# Patient Record
Sex: FEMALE | Race: BLACK OR AFRICAN AMERICAN | Employment: OTHER | URBAN - METROPOLITAN AREA
[De-identification: names, ages, dates, MRNs, and addresses within clinical notes are randomized per-mention and may not be internally consistent; named-entity substitution may affect disease eponyms.]

---

## 2021-09-29 ENCOUNTER — APPOINTMENT (OUTPATIENT)
Dept: CT IMAGING | Age: 71
DRG: 069 | End: 2021-09-29
Payer: MEDICARE

## 2021-09-29 ENCOUNTER — HOSPITAL ENCOUNTER (INPATIENT)
Age: 71
LOS: 6 days | Discharge: HOME | DRG: 069 | End: 2021-10-05
Attending: EMERGENCY MEDICINE | Admitting: STUDENT IN AN ORGANIZED HEALTH CARE EDUCATION/TRAINING PROGRAM
Payer: MEDICARE

## 2021-09-29 ENCOUNTER — APPOINTMENT (OUTPATIENT)
Dept: GENERAL RADIOLOGY | Age: 71
DRG: 069 | End: 2021-09-29
Payer: MEDICARE

## 2021-09-29 DIAGNOSIS — E83.42 HYPOMAGNESEMIA: ICD-10-CM

## 2021-09-29 DIAGNOSIS — G45.9 TIA (TRANSIENT ISCHEMIC ATTACK): Primary | ICD-10-CM

## 2021-09-29 DIAGNOSIS — F11.93 HEROIN WITHDRAWAL (HCC): ICD-10-CM

## 2021-09-29 LAB
A/G RATIO: 1.2 (ref 1.1–2.2)
ALBUMIN SERPL-MCNC: 4.5 G/DL (ref 3.4–5)
ALBUMIN SERPL-MCNC: 4.6 G/DL (ref 3.4–5)
ALP BLD-CCNC: 29 U/L (ref 40–129)
ALP BLD-CCNC: 29 U/L (ref 40–129)
ALT SERPL-CCNC: 7 U/L (ref 10–40)
ALT SERPL-CCNC: 7 U/L (ref 10–40)
AMPHETAMINE SCREEN, URINE: NORMAL
ANION GAP SERPL CALCULATED.3IONS-SCNC: 17 MMOL/L (ref 3–16)
AST SERPL-CCNC: 19 U/L (ref 15–37)
AST SERPL-CCNC: 19 U/L (ref 15–37)
BARBITURATE SCREEN URINE: NORMAL
BASOPHILS ABSOLUTE: 0.1 K/UL (ref 0–0.2)
BASOPHILS RELATIVE PERCENT: 0.6 %
BENZODIAZEPINE SCREEN, URINE: NORMAL
BILIRUB SERPL-MCNC: 0.7 MG/DL (ref 0–1)
BILIRUB SERPL-MCNC: 0.7 MG/DL (ref 0–1)
BILIRUBIN DIRECT: <0.2 MG/DL (ref 0–0.3)
BILIRUBIN, INDIRECT: ABNORMAL MG/DL (ref 0–1)
BUN BLDV-MCNC: 12 MG/DL (ref 7–20)
CALCIUM SERPL-MCNC: 10.4 MG/DL (ref 8.3–10.6)
CANNABINOID SCREEN URINE: NORMAL
CHLORIDE BLD-SCNC: 96 MMOL/L (ref 99–110)
CO2: 23 MMOL/L (ref 21–32)
COCAINE METABOLITE SCREEN URINE: NORMAL
CREAT SERPL-MCNC: 0.8 MG/DL (ref 0.6–1.2)
EKG ATRIAL RATE: 69 BPM
EKG DIAGNOSIS: NORMAL
EKG P AXIS: 65 DEGREES
EKG P-R INTERVAL: 126 MS
EKG Q-T INTERVAL: 410 MS
EKG QRS DURATION: 76 MS
EKG QTC CALCULATION (BAZETT): 439 MS
EKG R AXIS: 70 DEGREES
EKG T AXIS: 23 DEGREES
EKG VENTRICULAR RATE: 69 BPM
EOSINOPHILS ABSOLUTE: 0 K/UL (ref 0–0.6)
EOSINOPHILS RELATIVE PERCENT: 0.2 %
ETHANOL: NORMAL MG/DL (ref 0–0.08)
GFR AFRICAN AMERICAN: >60
GFR NON-AFRICAN AMERICAN: >60
GLOBULIN: 4 G/DL
GLUCOSE BLD-MCNC: 107 MG/DL (ref 70–99)
GLUCOSE BLD-MCNC: 118 MG/DL
GLUCOSE BLD-MCNC: 118 MG/DL
GLUCOSE BLD-MCNC: 118 MG/DL (ref 70–99)
HCT VFR BLD CALC: 40.1 % (ref 36–48)
HEMOGLOBIN: 13.3 G/DL (ref 12–16)
INR BLD: 1.05 (ref 0.88–1.12)
LIPASE: 30 U/L (ref 13–60)
LYMPHOCYTES ABSOLUTE: 2.6 K/UL (ref 1–5.1)
LYMPHOCYTES RELATIVE PERCENT: 30.2 %
Lab: NORMAL
MAGNESIUM: 1.7 MG/DL (ref 1.8–2.4)
MCH RBC QN AUTO: 32 PG (ref 26–34)
MCHC RBC AUTO-ENTMCNC: 33.3 G/DL (ref 31–36)
MCV RBC AUTO: 95.9 FL (ref 80–100)
METHADONE SCREEN, URINE: NORMAL
MONOCYTES ABSOLUTE: 0.5 K/UL (ref 0–1.3)
MONOCYTES RELATIVE PERCENT: 5.9 %
NEUTROPHILS ABSOLUTE: 5.5 K/UL (ref 1.7–7.7)
NEUTROPHILS RELATIVE PERCENT: 63.1 %
OCCULT BLOOD SCREENING: NORMAL
OPIATE SCREEN URINE: NORMAL
OXYCODONE URINE: NORMAL
PDW BLD-RTO: 13.8 % (ref 12.4–15.4)
PERFORMED ON: ABNORMAL
PH UA: 6.5
PHENCYCLIDINE SCREEN URINE: NORMAL
PLATELET # BLD: 355 K/UL (ref 135–450)
PMV BLD AUTO: 7.5 FL (ref 5–10.5)
POTASSIUM REFLEX MAGNESIUM: 3.5 MMOL/L (ref 3.5–5.1)
PROPOXYPHENE SCREEN: NORMAL
PROTHROMBIN TIME: 12 SEC (ref 9.9–12.7)
RBC # BLD: 4.18 M/UL (ref 4–5.2)
SODIUM BLD-SCNC: 136 MMOL/L (ref 136–145)
TOTAL PROTEIN: 8.6 G/DL (ref 6.4–8.2)
TOTAL PROTEIN: 8.6 G/DL (ref 6.4–8.2)
TROPONIN: <0.01 NG/ML
WBC # BLD: 8.7 K/UL (ref 4–11)

## 2021-09-29 PROCEDURE — 6370000000 HC RX 637 (ALT 250 FOR IP): Performed by: STUDENT IN AN ORGANIZED HEALTH CARE EDUCATION/TRAINING PROGRAM

## 2021-09-29 PROCEDURE — 82077 ASSAY SPEC XCP UR&BREATH IA: CPT

## 2021-09-29 PROCEDURE — 85610 PROTHROMBIN TIME: CPT

## 2021-09-29 PROCEDURE — 85025 COMPLETE CBC W/AUTO DIFF WBC: CPT

## 2021-09-29 PROCEDURE — 6360000002 HC RX W HCPCS: Performed by: EMERGENCY MEDICINE

## 2021-09-29 PROCEDURE — 2500000003 HC RX 250 WO HCPCS: Performed by: STUDENT IN AN ORGANIZED HEALTH CARE EDUCATION/TRAINING PROGRAM

## 2021-09-29 PROCEDURE — 83690 ASSAY OF LIPASE: CPT

## 2021-09-29 PROCEDURE — 6370000000 HC RX 637 (ALT 250 FOR IP): Performed by: EMERGENCY MEDICINE

## 2021-09-29 PROCEDURE — 36415 COLL VENOUS BLD VENIPUNCTURE: CPT

## 2021-09-29 PROCEDURE — 80307 DRUG TEST PRSMV CHEM ANLYZR: CPT

## 2021-09-29 PROCEDURE — 2060000000 HC ICU INTERMEDIATE R&B

## 2021-09-29 PROCEDURE — 82270 OCCULT BLOOD FECES: CPT

## 2021-09-29 PROCEDURE — 70450 CT HEAD/BRAIN W/O DYE: CPT

## 2021-09-29 PROCEDURE — 71045 X-RAY EXAM CHEST 1 VIEW: CPT

## 2021-09-29 PROCEDURE — 2580000003 HC RX 258: Performed by: EMERGENCY MEDICINE

## 2021-09-29 PROCEDURE — 6360000002 HC RX W HCPCS: Performed by: STUDENT IN AN ORGANIZED HEALTH CARE EDUCATION/TRAINING PROGRAM

## 2021-09-29 PROCEDURE — 84484 ASSAY OF TROPONIN QUANT: CPT

## 2021-09-29 PROCEDURE — 2580000003 HC RX 258: Performed by: STUDENT IN AN ORGANIZED HEALTH CARE EDUCATION/TRAINING PROGRAM

## 2021-09-29 PROCEDURE — 93005 ELECTROCARDIOGRAM TRACING: CPT | Performed by: EMERGENCY MEDICINE

## 2021-09-29 PROCEDURE — 99284 EMERGENCY DEPT VISIT MOD MDM: CPT

## 2021-09-29 PROCEDURE — 93010 ELECTROCARDIOGRAM REPORT: CPT | Performed by: INTERNAL MEDICINE

## 2021-09-29 PROCEDURE — 80053 COMPREHEN METABOLIC PANEL: CPT

## 2021-09-29 PROCEDURE — 83735 ASSAY OF MAGNESIUM: CPT

## 2021-09-29 RX ORDER — LABETALOL HYDROCHLORIDE 5 MG/ML
10 INJECTION, SOLUTION INTRAVENOUS EVERY 10 MIN PRN
Status: DISCONTINUED | OUTPATIENT
Start: 2021-09-29 | End: 2021-09-30

## 2021-09-29 RX ORDER — ONDANSETRON 4 MG/1
4 TABLET, ORALLY DISINTEGRATING ORAL EVERY 8 HOURS PRN
COMMUNITY

## 2021-09-29 RX ORDER — ACETAMINOPHEN 325 MG/1
650 TABLET ORAL ONCE
Status: COMPLETED | OUTPATIENT
Start: 2021-09-29 | End: 2021-09-29

## 2021-09-29 RX ORDER — 0.9 % SODIUM CHLORIDE 0.9 %
500 INTRAVENOUS SOLUTION INTRAVENOUS ONCE
Status: COMPLETED | OUTPATIENT
Start: 2021-09-29 | End: 2021-09-29

## 2021-09-29 RX ORDER — ATORVASTATIN CALCIUM 40 MG/1
40 TABLET, FILM COATED ORAL NIGHTLY
Status: DISCONTINUED | OUTPATIENT
Start: 2021-09-29 | End: 2021-10-05 | Stop reason: HOSPADM

## 2021-09-29 RX ORDER — GABAPENTIN 300 MG/1
300 CAPSULE ORAL 3 TIMES DAILY
Status: ON HOLD | COMMUNITY
End: 2021-10-03 | Stop reason: HOSPADM

## 2021-09-29 RX ORDER — SODIUM CHLORIDE 9 MG/ML
25 INJECTION, SOLUTION INTRAVENOUS PRN
Status: DISCONTINUED | OUTPATIENT
Start: 2021-09-29 | End: 2021-10-05 | Stop reason: HOSPADM

## 2021-09-29 RX ORDER — SODIUM CHLORIDE 0.9 % (FLUSH) 0.9 %
5-40 SYRINGE (ML) INJECTION EVERY 12 HOURS SCHEDULED
Status: DISCONTINUED | OUTPATIENT
Start: 2021-09-29 | End: 2021-10-05 | Stop reason: HOSPADM

## 2021-09-29 RX ORDER — PANTOPRAZOLE SODIUM 40 MG/1
40 TABLET, DELAYED RELEASE ORAL DAILY
COMMUNITY

## 2021-09-29 RX ORDER — VENLAFAXINE HYDROCHLORIDE 75 MG/1
75 CAPSULE, EXTENDED RELEASE ORAL DAILY
COMMUNITY

## 2021-09-29 RX ORDER — BUPRENORPHINE 2 MG/1
4 TABLET SUBLINGUAL ONCE
Status: COMPLETED | OUTPATIENT
Start: 2021-09-29 | End: 2021-09-29

## 2021-09-29 RX ORDER — CLOPIDOGREL BISULFATE 75 MG/1
75 TABLET ORAL DAILY
Status: DISCONTINUED | OUTPATIENT
Start: 2021-09-30 | End: 2021-10-05 | Stop reason: HOSPADM

## 2021-09-29 RX ORDER — AMLODIPINE BESYLATE 5 MG/1
5 TABLET ORAL DAILY
Status: ON HOLD | COMMUNITY
End: 2021-10-05 | Stop reason: HOSPADM

## 2021-09-29 RX ORDER — ROSUVASTATIN CALCIUM 5 MG/1
5 TABLET, COATED ORAL DAILY
Status: ON HOLD | COMMUNITY
End: 2021-10-03 | Stop reason: HOSPADM

## 2021-09-29 RX ORDER — ASPIRIN 81 MG/1
81 TABLET, CHEWABLE ORAL DAILY
Status: ON HOLD | COMMUNITY
End: 2021-10-05 | Stop reason: HOSPADM

## 2021-09-29 RX ORDER — MAGNESIUM SULFATE IN WATER 40 MG/ML
2000 INJECTION, SOLUTION INTRAVENOUS ONCE
Status: COMPLETED | OUTPATIENT
Start: 2021-09-29 | End: 2021-09-29

## 2021-09-29 RX ORDER — ASPIRIN 81 MG/1
81 TABLET ORAL DAILY
Status: DISCONTINUED | OUTPATIENT
Start: 2021-09-30 | End: 2021-09-30

## 2021-09-29 RX ORDER — BUPRENORPHINE AND NALOXONE 8; 2 MG/1; MG/1
0.5 FILM, SOLUBLE BUCCAL; SUBLINGUAL DAILY
Status: DISCONTINUED | OUTPATIENT
Start: 2021-09-29 | End: 2021-09-29 | Stop reason: RX

## 2021-09-29 RX ORDER — LISINOPRIL 40 MG/1
40 TABLET ORAL DAILY
Status: ON HOLD | COMMUNITY
End: 2021-10-03 | Stop reason: HOSPADM

## 2021-09-29 RX ORDER — ALPRAZOLAM 0.25 MG/1
0.25 TABLET ORAL 2 TIMES DAILY
COMMUNITY

## 2021-09-29 RX ORDER — ONDANSETRON 2 MG/ML
4 INJECTION INTRAMUSCULAR; INTRAVENOUS ONCE
Status: COMPLETED | OUTPATIENT
Start: 2021-09-29 | End: 2021-09-29

## 2021-09-29 RX ORDER — ONDANSETRON 2 MG/ML
4 INJECTION INTRAMUSCULAR; INTRAVENOUS EVERY 6 HOURS PRN
Status: DISCONTINUED | OUTPATIENT
Start: 2021-09-29 | End: 2021-10-05 | Stop reason: HOSPADM

## 2021-09-29 RX ORDER — SODIUM CHLORIDE 0.9 % (FLUSH) 0.9 %
5-40 SYRINGE (ML) INJECTION PRN
Status: DISCONTINUED | OUTPATIENT
Start: 2021-09-29 | End: 2021-10-05 | Stop reason: HOSPADM

## 2021-09-29 RX ORDER — ONDANSETRON 4 MG/1
4 TABLET, ORALLY DISINTEGRATING ORAL EVERY 8 HOURS PRN
Status: DISCONTINUED | OUTPATIENT
Start: 2021-09-29 | End: 2021-10-05 | Stop reason: HOSPADM

## 2021-09-29 RX ORDER — POLYETHYLENE GLYCOL 3350 17 G/17G
17 POWDER, FOR SOLUTION ORAL DAILY PRN
Status: DISCONTINUED | OUTPATIENT
Start: 2021-09-29 | End: 2021-10-05 | Stop reason: HOSPADM

## 2021-09-29 RX ADMIN — ATORVASTATIN CALCIUM 40 MG: 40 TABLET, FILM COATED ORAL at 21:39

## 2021-09-29 RX ADMIN — SODIUM CHLORIDE 500 ML: 9 INJECTION, SOLUTION INTRAVENOUS at 15:29

## 2021-09-29 RX ADMIN — LABETALOL HYDROCHLORIDE 10 MG: 5 INJECTION INTRAVENOUS at 21:39

## 2021-09-29 RX ADMIN — BUPRENORPHINE 4 MG: 2 TABLET SUBLINGUAL at 14:36

## 2021-09-29 RX ADMIN — ONDANSETRON 4 MG: 2 INJECTION INTRAMUSCULAR; INTRAVENOUS at 21:42

## 2021-09-29 RX ADMIN — Medication 10 ML: at 21:39

## 2021-09-29 RX ADMIN — ONDANSETRON 4 MG: 2 INJECTION INTRAMUSCULAR; INTRAVENOUS at 15:45

## 2021-09-29 RX ADMIN — ACETAMINOPHEN 650 MG: 325 TABLET ORAL at 15:32

## 2021-09-29 RX ADMIN — MAGNESIUM SULFATE HEPTAHYDRATE 2000 MG: 40 INJECTION, SOLUTION INTRAVENOUS at 15:31

## 2021-09-29 ASSESSMENT — PAIN DESCRIPTION - LOCATION
LOCATION: ABDOMEN
LOCATION: ABDOMEN;HEAD

## 2021-09-29 ASSESSMENT — PAIN DESCRIPTION - ONSET
ONSET: PROGRESSIVE
ONSET: ON-GOING

## 2021-09-29 ASSESSMENT — PAIN DESCRIPTION - PAIN TYPE
TYPE: CHRONIC PAIN
TYPE: ACUTE PAIN

## 2021-09-29 ASSESSMENT — ENCOUNTER SYMPTOMS
BACK PAIN: 0
NAUSEA: 1
SORE THROAT: 0
SHORTNESS OF BREATH: 0
DIARRHEA: 1
ABDOMINAL PAIN: 0
RHINORRHEA: 0
VOMITING: 1
EYE REDNESS: 0

## 2021-09-29 ASSESSMENT — PAIN SCALES - GENERAL
PAINLEVEL_OUTOF10: 10
PAINLEVEL_OUTOF10: 6

## 2021-09-29 ASSESSMENT — PAIN DESCRIPTION - DESCRIPTORS
DESCRIPTORS: SHARP
DESCRIPTORS: ACHING

## 2021-09-29 ASSESSMENT — PAIN DESCRIPTION - PROGRESSION
CLINICAL_PROGRESSION: GRADUALLY WORSENING

## 2021-09-29 ASSESSMENT — PAIN DESCRIPTION - FREQUENCY: FREQUENCY: CONTINUOUS

## 2021-09-29 ASSESSMENT — PAIN DESCRIPTION - ORIENTATION: ORIENTATION: MID

## 2021-09-29 NOTE — ED NOTES
Pt voided on bedpan  Up dated on rm assignment.    Headache remains [address] [facility], [facility]  09/29/21 1737

## 2021-09-29 NOTE — ED NOTES
Report to [facility] @ [address] [place] [address], [address] [place] [address]  09/29/21 2041

## 2021-09-29 NOTE — ED NOTES
EMD at bedside talking with family about test results    Pt shana out with leg cramp to rt lower leg at intervals   Unable to keep any medication down past 2 days     Blade Baer RN  09/29/21 1777

## 2021-09-29 NOTE — ED NOTES
Pt voided in depends  Cleaned up  Pt able to roll easily from side to side     Joselito Ochoa RN  09/29/21 7818

## 2021-09-29 NOTE — ED PROVIDER NOTES
[address] [facility]  eMERGENCY dEPARTMENT eNCOUnter      Pt Name: Dietrich Lefort  MRN: [id number]  Gloriagflaith 1950  Date of evaluation: 9/29/2021  Provider: Cedric Lyle MD    CHIEF COMPLAINT       Chief Complaint   Patient presents with    Other     pt was brought to ER by sister  was taken to [facility] this am 10:30 am for abd pain  abd headache  left ER due to long wait  states was also at  2 days for HIGH B/P         HISTORY OF PRESENT ILLNESS   (Location/Symptom, Timing/Onset,Context/Setting, Quality, Duration, Modifying Factors, Severity)  Note limiting factors. Dietrich Lefort is a 69 y.o. female who presents to the emergency department with concern for left facial droop and heroin withdrawal.  This patient has a past medical history of heroin abuse. She last used on Friday. This weekend she was seen at an outside hospital and started on clonidine and some other medications for heroin withdrawal.  History was obtained mostly from the sister of the patient. The patient's sister states that the patient has been abusing heroin and she lives in [place]. She recently brought her down to [place] in an effort to be of help to get her off heroin. She last used on Friday. Saturday started having symptoms of withdrawal she was seen in outside hospital and started on antiemetic and clonidine. This morning she last was normal at 10:30 AM.  When she came to the ED today she noticed that she had a little left facial droop. She was put into the room and will be checked by pulling out her mask we did not see a facial droop. Patient does complain of a headache since last evening. It was gradual in onset and worsened throughout the night and this morning. Patient has been having associated nausea vomiting and diarrhea from the heroin withdrawal.  She also complains of chronic right upper quadrant pain for the past year. HPI    NursingNotes were reviewed.     REVIEW OF SYSTEMS    (2-9 systems for level 4, 10 or more for level 5)     Review of Systems   Constitutional: Negative for chills and fever. HENT: Negative for rhinorrhea and sore throat. Eyes: Negative for redness. Respiratory: Negative for shortness of breath. Cardiovascular: Negative for chest pain. Gastrointestinal: Positive for diarrhea, nausea and vomiting. Negative for abdominal pain. Genitourinary: Negative for flank pain. Musculoskeletal: Negative for back pain. Skin: Negative for rash. Neurological: Positive for facial asymmetry and headaches. Hematological: Negative for adenopathy. Psychiatric/Behavioral: Negative for confusion. Except as noted above the remainder of the review of systems was reviewed and negative. PAST MEDICAL HISTORY     Past Medical History:   Diagnosis Date    Asthma     [facility]bral artery occlusion [facility] ([facility]     COPD (chronic obstructive pulmonary disease) ([facility]     Hypertension     Seiz[facility]          SURGICALHISTORY     History reviewed. No pertinent surgical history. CURRENT MEDICATIONS       Previous Medications    GABAPENTIN (NEURONTIN) 300 MG CAPSULE    Take 300 mg by mouth 3 times daily. PANTOPRAZOLE (PROTONIX) 40 MG TABLET    Take 40 mg by mouth daily       ALLERGIES     Iodine, Nsaids, and Sulfa antibiotics    FAMILY HISTORY     History reviewed. No pertinent family history.        SOCIAL HISTORY       Social History     Socioeconomic History    Marital status: Single     Spouse name: None    Number of children: None    Years of education: None    Highest education level: None   Occupational History    None   Tobacco Use    Smoking status: Current Every Day Smoker     Packs/day: 0.50    Smokeless tobacco: Current User   Substance and Sexual Activity    Alcohol use: Not Currently    Drug use: Yes     Types: Marijuana     Comment: heroin  snorts daily last used 5 days ago    Sexual activity: Not Currently Other Topics Concern    None   Social History Narrative    None     Social Determinants of Health     Financial Resource Strain:     Difficulty of Paying Living Expenses:    Food Insecurity:     Worried About Running Out of Food in the Last Year:     [address] in the Last Year:    Transportation Needs:     Lack of Transportation (Medical):  Lack of Transportation (Non-Medical):    Physical Activity:     Days of Exercise per Week:     Minutes of Exercise per Session:    Stress:     Feeling of Stress :    Social Connections:     Frequency of Communication with Friends and Family:     Frequency of Social Gatherings with Friends and Family:     Attends Spiritism Services:     Active Member of Clubs or Organizations:     Attends Club or Organization Meetings:     Marital Status:    Intimate Partner Violence:     Fear of Current or Ex-Partner:     Emotionally Abused:     Physically Abused:     Sexually Abused:        SCREENINGS   NIH Stroke Scale  Interval: Baseline  Level of Consciousness (1a. ): Alert  LOC Questions (1b. ): Answers both correctly  LOC Commands (1c. ): Performs both tasks correctly  Best Gaze (2. ): Normal  Visual (3. ): No visual loss  Facial Palsy (4. ): Normal symmetrical movement  Motor Arm, Left (5a. ): No drift  Motor Arm, Right (5b. ): No drift  Motor Leg, Left (6a. ):  (pt wont lift legs due to leg pain tanya  did walk from  car to w/c)  Limb Ataxia (7. ): Absent  Sensory (8. ): Normal  Best Language (9. ): No aphasia  Dysarthria (10. ): Normal         PHYSICAL EXAM    (up to 7 for level 4, 8 or more for level 5)     ED Triage Vitals   BP Temp Temp src Pulse Resp SpO2 Height Weight   -- -- -- -- -- -- -- --       Physical Exam  Vitals and nursing note reviewed. Constitutional:       Appearance: She is well-developed. She is not diaphoretic. HENT:      Head: Normocephalic and atraumatic.       Comments: No facial droop  Eyes:      General:         Right eye: No discharge. Left eye: No discharge. Conjunctiva/sclera: Conjunctivae normal.   Cardiovascular:      Rate and Rhythm: Normal rate. Pulses: Normal pulses. Pulmonary:      Effort: Pulmonary effort is normal. No respiratory distress. Breath sounds: Normal breath sounds. No wheezing, rhonchi or rales. Abdominal:      Palpations: Abdomen is soft. Tenderness: There is no abdominal tenderness. There is no guarding or rebound. Musculoskeletal:         General: Normal range of motion. Skin:     General: Skin is warm and dry. Capillary Refill: Capillary refill takes less than 2 seconds. Neurological:      Mental Status: She is alert and oriented to person, place, and time. Comments: NIHSS 0   Psychiatric:         Behavior: Behavior normal.         DIAGNOSTIC RESULTS     EKG: All EKG's are interpreted by the Emergency Department Physician who either signs or Co-signsthis chart in the absence of a cardiologist.  The Ekg interpreted by me shows  normal sinus rhythm with a rate of 69  Axis is   Normal  QTc is  normal  Intervals and Durations are unremarkable. ST Segments: nonspecific changes  No prior EKG available for comparison at this time            RADIOLOGY:   Non-plain filmimages such as CT, Ultrasound and MRI are read by the radiologist. Plain radiographic images are visualized and preliminarily interpreted by the emergency physician with the below findings:        Interpretation per the Radiologist below, if available at the time ofthis note:    XR CHEST 1 VIEW   Final Result   No acute disease         CT HEAD WO CONTRAST   Final Result   No acute intracranial abnormality. Chronic small-vessel white matter ischemic changes. Left basal ganglia   lacunar infarct. Findings were discussed with Porsha Gibson at 12:21 pm on   9/29/2021.                ED BEDSIDE ULTRASOUND:   Performed by ED Physician - none    LABS:  Labs Reviewed   COMPREHENSIVE METABOLIC PANEL W/ REFLEX TO MG FOR LOW K - Abnormal; Notable for the following components:       Result Value    Chloride 96 (*)     Anion Gap 17 (*)     Glucose 107 (*)     Total Protein 8.6 (*)     Alkaline Phosphatase 29 (*)     ALT 7 (*)     All other components within normal limits    Narrative:     Performed at:  [facility]  [address] [place], [place]   Phone [phone]   HEPATIC FUNCTION PANEL - Abnormal; Notable for the following components:     Total Protein 8.6 (*)     Alkaline Phosphatase 29 (*)     ALT 7 (*)     All other components within normal limits    Narrative:     Performed at:  [facility]  [address] [place], [place]   Phone [phone]   MAGNESIUM - Abnormal; Notable for the following components:    Magnesium 1.70 (*)     All other components within normal limits    Narrative:     Performed at:  [facility]  [address] [place], [place]   Phone [phone]   POCT GLUCOSE - Abnormal; Notable for the following components:    POC Glucose 118 (*)     All other components within normal limits    Narrative:     Performed at:  [facility]  [address] [place], [place]   Phone [phone]   POCT GLUCOSE - Normal   POCT GLUCOSE - Normal   C DIFF TOXIN/ANTIGEN   CBC WITH AUTO DIFFERENTIAL    Narrative:     Performed at:  [facility]  [address] [place], [place]   Phone [phone]   TROPONIN    Narrative:     Performed at:  [address] [facility]  [address] [place], [place]   Phone [phone]   PROTIME-INR    Narrative:     Performed at:  [address] [facility]  [address] [place], [place]   Phone [phone] ETHANOL    Narrative:     Performed at:  [facility]  [address] [place], [facility]   Phone [phone]   LIPASE    Narrative:     Performed at:  [address] [facility]  [address] [place], [facility]   Phone [phone]   URINE DRUG SCREEN   BLOOD OCCULT STOOL SCREEN #1       All other labs were within normal range or not returned as of this dictation. EMERGENCY DEPARTMENT COURSE and DIFFERENTIAL DIAGNOSIS/MDM:   Vitals:    Vitals:    09/29/21 1207 09/29/21 1259 09/29/21 1302   BP: (!) 256/85 (!) 228/78 (S) (!) 243/78   Pulse: 66     Resp: 18     Temp: 98 °F (36.7 °C)     TempSrc: Oral     SpO2: 100%     Height: 5' 2\" (1.575 m)             MDM  Number of Diagnoses or Management Options  Heroin withdrawal (HCC)  TIA (transient ischemic attack)  Diagnosis management comments: DDX: TIA, CVA, Mass, other    Verbal history the patient has had a prior TIA but they also report that she has some residual weakness suggestive of a prior CVA. Patient has an allergy to contrast which she states is life-threatening. Currently she has no facial droop and her NIH SS delta is essentially 0. She has no new neurologic symptoms currently but she did have facial droop just prior to arrival.  As such a CT of the head was undertaken and did not reveal any acute pathology. Given her life-threatening allergy to contrast a CT angiogram was not undertaken. I spoke to the stroke service and they agree with that plan. They do not feel any acute intervention is indicated at this time. They do recommend consider dual antiplatelet therapy if the patient did have a prior CVA. I feel the patient's low magnesium is most likely reflective of her diarrhea and vomiting by history. Her cow score is greater than 8. She will be started on Subutex in the ED.   I believe the symptoms are secondary to her heroin withdrawal.  I have spoken to the hospitalist Dr. Ryan Hardin who accepts to [facility] at 2:20 PM.      CRITICAL CARE TIME   Total Critical Care time was 30 minutes, excluding separately reportable procedures. There was a high probability of clinically significant/life threatening deterioration in the patient's condition which required my urgent intervention. CONSULTS:  IP CONSULT TO STROKE TEAM  IP CONSULT TO PHARMACY  PHARMACY TO CHANGE BASE FLUIDS    PROCEDURES:  Unless otherwise noted below, none     Procedures    FINAL IMPRESSION      1. TIA (transient ischemic attack)    2. Heroin withdrawal ([facility]    3. Hypomagnesemia          DISPOSITION/PLAN   DISPOSITION Admitted 09/29/2021 02:31:30 PM      PATIENT REFERRED TO:  No follow-up provider specified.     DISCHARGE MEDICATIONS:  New Prescriptions    No medications on file          (Please note that portions of this note were completed with a voice recognition program.Efforts were made to edit the dictations but occasionally words are mis-transcribed.)    Lucille Dolan MD (electronically signed)  Attending Emergency Physician          Lucille Dolan MD  09/29/21 0[address] [facility] [address], [place]  09/29/21 1015

## 2021-09-29 NOTE — ED NOTES
Pt states has had abd pain for awhile  More severe past few days vomiting yesterday and today loose stools yellowish in color  States some days stool has been black     Chanell Mallory, WINNIE  09/29/21 8580

## 2021-09-29 NOTE — ED NOTES
Fall risk screening completed. Fall risk bracelet applied to patient. Non-skid socks provided and placed on patient. The fall risk is indicated using  fall sign . Based on score, a bed alarm was not indicated. The call light is within the patient's reach, and instructions for use were provided. The bed is in the lowest position with wheels locked. The patient has been advised to notify staff, using the call light, if there is a need to get up or use restroom. The patient verbalized understanding of safety precautions and how to contact staff for assistance.       Aleena Story RN  09/29/21 1977

## 2021-09-29 NOTE — ED NOTES
Family states while in car getting in into ER had rt sided mouth droop   Resolved once on bed in ER  States has hx of stroke with lf sided weakness     Moiz Armstrong, RN  09/29/21 1400

## 2021-09-30 ENCOUNTER — APPOINTMENT (OUTPATIENT)
Dept: MRI IMAGING | Age: 71
DRG: 069 | End: 2021-09-30
Payer: MEDICARE

## 2021-09-30 PROBLEM — E43 SEVERE MALNUTRITION (HCC): Chronic | Status: ACTIVE | Noted: 2021-09-30

## 2021-09-30 LAB
ANION GAP SERPL CALCULATED.3IONS-SCNC: 14 MMOL/L (ref 3–16)
BASOPHILS ABSOLUTE: 0.1 K/UL (ref 0–0.2)
BASOPHILS RELATIVE PERCENT: 0.7 %
BUN BLDV-MCNC: 11 MG/DL (ref 7–20)
CALCIUM SERPL-MCNC: 9.4 MG/DL (ref 8.3–10.6)
CHLORIDE BLD-SCNC: 96 MMOL/L (ref 99–110)
CHOLESTEROL, TOTAL: 185 MG/DL (ref 0–199)
CO2: 23 MMOL/L (ref 21–32)
CREAT SERPL-MCNC: 0.8 MG/DL (ref 0.6–1.2)
EOSINOPHILS ABSOLUTE: 0.1 K/UL (ref 0–0.6)
EOSINOPHILS RELATIVE PERCENT: 0.9 %
ESTIMATED AVERAGE GLUCOSE: 108.3 MG/DL
GFR AFRICAN AMERICAN: >60
GFR NON-AFRICAN AMERICAN: >60
GLUCOSE BLD-MCNC: 115 MG/DL (ref 70–99)
HBA1C MFR BLD: 5.4 %
HCT VFR BLD CALC: 36.9 % (ref 36–48)
HDLC SERPL-MCNC: 55 MG/DL (ref 40–60)
HEMOGLOBIN: 12.5 G/DL (ref 12–16)
LDL CHOLESTEROL CALCULATED: 111 MG/DL
LV EF: 70 %
LVEF MODALITY: NORMAL
LYMPHOCYTES ABSOLUTE: 2.4 K/UL (ref 1–5.1)
LYMPHOCYTES RELATIVE PERCENT: 33 %
MCH RBC QN AUTO: 32.1 PG (ref 26–34)
MCHC RBC AUTO-ENTMCNC: 33.8 G/DL (ref 31–36)
MCV RBC AUTO: 94.7 FL (ref 80–100)
MONOCYTES ABSOLUTE: 0.5 K/UL (ref 0–1.3)
MONOCYTES RELATIVE PERCENT: 6.1 %
NEUTROPHILS ABSOLUTE: 4.4 K/UL (ref 1.7–7.7)
NEUTROPHILS RELATIVE PERCENT: 59.3 %
PDW BLD-RTO: 13.6 % (ref 12.4–15.4)
PLATELET # BLD: 318 K/UL (ref 135–450)
PMV BLD AUTO: 7.7 FL (ref 5–10.5)
POTASSIUM SERPL-SCNC: 3.5 MMOL/L (ref 3.5–5.1)
RBC # BLD: 3.9 M/UL (ref 4–5.2)
SODIUM BLD-SCNC: 133 MMOL/L (ref 136–145)
TRIGL SERPL-MCNC: 94 MG/DL (ref 0–150)
VLDLC SERPL CALC-MCNC: 19 MG/DL
WBC # BLD: 7.4 K/UL (ref 4–11)

## 2021-09-30 PROCEDURE — 93306 TTE W/DOPPLER COMPLETE: CPT

## 2021-09-30 PROCEDURE — 6370000000 HC RX 637 (ALT 250 FOR IP): Performed by: INTERNAL MEDICINE

## 2021-09-30 PROCEDURE — 2500000003 HC RX 250 WO HCPCS: Performed by: INTERNAL MEDICINE

## 2021-09-30 PROCEDURE — 6370000000 HC RX 637 (ALT 250 FOR IP): Performed by: STUDENT IN AN ORGANIZED HEALTH CARE EDUCATION/TRAINING PROGRAM

## 2021-09-30 PROCEDURE — 6360000002 HC RX W HCPCS: Performed by: INTERNAL MEDICINE

## 2021-09-30 PROCEDURE — 97165 OT EVAL LOW COMPLEX 30 MIN: CPT

## 2021-09-30 PROCEDURE — 97162 PT EVAL MOD COMPLEX 30 MIN: CPT | Performed by: PHYSICAL THERAPIST

## 2021-09-30 PROCEDURE — 92610 EVALUATE SWALLOWING FUNCTION: CPT

## 2021-09-30 PROCEDURE — 70544 MR ANGIOGRAPHY HEAD W/O DYE: CPT

## 2021-09-30 PROCEDURE — 2580000003 HC RX 258: Performed by: STUDENT IN AN ORGANIZED HEALTH CARE EDUCATION/TRAINING PROGRAM

## 2021-09-30 PROCEDURE — 6370000000 HC RX 637 (ALT 250 FOR IP): Performed by: NURSE PRACTITIONER

## 2021-09-30 PROCEDURE — 2500000003 HC RX 250 WO HCPCS: Performed by: STUDENT IN AN ORGANIZED HEALTH CARE EDUCATION/TRAINING PROGRAM

## 2021-09-30 PROCEDURE — 80061 LIPID PANEL: CPT

## 2021-09-30 PROCEDURE — 94760 N-INVAS EAR/PLS OXIMETRY 1: CPT

## 2021-09-30 PROCEDURE — 97116 GAIT TRAINING THERAPY: CPT | Performed by: PHYSICAL THERAPIST

## 2021-09-30 PROCEDURE — 36415 COLL VENOUS BLD VENIPUNCTURE: CPT

## 2021-09-30 PROCEDURE — 6360000002 HC RX W HCPCS: Performed by: STUDENT IN AN ORGANIZED HEALTH CARE EDUCATION/TRAINING PROGRAM

## 2021-09-30 PROCEDURE — 85025 COMPLETE CBC W/AUTO DIFF WBC: CPT

## 2021-09-30 PROCEDURE — 80048 BASIC METABOLIC PNL TOTAL CA: CPT

## 2021-09-30 PROCEDURE — 2060000000 HC ICU INTERMEDIATE R&B

## 2021-09-30 PROCEDURE — 83036 HEMOGLOBIN GLYCOSYLATED A1C: CPT

## 2021-09-30 PROCEDURE — 97530 THERAPEUTIC ACTIVITIES: CPT

## 2021-09-30 RX ORDER — ALBUTEROL SULFATE 90 UG/1
2 AEROSOL, METERED RESPIRATORY (INHALATION) EVERY 6 HOURS PRN
COMMUNITY

## 2021-09-30 RX ORDER — PANTOPRAZOLE SODIUM 40 MG/1
40 TABLET, DELAYED RELEASE ORAL
Status: DISCONTINUED | OUTPATIENT
Start: 2021-09-30 | End: 2021-10-05 | Stop reason: HOSPADM

## 2021-09-30 RX ORDER — AMLODIPINE BESYLATE 5 MG/1
5 TABLET ORAL NIGHTLY
Status: DISCONTINUED | OUTPATIENT
Start: 2021-09-30 | End: 2021-10-05

## 2021-09-30 RX ORDER — VENLAFAXINE HYDROCHLORIDE 75 MG/1
75 CAPSULE, EXTENDED RELEASE ORAL DAILY
Status: DISCONTINUED | OUTPATIENT
Start: 2021-09-30 | End: 2021-10-05 | Stop reason: HOSPADM

## 2021-09-30 RX ORDER — AMLODIPINE BESYLATE 10 MG/1
10 TABLET ORAL DAILY
Status: DISCONTINUED | OUTPATIENT
Start: 2021-09-30 | End: 2021-09-30

## 2021-09-30 RX ORDER — LISINOPRIL 40 MG/1
40 TABLET ORAL DAILY
Status: DISCONTINUED | OUTPATIENT
Start: 2021-09-30 | End: 2021-10-05 | Stop reason: HOSPADM

## 2021-09-30 RX ORDER — GABAPENTIN 300 MG/1
300 CAPSULE ORAL 3 TIMES DAILY
Status: DISCONTINUED | OUTPATIENT
Start: 2021-09-30 | End: 2021-10-05 | Stop reason: HOSPADM

## 2021-09-30 RX ORDER — NICOTINE 21 MG/24HR
1 PATCH, TRANSDERMAL 24 HOURS TRANSDERMAL DAILY
Status: DISCONTINUED | OUTPATIENT
Start: 2021-09-30 | End: 2021-10-05 | Stop reason: HOSPADM

## 2021-09-30 RX ORDER — AMLODIPINE BESYLATE 5 MG/1
5 TABLET ORAL DAILY
Status: DISCONTINUED | OUTPATIENT
Start: 2021-09-30 | End: 2021-09-30

## 2021-09-30 RX ORDER — ALPRAZOLAM 0.25 MG/1
0.25 TABLET ORAL 2 TIMES DAILY
Status: DISCONTINUED | OUTPATIENT
Start: 2021-09-30 | End: 2021-10-05 | Stop reason: HOSPADM

## 2021-09-30 RX ORDER — HYDRALAZINE HYDROCHLORIDE 20 MG/ML
10 INJECTION INTRAMUSCULAR; INTRAVENOUS EVERY 6 HOURS PRN
Status: DISCONTINUED | OUTPATIENT
Start: 2021-09-30 | End: 2021-10-05 | Stop reason: HOSPADM

## 2021-09-30 RX ORDER — LABETALOL HYDROCHLORIDE 5 MG/ML
10 INJECTION, SOLUTION INTRAVENOUS EVERY 4 HOURS PRN
Status: DISCONTINUED | OUTPATIENT
Start: 2021-09-30 | End: 2021-10-01

## 2021-09-30 RX ADMIN — GABAPENTIN 300 MG: 300 CAPSULE ORAL at 20:50

## 2021-09-30 RX ADMIN — HYDRALAZINE HYDROCHLORIDE 10 MG: 20 INJECTION INTRAMUSCULAR; INTRAVENOUS at 14:38

## 2021-09-30 RX ADMIN — Medication 10 ML: at 14:38

## 2021-09-30 RX ADMIN — AMLODIPINE BESYLATE 5 MG: 5 TABLET ORAL at 20:50

## 2021-09-30 RX ADMIN — LABETALOL HYDROCHLORIDE 10 MG: 5 INJECTION INTRAVENOUS at 11:38

## 2021-09-30 RX ADMIN — LISINOPRIL 40 MG: 40 TABLET ORAL at 11:55

## 2021-09-30 RX ADMIN — ALPRAZOLAM 0.25 MG: 0.25 TABLET ORAL at 11:55

## 2021-09-30 RX ADMIN — LABETALOL HYDROCHLORIDE 10 MG: 5 INJECTION INTRAVENOUS at 00:23

## 2021-09-30 RX ADMIN — ATORVASTATIN CALCIUM 40 MG: 40 TABLET, FILM COATED ORAL at 20:50

## 2021-09-30 RX ADMIN — ONDANSETRON 4 MG: 2 INJECTION INTRAMUSCULAR; INTRAVENOUS at 09:23

## 2021-09-30 RX ADMIN — LABETALOL HYDROCHLORIDE 10 MG: 5 INJECTION INTRAVENOUS at 17:57

## 2021-09-30 RX ADMIN — LABETALOL HYDROCHLORIDE 10 MG: 5 INJECTION INTRAVENOUS at 03:50

## 2021-09-30 RX ADMIN — ALPRAZOLAM 0.25 MG: 0.25 TABLET ORAL at 20:13

## 2021-09-30 RX ADMIN — PANTOPRAZOLE SODIUM 40 MG: 40 TABLET, DELAYED RELEASE ORAL at 11:55

## 2021-09-30 RX ADMIN — Medication 10 ML: at 09:26

## 2021-09-30 ASSESSMENT — PAIN DESCRIPTION - PROGRESSION
CLINICAL_PROGRESSION: GRADUALLY WORSENING

## 2021-09-30 ASSESSMENT — PAIN SCALES - GENERAL
PAINLEVEL_OUTOF10: 9
PAINLEVEL_OUTOF10: 0

## 2021-09-30 ASSESSMENT — PAIN DESCRIPTION - ORIENTATION: ORIENTATION: MID

## 2021-09-30 ASSESSMENT — PAIN DESCRIPTION - PAIN TYPE: TYPE: CHRONIC PAIN

## 2021-09-30 ASSESSMENT — PAIN DESCRIPTION - FREQUENCY: FREQUENCY: CONTINUOUS

## 2021-09-30 ASSESSMENT — PAIN DESCRIPTION - LOCATION: LOCATION: ABDOMEN;HEAD

## 2021-09-30 ASSESSMENT — PAIN DESCRIPTION - DESCRIPTORS: DESCRIPTORS: ACHING

## 2021-09-30 ASSESSMENT — PAIN DESCRIPTION - ONSET: ONSET: ON-GOING

## 2021-09-30 NOTE — H&P
daily. Yes Historical Provider, MD   lisinopril (PRINIVIL;ZESTRIL) 40 MG tablet Take 40 mg by mouth daily   Yes Historical Provider, MD   amLODIPine (NORVASC) 10 MG tablet Take 10 mg by mouth daily   Yes Historical Provider, MD   ALPRAZolam (XANAX) 0.5 MG tablet Take 0.5 mg by mouth 2 times daily. Yes Historical Provider, MD   venlafaxine (EFFEXOR XR) 75 MG extended release capsule Take 75 mg by mouth daily   Yes Historical Provider, MD   ondansetron (ZOFRAN-ODT) 4 MG disintegrating tablet Take 4 mg by mouth every 8 hours as needed for Nausea or Vomiting   Yes Historical Provider, MD   Indapamide (LOZOL PO) Take 2.5 mg by mouth   Yes Historical Provider, MD   rosuvastatin (CRESTOR) 10 MG tablet Take 5 mg by mouth daily   Yes Historical Provider, MD   linaclotide (LINZESS) 145 MCG capsule Take 145 mcg by mouth every morning (before breakfast)   Yes Historical Provider, MD   aspirin 81 MG chewable tablet Take 81 mg by mouth daily    Historical Provider, MD       Allergies:  Iodine, Nsaids, and Sulfa antibiotics    Social History:      The patient currently lives with sister    TOBACCO:   reports that she has been smoking. She has been smoking about 0.50 packs per day. She uses smokeless tobacco.  ETOH:   reports previous alcohol use. Family History:      Reviewed in detail positive as follows:    History reviewed. No pertinent family history. REVIEW OF SYSTEMS:   Pertinent positives as noted in the HPI. All other systems reviewed and negative. PHYSICAL EXAM PERFORMED:    BP (!) 232/77   Pulse 64   Temp 98.8 °F (37.1 °C) (Oral)   Resp 18   Ht 5' 1\" (1.549 m)   Wt 108 lb 3.9 oz (49.1 kg)   SpO2 99%   BMI 20.45 kg/m²     General appearance:  Well developed, well nourished, elderly female lying in hospital bed in no apparent distress, appears stated age and cooperative. HEENT:  Normal cephalic, atraumatic without obvious deformity. Pupils equal, round, and reactive to light.   Conjunctivae/corneas clear.  Neck: Supple, with full range of motion. No jugular venous distention. Trachea midline. Respiratory:  Normal respiratory effort. Clear to auscultation, bilaterally without accessory muscle use. Cardiovascular:  Regular rate and rhythm without murmurs, no lower extremity edema. Abdomen: Soft, non-tender, non-distended, without rebound or guarding. Normal bowel sounds. Musculoskeletal:  Moves all extremities equally. Full range of motion without deformity. Skin: Skin warm, dry and intact. No rashes or lesions. Neurologic:  Neurovascularly intact without any focal sensory/motor deficits. Cranial nerves: II-XII intact, grossly non-focal.  Psychiatric:  Alert and oriented, thought content appropriate, normal insight  Capillary Refill: Brisk,< 3 seconds   Peripheral Pulses: +2 palpable, equal bilaterally       Labs:     Recent Labs     09/29/21  1208   WBC 8.7   HGB 13.3   HCT 40.1        Recent Labs     09/29/21  1208      K 3.5   CL 96*   CO2 23   BUN 12   CREATININE 0.8   CALCIUM 10.4     Recent Labs     09/29/21  1150 09/29/21  1208   AST 19 19   ALT 7* 7*   BILIDIR <0.2  --    BILITOT 0.7 0.7   ALKPHOS 29* 29*     Recent Labs     09/29/21  1208   INR 1.05     Recent Labs     09/29/21  1208   TROPONINI <0.01       Urinalysis:    No results found for: [place], [address], BACTERIA, RBCUA, BLOODU, [address], [address]    Radiology:     XR CHEST 1 VIEW   Final Result   No acute disease         CT HEAD WO CONTRAST   Final Result   No acute intracranial abnormality. Chronic small-vessel white matter ischemic changes. Left basal ganglia   lacunar infarct. Findings were discussed with Matilda Villareal at 12:21 pm on   9/29/2021.              ASSESSMENT:    Active Hospital Problems    Diagnosis Date Noted    TIA (transient ischemic attack) [G45.9] 09/29/2021         PLAN:    Possible TIA/CVA  - with symptoms: facial droop resolved  - admit to OBS to RO TIA/CVA  - out of the tPA window  -

## 2021-09-30 NOTE — PROGRESS NOTES
Patient requesting to go AMA-  Patient requested we call her sister- call placed- patient sister stated she is on her way to the hospital. Sister states MD had not called    Request made to MD to call sister   Name   Mona Bustamante   Relation: Brother/Sister   Mobile: [phone]

## 2021-09-30 NOTE — CONSULTS
Neurology Consult Note  Reason for Consult: TIA/CVA    Chief complaint: not eating well    Dr Ajit Moore MD asked me to see Stefan Haney in consultation for evaluation of TIA/CVA    History of Present Illness:  Stefan Haney is a 69 y.o. female who presents with feeling unwell. I obtained my information via interview w/ the patient, supplemented by chart review. The patient has a hx of snorting heroin. She tells me she last used two Saturdays ago, though per chart this may have been as recent as the 24th per  ED notes. She says she has not been eating very well for the past few days. No appetite. She tells me she was at [employer] the other day and was just feeling \"bummed\". Her sister, who is a recovering addict and is trying to get her on the right path, thought that she wasn't doing very well and thought she should come to the ED to be evaluated. While here, apparently the patient's sister though she had a bit of facial weakness though the patient cannot tell me which side. ED evaluation did not identify any facial weakness or any other focal neurologic deficits. She has been significantly hypertensive, well over  frequently. CT head was w/out any acute findings. She has a contrast allergy to CTA imaging was not pursued. Currently, she feels lousy in general though no specific complaints. She does say she has a hx of stroke in 2008 w/ some residual RLE weakness. She says that she has not been taking asa at home and has an allergy to it (can't elaborate). Also, she says she has not had her amlodipine at night since she was previously released from a hospitalization last Wednesday for stomach issues (?). Medical History:  Past Medical History:   Diagnosis Date    Asthma     [facility]erebral [facility] occlusion with [facility] infarction ([facility]     COPD (chronic obstructive pulmonary disease) (HCC)     Hypertension     Seizures ([facility]      History reviewed.  No pertinent surgical history. Scheduled Meds:   ALPRAZolam  0.25 mg Oral BID    amLODIPine  10 mg Oral Daily    gabapentin  300 mg Oral TID    lisinopril  40 mg Oral Daily    pantoprazole  40 mg Oral QAM AC    venlafaxine  75 mg Oral Daily    aspirin  81 mg Oral Daily    atorvastatin  40 mg Oral Nightly    clopidogrel  75 mg Oral Daily    enoxaparin  40 mg SubCUTAneous Daily    sodium chloride flush  5-40 mL IntraVENous 2 times per day     Medications Prior to Admission:   pantoprazole (PROTONIX) 40 MG tablet, Take 40 mg by mouth daily  gabapentin (NEURONTIN) 300 MG capsule, Take 300 mg by mouth 3 times daily. lisinopril (PRINIVIL;ZESTRIL) 40 MG tablet, Take 40 mg by mouth daily  amLODIPine (NORVASC) 5 MG tablet, Take 5 mg by mouth daily   ALPRAZolam (XANAX) 0.25 MG tablet, Take 0.25 mg by mouth 2 times daily. venlafaxine (EFFEXOR XR) 75 MG extended release capsule, Take 75 mg by mouth daily  Indapamide (LOZOL PO), Take 2.5 mg by mouth daily   rosuvastatin (CRESTOR) 5 MG tablet, Take 5 mg by mouth daily   linaclotide (LINZESS) 145 MCG capsule, Take 145 mcg by mouth every morning (before breakfast)  albuterol sulfate HFA (VENTOLIN HFA) 108 (90 Base) MCG/ACT inhaler, Inhale 2 puffs into the lungs every 6 hours as needed for Wheezing  aspirin 81 MG chewable tablet, Take 81 mg by mouth daily  ondansetron (ZOFRAN-ODT) 4 MG disintegrating tablet, Take 4 mg by mouth every 8 hours as needed for Nausea or Vomiting    Allergies   Allergen Reactions    Iodine     Nsaids     Sulfa Antibiotics      History reviewed. No pertinent family history.     Social History     Tobacco Use   Smoking Status Current Every Day Smoker    Packs/day: 0.50   Smokeless Tobacco Current User     Social History     Substance and Sexual Activity   Drug Use Yes    Types: Marijuana    Comment: heroin  snorts daily last used 5 days ago     Social History     Substance and Sexual Activity   Alcohol Use Not Currently     ROS:  Constitutional- No weight loss or fevers  Eyes- No diplopia. No photophobia. Ears/nose/throat- No dysphagia. No Dysarthria  Cardiovascular- No palpitations. No chest pain  Respiratory- No dyspnea. No Cough  Gastrointestinal- No Abdominal pain. No Vomiting. Genitourinary- No incontinence. No urinary retention  Musculoskeletal- No myalgia. No arthralgia  Skin- No rash. No easy bruising. Psychiatric- No depression. No anxiety  Endocrine- No diabetes. No thyroid issues. Hematologic- No bleeding difficulty. No fatigue  Neurologic- No weakness. No Headache. Exam:  Blood pressure (!) 209/88, pulse 69, temperature 98.5 °F (36.9 °C), temperature source Oral, resp. rate 16, height 5' 1\" (1.549 m), weight 108 lb 3.9 oz (49.1 kg), SpO2 98 %. Constitutional    Vital signs: BP, HR, and RR reviewed   General alert, no distress, well-nourished  Eyes: unable to visualize the fundi  Cardiovascular: no peripheral edema. Psychiatric: cooperative with examination, no psychotic behavior noted. Neurologic  Mental status:   orientation to person, place, time, situation. General fund of knowledge grossly intact   Memory grossly intact   Attention intact as able to attend well to the exam     Language fluent in conversation   Comprehension intact; follows simple commands  Cranial nerves:   CN2: visual fields full  CN 3,4,6: extraocular muscles intact  CN5: facial sensation symmetric   CN7: face symmetric without dysarthria  CN8: hearing grossly intact  CN9: palate elevated symmetrically  CN11: trap full strength on shoulder shrug  CN12: tongue midline with protrusion  Strength: baseline RLE weakness, otherwise good strength in other 3 limbs. Deep tendon reflexes: normal in all 4 extremities  Sensory: light touch intact in all 4 extremities. Cerebellar/coordination: finger nose finger normal without ataxia  Tone: normal in all 4 extremities  Gait: deferred at this time for safety given weakness.       Labs  Glucose 115  Na 133  K 3.5  BUN 11  Cr 0. 8    WBC 7.4K  Hg 12.5  Platelets 335    ZBF0C 5.4    Cholesterol, Total 185   HDL Cholesterol 55   LDL Calculated 111 (H)   Triglycerides 94   VLDL Cholesterol Calculated 19     Drug screen  Negative  ETOH negative    Studies  CT head w/o 9/29/21, independently reviewed  No acute intracranial abnormality. Chronic small-vessel white matter ischemic changes.  Left basal ganglia   lacunar infarct. Impression  1. The patient's sister reportedly felt that the patient had facial weakness upon arrival to the ED though it is not clear which side. ED staff did not identify any focal neurologic deficits nor does she have any new deficits during my encounter. She does have risk factors for stroke (HLD, HTN, smoking, previous stroke). 2.  Uncontrolled HTN. 3.  Hyperlipidemia. 4.  Smoker. 5.  Heroin abuse. Recommendations  1. It would be reasonable to complete stroke/TIA workup - MRI brain w/o, MRA head/neck w/o, TTE.    2.  Plavix 75 mg daily. She says she has an allergy to asa.    3.  High intensity statin. LDL < 70.    4.  Telemetry. 5.  Her BP need to be addressed. Discussed w/ RN. 6.  Smoking cessation. 7.  Telemetry.       Concha Lechuga NP  [address] [place] [address] [address] Neurology    A copy of this note was provided for Dr Sherita Aragon MD

## 2021-09-30 NOTE — PROGRESS NOTES
Speech Language Pathology  Facility/Department: [address] PROGRESSIVE CARE   CLINICAL BEDSIDE SWALLOW EVALUATION    NAME: Luis Angel Ponce  : 1950  MRN: [id number]    ADMISSION DATE: 2021  ADMITTING DIAGNOSIS: has TIA (transient ischemic attack) on their problem list.   · Has a past medical history of Asthma, Cerebral artery occlusion with cerebral infarction ([facility] [address] COPD (chronic obstructive pulmonary disease) ([facility] [address] [facility] Hypertension, and Seizures ([facility] [address] ONSET DATE: 2021    Recent Chest Xray: 2021  Impression   No acute disease     Head CT: 2021  Impression   No acute intracranial abnormality.       Chronic small-vessel white matter ischemic changes.  Left basal ganglia   lacunar infarct. HPI: 69 y.o. female with PMHx of Asthma, COPD and polysubstance abuse presented to [facility] in transfer from [facility] ED for evaluation of facial droop. Patient was brought in by her sister who was concerned that patient had a left facial droop and is in heroin withdrawal.  Patient last used heroin Friday, 5 days ago. Her sister brought her down from [place] to help her get clean. She was started on clonidine and antiemetic on Saturday after being seen in an outside hospital.  Sister states today she noted a facial droop. No other neurological findings. When patient was brought back to the emergency department they did not note a facial droop. She had no neurological deficit. She does complain of headache along with nausea, vomiting and diarrhea consistent with heroin withdrawal.    Date of Eval: 2021  Evaluating Therapist: Saúl Haney    Current Diet level:  Current Diet : Regular  Current Liquid Diet : Thin    Primary Complaint  Patient Complaint: Referral per CVA r/o protocol. Pt reports some inconsistent coughing while eating.     Pain:  Pain Assessment  Pain Assessment: 0-10  Pain Level: 9  Patient's Stated Pain Goal: No pain  Pain Type: Chronic pain  Pain Location: Abdomen, Head    Reason for Referral  Sandra Gomez was referred for a bedside swallow evaluation to assess the efficiency of her swallow function, identify signs and symptoms of aspiration and make recommendations regarding safe dietary consistencies, effective compensatory strategies, and safe eating environment. Impression  Dysphagia Diagnosis: Mild oral stage dysphagia;Mild pharyngeal stage dysphagia  Dysphagia Impression :   · Pt awake and alert upon SLP entry. Pt oriented to self, location, and time. Pt edentulous for assessment but reports wearing top/bottom dentures at baseline. Limited PO sample d/t pt nausea. · Mild oropharyngeal stage dysphagia characterized by prolonged but effective mastication for regular d/t edentulism, and decreased laryngeal elevation for all. No overt s/s of aspiration with any trials  · Recommend regular/thin. Meds PO.   · ST will f/u 1-3x to confirm safe PO tolerance and monitor for any SLP needs; appear to be resolved. Dysphagia Outcome Severity Scale: Level 5: Mild dysphagia- Distant supervision. May need one diet consistency restricted     Treatment Plan  Requires SLP Intervention: Yes  Duration/Frequency of Treatment: 1-3x to confirm PO tolerance and monitor for potential SLP needs    Recommended Diet and Intervention  Diet Solids Recommendation: Regular  Liquid Consistency Recommendation: Thin  Recommended Form of Meds: PO    Compensatory Swallowing Strategies  Compensatory Swallowing Strategies: Upright as possible for all oral intake;Remain upright for 30-45 minutes after meals    Treatment/Goals  Dysphagia Goals:   Goal 1: The patient will tolerate recommended diet without observed clinical signs of aspiration     General  Chart Reviewed: Yes  Behavior/Cognition: Alert; Cooperative;Lethargic  Respiratory Status: Room air  Communication Observation: Functional  Follows Directions: Complex  Dentition: Edentulous (Edentulous for assessment; pt reports wearing top and bottom dentures at baseline)  Patient Positioning: Upright in bed  Baseline Vocal Quality: Normal  Volitional Cough: Strong  Consistencies Administered: Reg solid; Thin - cup;Dysphagia Pureed (Dysphagia I)    Vision/Hearing  Vision  Vision:  (Functional for assessment purposes)  Hearing  Hearing:  (Functional for assessment purposes)    Oral Motor Deficits  Oral/Motor  Oral Motor: Within functional limits    Oral Phase Dysfunction  Oral Phase  Oral Phase: Exceptions  Oral Phase Dysfunction  Impaired Mastication: Reg Solid (Prolonged but effective)     Indicators of Pharyngeal Phase Dysfunction   Pharyngeal Phase  Pharyngeal Phase: Exceptions  Indicators of Pharyngeal Phase Dysfunction  Decreased Laryngeal Elevation: All    Prognosis  Prognosis  Prognosis for safe diet advancement: excellent  Individuals consulted  Consulted and agree with results and recommendations: Patient;RN    Education  Patient Education: results, recommendations  Patient Education Response: Verbalizes understanding       Therapy Time  Time in: 8:55 AM  Time out: 9:25 AM  Minutes: 30    This note serves as a D/C Summary in the event that this patient is discharged prior to the next therapy session. Signed by:  FRANCESCA Walters   in Speech-Language Pathology  9/30/2021 10:40 AM     Therapist was present, directed the patient's care, made skilled judgement, and was responsible for assessment and treatment of the patient.      Chi Pride MS, CCC-SLP #[id number]  Speech Language Pathologist

## 2021-09-30 NOTE — PROGRESS NOTES
Physical Therapy    Facility/Department: [facility] PROGRESSIVE CARE  Initial Assessment    NAME: Kenneth Phoenix  : 1950  MRN: [id number]    Date of Service: 2021    Discharge Recommendations:  24 hour supervision or assist   PT Equipment Recommendations  Equipment Needed: No  Jaret Husain scored a 19/24 on the AM-PAC short mobility form. At this time, no further PT is recommended upon discharge. Recommend patient returns to prior setting with prior services. Assessment   Body structures, Functions, Activity limitations: Decreased functional mobility   Assessment: per H&P \"Jaret Coello is a 69 y.o. female who presents to the emergency department with concern for left facial droop and heroin withdrawal. This patient has a past medical history of heroin abuse. She last used on Friday. This weekend she was seen at an outside hospital and started on clonidine and some other medications for heroin withdrawal. History was obtained mostly from the sister of the patient. The patient's sister states that the patient has been abusing heroin and she lives in [address] [place] [address]. She recently brought her down to [place] in an effort to be of help to get her off heroin. She last used on Friday. Saturday started having symptoms of withdrawal she was seen in outside hospital and started on antiemetic and clonidine. This morning she last was normal at 10:30 AM. When she came to the ED today she noticed that she had a little left facial droop. \" Pt appears close to her baseline; recommend home with 24/7 assist; will follow while in hosp  Prognosis: Good  Decision Making: Medium Complexity  PT Education: PT Role;General Safety  REQUIRES PT FOLLOW UP: Yes  Activity Tolerance  Activity Tolerance: Patient Tolerated treatment well       Patient Diagnosis(es): The primary encounter diagnosis was TIA (transient ischemic attack). Diagnoses of Heroin withdrawal ([facility] [address] and Hypomagnesemia were also pertinent to this visit.      has a past medical history of Asthma, Cerebral artery occlusion with cerebral infarction ([facility] [address] COPD (chronic obstructive pulmonary disease) ([facility] [address] Hypertension, and Seizures ([facility] [address] has no past surgical history on file. Restrictions  Restrictions/Precautions  Restrictions/Precautions: Contact Precautions  Position Activity Restriction  Other position/activity restrictions: C-diff R/O  Vision/Hearing  Vision:  (Functional for assessment purposes)  Hearing:  (Functional for assessment purposes)     Subjective  General  Chart Reviewed: Yes  Patient assessed for rehabilitation services?: Yes  Additional Pertinent Hx: Harleen Coates is a 69 y.o. female who presents to the emergency department with concern for left facial droop and heroin withdrawal.  This patient has a past medical history of heroin abuse. She last used on Friday. This weekend she was seen at an outside hospital and started on clonidine and some other medications for heroin withdrawal.  History was obtained mostly from the sister of the patient. The patient's sister states that the patient has been abusing heroin and she lives in [place]. She recently brought her down to [address] [place] [address] [place] in an effort to be of help to get her off heroin. She last used on Friday. Saturday started having symptoms of withdrawal she was seen in outside hospital and started on antiemetic and clonidine. This morning she last was normal at 10:30 AM.  When she came to the ED today she noticed that she had a little left facial droop. She was put into the room and will be checked by pulling out her mask we did not see a facial droop. Patient does complain of a headache since last evening. It was gradual in onset and worsened throughout the night and this morning. Patient has been having associated nausea vomiting and diarrhea from the heroin withdrawal.  She also complains of chronic right upper quadrant pain for the past year.   Response To Previous Treatment: Not applicable  Family / Caregiver Present: No  Follows Commands: Within Functional Limits  Subjective  Subjective: pt agreeable to getting up with therapy; reports I want to go back to [facility]; no c/o pain during session  Pain Screening  Patient Currently in Pain: Yes          Orientation  Orientation  Overall Orientation Status: Within Functional Limits  Social/Functional History  Social/Functional History  Lives With: Family (sister)  Type of Home: House  Home Layout: One level  Home Access: Stairs to enter with rails  Entrance Stairs - Number of Steps: 4  Home Equipment: Rolling walker, Cane  ADL Assistance: Needs assistance  Homemaking Assistance: Needs assistance  Ambulation Assistance: Needs assistance (RW or cane)  Transfer Assistance: Needs assistance  Additional Comments: Pt from [place] and staying with sister  Cognition   Cognition  Overall Cognitive Status: WFL    Objective          AROM RLE (degrees)  RLE AROM: WFL  AROM LLE (degrees)  LLE AROM : WFL  Strength RLE  Strength RLE: WFL  Strength LLE  Strength LLE: WFL        Bed mobility  Supine to Sit: Supervision  Sit to Supine: Unable to assess  Scooting: Supervision  Transfers  Sit to Stand: Contact guard assistance;Stand by assistance  Stand to sit: Contact guard assistance;Stand by assistance  Ambulation  Ambulation?: Yes  Ambulation 1  Surface: level tile  Device: Rolling Walker  Assistance: Contact guard assistance  Quality of Gait: small slow steps; no LOB  Distance: 25'  Comments: assisted with positioning in chair for comfort with LEs elevated and all needs in reach     Balance  Comments: sup for sitting balance; CGA for standing balance        Plan   Plan  Times per week: 1-2 more visits  Current Treatment Recommendations: Functional Mobility Training  Safety Devices  Type of devices: Chair alarm in place, Call light within reach, Left in chair, Nurse notified, All fall risk precautions in place    G-Code       OutComes Score AM-PAC Score  AM-PAC Inpatient Mobility Raw Score : 19 (09/30/21 1116)  AM-PAC Inpatient T-Scale Score : 45.44 (09/30/21 1116)  Mobility Inpatient CMS 0-100% Score: 41.77 (09/30/21 1116)  Mobility Inpatient CMS G-Code Modifier : CK (09/30/21 1116)          Goals  Short term goals  Time Frame for Short term goals: by discharge  Short term goal 1: transfers sup  Short term goal 2: amb 48' with RW SBA  Patient Goals   Patient goals : to go back home       Therapy Time   Individual Concurrent Group Co-treatment   Time In 1040         Time Out 1114         Minutes 34                 BERNICE GALVAN PT    Electronically signed by BERNICE GALVAN PT on 9/30/2021 at 11:17 AM

## 2021-09-30 NOTE — PROGRESS NOTES
Hospitalist Progress Note      PCP: No primary care provider on file. Chief Complaint. Presented to hospital for facial droop    Date of Admission: 9/29/2021    Subjective:   denies chest pain, nausea, vomiting, shortness of breath, fever or chills. mention feels overall better    Medications:  Reviewed    Infusion Medications    sodium chloride       Scheduled Medications    ALPRAZolam  0.25 mg Oral BID    gabapentin  300 mg Oral TID    lisinopril  40 mg Oral Daily    pantoprazole  40 mg Oral QAM AC    venlafaxine  75 mg Oral Daily    amLODIPine  5 mg Oral Nightly    atorvastatin  40 mg Oral Nightly    clopidogrel  75 mg Oral Daily    enoxaparin  40 mg SubCUTAneous Daily    sodium chloride flush  5-40 mL IntraVENous 2 times per day     PRN Meds: labetalol, hydrALAZINE, sodium chloride, ondansetron **OR** ondansetron, polyethylene glycol, sodium chloride flush      Intake/Output Summary (Last 24 hours) at 9/30/2021 1443  Last data filed at 9/30/2021 0133  Gross per 24 hour   Intake --   Output 400 ml   Net -400 ml       Physical Exam Performed:    BP (!) 217/95   Pulse 70   Temp 98.9 °F (37.2 °C) (Oral)   Resp 16   Ht 5' 1\" (1.549 m)   Wt 108 lb 3.9 oz (49.1 kg)   SpO2 99%   BMI 20.45 kg/m²     General appearance: NAD  HEENT:  Conjunctivae/corneas clear. Neck: Supple, with full range of motion. Respiratory:  Normal respiratory effort. Clear to auscultation, bilaterally without Rales/Wheezes/Rhonchi. Cardiovascular: Regular rate and rhythm with normal S1/S2 without murmurs or rubs  Abdomen: Soft, non-tender, non-distended, normal bowel sounds. Musculoskeletal: No cyanosis or edema bilaterally  Neurologic:  without any focal sensory/motor deficits.  grossly non-focal.  Psychiatric: Alert and oriented, Normal mood  Peripheral Pulses: +2 palpable, equal bilaterally       Labs:   Recent Labs     09/29/21  1208 09/30/21  0510   WBC 8.7 7.4   HGB 13.3 12.5   HCT 40.1 36.9    318 Recent Labs     09/29/21  1208 09/30/21  0510    133*   K 3.5 3.5   CL 96* 96*   CO2 23 23   BUN 12 11   CREATININE 0.8 0.8   CALCIUM 10.4 9.4     Recent Labs     09/29/21  1150 09/29/21  1208   AST 19 19   ALT 7* 7*   BILIDIR <0.2  --    BILITOT 0.7 0.7   ALKPHOS 29* 29*     Recent Labs     09/29/21  1208   INR 1.05     Recent Labs     09/29/21  1208   TROPONINI <0.01       Urinalysis:    No results found for: Braxton Scarce, BACTERIA, RBCUA, BLOODU, SPECGRAV, GLUCOSEU    Radiology:  XR CHEST 1 VIEW   Final Result   No acute disease         CT HEAD WO CONTRAST   Final Result   No acute intracranial abnormality. Chronic small-vessel white matter ischemic changes. Left basal ganglia   lacunar infarct. Findings were discussed with Sahra Wells at 12:21 pm on   9/29/2021. MRI BRAIN WO CONTRAST    (Results Pending)   MRA HEAD WO CONTRAST    (Results Pending)   MRA NECK WO CONTRAST    (Results Pending)         Assessment/Plan:    Active Hospital Problems    Diagnosis     Severe malnutrition ([facility] [address] [E43]     TIA (transient ischemic attack) [G45.9]        Possible TIA/CVA  - with symptoms: facial droop resolved  - admit to OBS to RO TIA/CVA  - out of the tPA window  - MRI pending  - ASA, statin  - consult neurology   - check lipids, HBA1c  - allow permissive HTN, SBP < 220, DBP < 110     Heroin withdrawal  - hx of daily heroin use, last used Friday 9/24 (sister trying to help pt detox)  - seen at OSH Saturday for withdrawal sx rx clonidine and antiemetics  DVT Prophylaxis: lovenox  Diet: ADULT DIET;  Regular  Adult Oral Nutrition Supplement; Standard High Calorie/High Protein Oral Supplement  Code Status: Full Code    PT/OT Eval Status: ordered    Dispo - after neurologic workup    Macey Mcelroy MD

## 2021-09-30 NOTE — PROGRESS NOTES
Brought to room via EMS Placed in bed oriented to room and call light Instructed not to get OOB without assistance.  States understanding

## 2021-09-30 NOTE — CARE COORDINATION
INITIAL CASE MANAGEMENT ASSESSMENT    Reviewed chart, no answer at pts room on phone, spoke  with patients sister Brianna King at [phone] to assess possible discharge needs. Explained Case Management role/services. Living Situation: pt had been living alone in [place]. Sister Latia Zuniga recently brought pt to [place] to live with her. Home address is [address] [place] [address], [address] [place] [address],   ADLs: pt was independent pta. DME:     PT/OT Recs:24/7 supervision. Active Services: none     Transportation: family to transport pt to sisters home at [facility]. Medications: pt will use [facility] pharmacy at [facility] on [address]. PCP: none here in [place]. Pt will need a [facility] PCP listing or sister Walt Dec call  [facility] which is where sister goes. HD/PD: none    PLAN/COMMENTS: plan to contact pt when able to discuss dc needs. Sister states pt has had no chemical dependency treatment in pt nor out pt. Pt is to return to [facility] home at dc. Pt has insurance with [employer] in [place]. Electronically signed by CAYDEN Aiken on 9/30/2021 at 4:07 PM    EMERITA/GREGG provided contact information for patient or family to call with any questions. EMERITA/CM will follow and assist as needed.

## 2021-09-30 NOTE — PROGRESS NOTES
Comprehensive Nutrition Assessment    Type and Reason for Visit:  Initial, Positive Nutrition Screen    Nutrition Recommendations/Plan:   Continue current diet  Monitor PO intakes  Start Ensure Enlive TID    Nutrition Assessment:  + Nutrition screen MST 3. Pt admitted with c/o facial drooping and heroin withdrawal. Dx with TIA. PMH includes COPD and HTN. RD did not visit pt d/t C-diff isolation. Was able to assess malnutrition through pt room window. Pt experienced 16% wt loss in 5 mo per care everywhere wts. Current diet is regular with no intakes on EMR. Per EMR, pt reports poor intake prior to admission and no appetite. Continue current diet and start Ensure Enlive TID. Malnutrition Assessment:  Malnutrition Status:  Severe malnutrition    Context:  Chronic Illness     Findings of the 6 clinical characteristics of malnutrition:  Energy Intake:  Mild decrease in energy intake (Comment) (Pt report on EMR poor intake prior to admission)  Weight Loss:  7 - Greater than 10% over 6 months     Body Fat Loss:  7 - Severe body fat loss Orbital, Buccal region   Muscle Mass Loss:  7 - Severe muscle mass loss Temples (temporalis)  Fluid Accumulation:  No significant fluid accumulation     Strength:  Not Performed    Estimated Daily Nutrient Needs:  Energy (kcal):  0715-3888 kcal (25-30 kcal/kg CBW 49 kg); Weight Used for Energy Requirements:  Current     Protein (g):  49-59 gm (1.0-1.2 g/kg CBW 49 kg); Weight Used for Protein Requirements:  Current        Fluid (ml/day): 1 ml/kcal      Nutrition Related Findings:  No edema noted, +BM 9/29      Wounds:  None       Current Nutrition Therapies:    ADULT DIET;  Regular    Anthropometric Measures:  · Height: 5' 1\" (154.9 cm)  · Current Body Weight: 108 lb (49 kg)   · Admission Body Weight: 108 lb (49 kg)    · Usual Body Weight: 128 lb (58.1 kg) (4/10/21 per care everywhere)     · Ideal Body Weight: 105 lbs;   · BMI: 20.4  · BMI Categories: Underweight (BMI less than 22) age over 65       Nutrition Diagnosis:   · Severe malnutrition related to inadequate protein-energy intake as evidenced by severe loss of subcutaneous fat, severe muscle loss, poor intake prior to admission, weight loss greater than or equal to 10% in 6 months      Nutrition Interventions:   Food and/or Nutrient Delivery:  Continue Current Diet, Start Oral Nutrition Supplement  Nutrition Education/Counseling:  No recommendation at this time   Coordination of Nutrition Care:  Continue to monitor while inpatient    Goals:  PO intakes greater than 50% of meals and supplements       Nutrition Monitoring and Evaluation:   Behavioral-Environmental Outcomes:  None Identified   Food/Nutrient Intake Outcomes:  Food and Nutrient Intake, Supplement Intake  Physical Signs/Symptoms Outcomes:  Biochemical Data, Nutrition Focused Physical Findings, Skin, Weight     Discharge Planning:     Too soon to determine     Electronically signed by Yovanny Bates on 9/30/21 at 12:19 PM EDT    Contact: [phone]

## 2021-09-30 NOTE — PLAN OF CARE
Problem: Pain:  Goal: Pain level will decrease  Description: Pain level will decrease  Outcome: Ongoing  Goal: Control of acute pain  Description: Control of acute pain  Outcome: Ongoing  Goal: Control of chronic pain  Description: Control of chronic pain  Outcome: Ongoing   Alert and oriented x4 Resp. Easy and even Sats.  WNL on RA Lungs diminished Cough and deep breathing exercises encouraged Up to bathroom with walker and SBA Free from fall/injury Frequently turns and repositions self

## 2021-09-30 NOTE — PROGRESS NOTES
worsened throughout the night and this morning. Patient has been having associated nausea vomiting and diarrhea from the heroin withdrawal.  She also complains of chronic right upper quadrant pain for the past year. PTA pt from home with assist from sister where pt needed assist for mobility and ADLs. Pt currently requires CGA and use of RW for mobility and transfers. Anticipate pt needing up to Min/Mod A for ADLs. Pt presents with generalized weakness. Pt will benefit from skilled OT services at this time. Anticipate pt safe to return home with prior level of asisst at this time. Prognosis: Good  Decision Making: Low Complexity  Exam: see above  Assistance / Modification: RW  OT Education: OT Role;Plan of Care;Transfer Training  REQUIRES OT FOLLOW UP: Yes  Activity Tolerance  Activity Tolerance: Patient Tolerated treatment well;Patient limited by fatigue  Safety Devices  Safety Devices in place: Yes  Type of devices: Chair alarm in place;Call light within reach;Nurse notified; Left in chair           Patient Diagnosis(es): The primary encounter diagnosis was TIA (transient ischemic attack). Diagnoses of Heroin withdrawal ([facility] and Hypomagnesemia were also pertinent to this visit. has a past medical history of Asthma, Cerebral artery occlusion with cerebral infarction ([facility] COPD (chronic obstructive pulmonary disease) ([facility] [facility] Hypertension, and Seizures ([facility] has no past surgical history on file. Restrictions  Restrictions/Precautions  Restrictions/Precautions: Contact Precautions  Position Activity Restriction  Other position/activity restrictions: C-diff R/O    Subjective   General  Chart Reviewed: Yes  Patient assessed for rehabilitation services?: Yes  Additional Pertinent Hx: per ED note, Harleen Coates is a 69 y.o. female who presents to the emergency department with concern for left facial droop and heroin withdrawal.  This patient has a past medical history of heroin abuse.   She last used on Friday. This weekend she was seen at an outside hospital and started on clonidine and some other medications for heroin withdrawal.  History was obtained mostly from the sister of the patient. The patient's sister states that the patient has been abusing heroin and she lives in [place]. She recently brought her down to [place] in an effort to be of help to get her off heroin. She last used on Friday. Saturday started having symptoms of withdrawal she was seen in outside hospital and started on antiemetic and clonidine. This morning she last was normal at 10:30 AM.  When she came to the ED today she noticed that she had a little left facial droop. She was put into the room and will be checked by pulling out her mask we did not see a facial droop. Patient does complain of a headache since last evening. It was gradual in onset and worsened throughout the night and this morning. Patient has been having associated nausea vomiting and diarrhea from the heroin withdrawal.  She also complains of chronic right upper quadrant pain for the past year. Family / Caregiver Present: No  Referring Practitioner: Ty Alfonso MD  Subjective  Subjective: Pt agreeable to OT evaluation. Pt with no reports of pain. General Comment  Comments: okay for therapy per RN.     Social/Functional History  Social/Functional History  Lives With: Family (sister)  Type of Home: House  Home Layout: One level  Home Access: Stairs to enter with rails  Entrance Stairs - Number of Steps: 4  Home Equipment: Rolling walker, Cane  ADL Assistance: Needs assistance  Homemaking Assistance: Needs assistance  Ambulation Assistance: Needs assistance (RW or cane)  Transfer Assistance: Needs assistance  Additional Comments: Pt from [place] and staying with sister       Objective   Vision:  (Functional for assessment purposes)  Hearing:  (Functional for assessment purposes)    Orientation  Overall Orientation Status: Within Functional Limits     Balance  Sitting Balance: Supervision  Standing Balance: Contact guard assistance (RW)  Functional Mobility  Functional - Mobility Device: Rolling Walker  Activity: Other (short household distances in room; ~20ft)  Assist Level: Contact guard assistance  Functional Mobility Comments: no overt LOB; no reports of light headedness or dizziness  Wheelchair Bed Transfers  Wheelchair/Bed - Technique: Ambulating (RW)  Equipment Used: Bed;Other (bed to chair)  Level of Asssistance: Contact guard assistance  ADL  Additional Comments: Anticipate pt needing up to Min/Mod A for ADLs based on ROM, strength, and balance  Tone RUE  RUE Tone: Normotonic  Tone LUE  LUE Tone: Normotonic  Coordination  Movements Are Fluid And Coordinated: Yes     Bed mobility  Supine to Sit: Supervision  Sit to Supine: Unable to assess  Scooting: Supervision  Transfers  Sit to stand: Contact guard assistance  Stand to sit: Contact guard assistance  Transfer Comments: to/from RW     Cognition  Overall Cognitive Status: WFL                 LUE AROM (degrees)  LUE AROM : WFL  RUE AROM (degrees)  RUE AROM : WFL  LUE Strength  Gross LUE Strength: WFL  RUE Strength  Gross RUE Strength: WFL                   Plan   Plan  Times per week: 3-5x  Current Treatment Recommendations: Strengthening, Functional Mobility Training, Balance Training, Safety Education & Training, Patient/Caregiver Education & Training, Self-Care / ADL, Endurance Training, Equipment Evaluation, Education, & procurement         AM-PAC Score        AM-[facility] Inpatient Daily Activity Raw Score: 19 (09/30/21 1113)  AM-PAC Inpatient ADL T-Scale Score : 40.22 (09/30/21 1113)  ADL Inpatient CMS 0-100% Score: 42.8 (09/30/21 1113)  ADL Inpatient CMS G-Code Modifier : CK (09/30/21 1113)    Goals  Short term goals  Time Frame for Short term goals: prior to D/C  Short term goal 1: complete functional mobility and transfers with supervision  Short term goal 2: complete toileting with supervision  Short term goal 3: complete bathing and dressing with Min A  Short term goal 4: complete grooming with setup  Long term goals  Time Frame for Long term goals : STG=LTG  Patient Goals   Patient goals : return to [address]   Time In 1045         Time Out 1111         Minutes 26         Timed Code Treatment Minutes: 11 Minutes (15 minute eval)       Ally Baez, OTR/L

## 2021-09-30 NOTE — PROGRESS NOTES
4 Eyes Skin Assessment     NAME:  Monta Lundborg  YOB: 1950  MEDICAL RECORD NUMBER:  [id number]    The patient is being assess for  Admission    I agree that 2 RN's have performed a thorough Head to Toe Skin Assessment on the patient. ALL assessment sites listed below have been assessed. Areas assessed by both nurses:    Head, Face, Ears, Shoulders, Back, Chest, Arms, Elbows, Hands, Sacrum. Buttock, Coccyx, Ischium and Legs. Feet and Heels        Does the Patient have a Wound? Yes wound(s) were present on assessment.  LDA wound assessment was Initiated and completed        Lewis Prevention initiated:  Yes   Wound Care Orders initiated:  NA    Pressure Injury (Stage 3,4, Unstageable, DTI, NWPT, and Complex wounds) if present place consult order under [de-identified] NA    New and Established Ostomies if present place consult order under : NA      Nurse 1 eSignature: Electronically signed by Sharyne Apley, RN on 9/29/21 at 11:32 PM EDT    **SHARE this note so that the co-signing nurse is able to place an eSignature**    Nurse 2 eSignature: Electronically signed by Malcolm Galeas RN on 9/29/21 at 11:33 PM EDT

## 2021-09-30 NOTE — PROGRESS NOTES
Patient refused aspirin and plavix this am. Educated patient on importance of medications with evidence of a \"left basal ganglia infarct\" on her CT. Afternoon gabapentin refused as well. Upon returning from Echo and MRI, refusing 2 of the 3 MRIs, stating Chencho Avila will not get them today and not tomorrow either,\" she refused her telemetry. Patient stating \"I want to leave,\" requesting to go AMA. Charge RN spoke with pt, who requested that her sister be called. Sister is on her way. MD notified.

## 2021-09-30 NOTE — PROGRESS NOTES
Pharmacy Medication Reconciliation Note     List of medications patient is currently taking is complete. Source of information:   1. [facility]      Notes regarding home medications:   1. Changed Amlodipine to 5mg daily   2.  Added schedule of daily for Indapamide    Denies taking any other OTC or herbal medications    Deana Nicholas, [address] [place] [address] 9/30/2021 10:52 AM

## 2021-09-30 NOTE — PLAN OF CARE
Nutrition Problem #1: Severe malnutrition  Intervention: Food and/or Nutrient Delivery: Continue Current Diet, Start Oral Nutrition Supplement  Nutritional Goals: PO intakes greater than 50% of meals and supplements

## 2021-10-01 ENCOUNTER — APPOINTMENT (OUTPATIENT)
Dept: GENERAL RADIOLOGY | Age: 71
DRG: 069 | End: 2021-10-01
Payer: MEDICARE

## 2021-10-01 LAB
ANION GAP SERPL CALCULATED.3IONS-SCNC: 15 MMOL/L (ref 3–16)
BASOPHILS ABSOLUTE: 0 K/UL (ref 0–0.2)
BASOPHILS RELATIVE PERCENT: 0.5 %
BUN BLDV-MCNC: 15 MG/DL (ref 7–20)
CALCIUM SERPL-MCNC: 9.6 MG/DL (ref 8.3–10.6)
CHLORIDE BLD-SCNC: 98 MMOL/L (ref 99–110)
CO2: 22 MMOL/L (ref 21–32)
CREAT SERPL-MCNC: 0.9 MG/DL (ref 0.6–1.2)
EOSINOPHILS ABSOLUTE: 0.1 K/UL (ref 0–0.6)
EOSINOPHILS RELATIVE PERCENT: 1.2 %
GFR AFRICAN AMERICAN: >60
GFR NON-AFRICAN AMERICAN: >60
GLUCOSE BLD-MCNC: 110 MG/DL (ref 70–99)
HCT VFR BLD CALC: 38.8 % (ref 36–48)
HEMOGLOBIN: 13.1 G/DL (ref 12–16)
LYMPHOCYTES ABSOLUTE: 2.7 K/UL (ref 1–5.1)
LYMPHOCYTES RELATIVE PERCENT: 38 %
MCH RBC QN AUTO: 32 PG (ref 26–34)
MCHC RBC AUTO-ENTMCNC: 33.7 G/DL (ref 31–36)
MCV RBC AUTO: 94.9 FL (ref 80–100)
MONOCYTES ABSOLUTE: 0.4 K/UL (ref 0–1.3)
MONOCYTES RELATIVE PERCENT: 6.2 %
NEUTROPHILS ABSOLUTE: 3.8 K/UL (ref 1.7–7.7)
NEUTROPHILS RELATIVE PERCENT: 54.1 %
OCCULT BLOOD SCREENING: NORMAL
PDW BLD-RTO: 13.5 % (ref 12.4–15.4)
PLATELET # BLD: 327 K/UL (ref 135–450)
PMV BLD AUTO: 7.8 FL (ref 5–10.5)
POTASSIUM SERPL-SCNC: 3.5 MMOL/L (ref 3.5–5.1)
RBC # BLD: 4.09 M/UL (ref 4–5.2)
SODIUM BLD-SCNC: 135 MMOL/L (ref 136–145)
WBC # BLD: 7 K/UL (ref 4–11)

## 2021-10-01 PROCEDURE — 36415 COLL VENOUS BLD VENIPUNCTURE: CPT

## 2021-10-01 PROCEDURE — 2580000003 HC RX 258: Performed by: STUDENT IN AN ORGANIZED HEALTH CARE EDUCATION/TRAINING PROGRAM

## 2021-10-01 PROCEDURE — 6360000002 HC RX W HCPCS: Performed by: STUDENT IN AN ORGANIZED HEALTH CARE EDUCATION/TRAINING PROGRAM

## 2021-10-01 PROCEDURE — 2500000003 HC RX 250 WO HCPCS: Performed by: INTERNAL MEDICINE

## 2021-10-01 PROCEDURE — 6370000000 HC RX 637 (ALT 250 FOR IP): Performed by: INTERNAL MEDICINE

## 2021-10-01 PROCEDURE — 94760 N-INVAS EAR/PLS OXIMETRY 1: CPT

## 2021-10-01 PROCEDURE — 80048 BASIC METABOLIC PNL TOTAL CA: CPT

## 2021-10-01 PROCEDURE — 92526 ORAL FUNCTION THERAPY: CPT

## 2021-10-01 PROCEDURE — 74018 RADEX ABDOMEN 1 VIEW: CPT

## 2021-10-01 PROCEDURE — 6360000002 HC RX W HCPCS: Performed by: INTERNAL MEDICINE

## 2021-10-01 PROCEDURE — 2060000000 HC ICU INTERMEDIATE R&B

## 2021-10-01 PROCEDURE — 82270 OCCULT BLOOD FECES: CPT

## 2021-10-01 PROCEDURE — 9990000010 HC NO CHARGE VISIT: Performed by: PHYSICAL THERAPIST

## 2021-10-01 PROCEDURE — 6370000000 HC RX 637 (ALT 250 FOR IP): Performed by: STUDENT IN AN ORGANIZED HEALTH CARE EDUCATION/TRAINING PROGRAM

## 2021-10-01 PROCEDURE — 85025 COMPLETE CBC W/AUTO DIFF WBC: CPT

## 2021-10-01 PROCEDURE — 6370000000 HC RX 637 (ALT 250 FOR IP): Performed by: NURSE PRACTITIONER

## 2021-10-01 RX ORDER — MORPHINE SULFATE 2 MG/ML
1 INJECTION, SOLUTION INTRAMUSCULAR; INTRAVENOUS EVERY 4 HOURS PRN
Status: DISCONTINUED | OUTPATIENT
Start: 2021-10-01 | End: 2021-10-02

## 2021-10-01 RX ORDER — LABETALOL HYDROCHLORIDE 5 MG/ML
10 INJECTION, SOLUTION INTRAVENOUS EVERY 4 HOURS PRN
Status: DISCONTINUED | OUTPATIENT
Start: 2021-10-01 | End: 2021-10-05 | Stop reason: HOSPADM

## 2021-10-01 RX ADMIN — ONDANSETRON 4 MG: 2 INJECTION INTRAMUSCULAR; INTRAVENOUS at 09:39

## 2021-10-01 RX ADMIN — VENLAFAXINE HYDROCHLORIDE 75 MG: 75 CAPSULE, EXTENDED RELEASE ORAL at 11:39

## 2021-10-01 RX ADMIN — LISINOPRIL 40 MG: 40 TABLET ORAL at 11:39

## 2021-10-01 RX ADMIN — AMLODIPINE BESYLATE 5 MG: 5 TABLET ORAL at 20:40

## 2021-10-01 RX ADMIN — Medication 10 ML: at 11:41

## 2021-10-01 RX ADMIN — CLOPIDOGREL BISULFATE 75 MG: 75 TABLET ORAL at 11:43

## 2021-10-01 RX ADMIN — MORPHINE SULFATE 1 MG: 2 INJECTION, SOLUTION INTRAMUSCULAR; INTRAVENOUS at 17:42

## 2021-10-01 RX ADMIN — ALPRAZOLAM 0.25 MG: 0.25 TABLET ORAL at 20:40

## 2021-10-01 RX ADMIN — GABAPENTIN 300 MG: 300 CAPSULE ORAL at 20:40

## 2021-10-01 RX ADMIN — GABAPENTIN 300 MG: 300 CAPSULE ORAL at 11:40

## 2021-10-01 RX ADMIN — PANTOPRAZOLE SODIUM 40 MG: 40 TABLET, DELAYED RELEASE ORAL at 05:29

## 2021-10-01 RX ADMIN — ALPRAZOLAM 0.25 MG: 0.25 TABLET ORAL at 11:40

## 2021-10-01 RX ADMIN — LABETALOL HYDROCHLORIDE 10 MG: 5 INJECTION INTRAVENOUS at 09:42

## 2021-10-01 RX ADMIN — HYDRALAZINE HYDROCHLORIDE 10 MG: 20 INJECTION INTRAMUSCULAR; INTRAVENOUS at 05:29

## 2021-10-01 RX ADMIN — Medication 10 ML: at 09:42

## 2021-10-01 RX ADMIN — GABAPENTIN 300 MG: 300 CAPSULE ORAL at 14:42

## 2021-10-01 RX ADMIN — Medication 10 ML: at 20:41

## 2021-10-01 ASSESSMENT — PAIN SCALES - GENERAL
PAINLEVEL_OUTOF10: 0
PAINLEVEL_OUTOF10: 9
PAINLEVEL_OUTOF10: 0

## 2021-10-01 ASSESSMENT — PAIN DESCRIPTION - DESCRIPTORS: DESCRIPTORS: ACHING;HEADACHE

## 2021-10-01 ASSESSMENT — PAIN DESCRIPTION - PAIN TYPE
TYPE: ACUTE PAIN
TYPE: ACUTE PAIN

## 2021-10-01 ASSESSMENT — PAIN DESCRIPTION - PROGRESSION: CLINICAL_PROGRESSION: NOT CHANGED

## 2021-10-01 ASSESSMENT — PAIN - FUNCTIONAL ASSESSMENT: PAIN_FUNCTIONAL_ASSESSMENT: ACTIVITIES ARE NOT PREVENTED

## 2021-10-01 ASSESSMENT — PAIN DESCRIPTION - ORIENTATION: ORIENTATION: ANTERIOR;MID

## 2021-10-01 ASSESSMENT — PAIN DESCRIPTION - FREQUENCY: FREQUENCY: INTERMITTENT

## 2021-10-01 ASSESSMENT — PAIN DESCRIPTION - LOCATION: LOCATION: HEAD

## 2021-10-01 ASSESSMENT — PAIN DESCRIPTION - ONSET: ONSET: ON-GOING

## 2021-10-01 NOTE — PLAN OF CARE
Problem: Pain:  Goal: Pain level will decrease  Description: Pain level will decrease  Outcome: Met This Shift  Goal: Control of acute pain  Description: Control of acute pain  Outcome: Met This Shift  Goal: Control of chronic pain  Description: Control of chronic pain  Outcome: Met This Shift   Alert and oriented Non-compliant with care refuses tele. Box and IV even with much encouragement. Resp. Easy and even Sats. WNL on RA Lungs diminished. Cough and deep breathing exercises encourafged. Cont. Per bathroom indep.  D/t refusing bed alarm Frequently turns and repositions self

## 2021-10-01 NOTE — PROGRESS NOTES
Behavior/Cognition: Alert, Cooperative, Lethargic    Positioning Upright in bed    PO Trials:  · Thin Liquids: Decreased laryngeal elevation, no overt s/s of aspiration or penetration with continuous gulps of thin liquid via straw  · Nectar thick liquids: DNT  · Honey Thick liquids: DNT  · Puree: DNT  · Soft food: DNT  · Regular food: Patient reports min difficulty with regular textures, slow prolonged mastication with adequate clearance of bolus, tolerated with no overt s/s of aspiration or penetration     Dysphagia Tx:   Direct Dysphagia tx: PO tolerance as indicated above. Patient reports reduced limited PO intake reporting \"I don't have an appetite\". Pt reports min mastication difficulties with regular texture solids, but declining downgrade to soft and bite-sized. Dysphagia ex: N/A  Training in compensatory strategies: Educated on taking small bites and choosing softer foods to assist with mastication difficulties. Pt response to ex/training: Verbalized understanding    Goals:   Dysphagia Goals: The patient will tolerate recommended diet without observed clinical signs of aspiration Ongoing  The patient will tolerate repeat BSE when able. Ongoing    Assessment:   Impressions:   Dysphagia Diagnosis: Mild oral stage dysphagia, Mild pharyngeal stage dysphagia     Dysphagia Impression :   · Pt awake and alert upon SLP entry. Pt oriented to self, location, and time. Pt edentulous for assessment but reports wearing top/bottom dentures at baseline. Limited PO sample d/t reduced appetite. · Mild oropharyngeal stage dysphagia characterized by prolonged but effective mastication for regular d/t edentulism, and decreased laryngeal elevation for all. No overt s/s of aspiration with any trials  · Recommend regular/thin.  Meds PO.   · ST to f/u 1x to monitor for diet tolerance    Diet Recommendations: Regular, thin   Recommended Form of Meds: PO    Strategies:   Compensatory Swallowing Strategies: Upright as possible for all oral intake, Remain upright for 30-45 minutes after meals    Education:  Consulted and agree with results and recommendations: Patient, RN  Patient Education: results, recommendations  Patient Education Response: Verbalizes understanding    Prognosis: Good for diet tolerance    Plan:     Continue Dysphagia Therapy:   Interventions: Therapeutic Interventions: Therapeutic PO trials with SLP, Diet tolerance monitoring     Duration/Frequency of therapy while on unit: Duration/Frequency of Treatment  Duration/Frequency of Treatment: 1-3x to confirm PO tolerance and monitor for SLP eval  Discharge Instructions:  Anticipate NO for further skilled Speech Therapy for Dysphagia at discharge    This note serves as a D/C Summary in the event that this patient is discharged prior to the next therapy session.     Coded treatment time: 0  Total treatment time: [facility], [address] [place] [address], [address] [place] [address]  Speech-Language Pathologist  On 10/01/21 at 2:25 PM

## 2021-10-01 NOTE — PROGRESS NOTES
Hospitalist Progress Note      PCP: No primary care provider on file. Chief Complaint. Presented to hospital for facial droop    Date of Admission: 9/29/2021    Subjective:   No acute events overnight, denies chest pain, nausea, vomiting, shortness of breath, fever or chills. mention feels overall better    Medications:  Reviewed    Infusion Medications    sodium chloride       Scheduled Medications    ALPRAZolam  0.25 mg Oral BID    gabapentin  300 mg Oral TID    lisinopril  40 mg Oral Daily    pantoprazole  40 mg Oral QAM AC    venlafaxine  75 mg Oral Daily    amLODIPine  5 mg Oral Nightly    nicotine  1 patch TransDERmal Daily    atorvastatin  40 mg Oral Nightly    clopidogrel  75 mg Oral Daily    enoxaparin  40 mg SubCUTAneous Daily    sodium chloride flush  5-40 mL IntraVENous 2 times per day     PRN Meds: morphine, labetalol, hydrALAZINE, sodium chloride, ondansetron **OR** ondansetron, polyethylene glycol, sodium chloride flush      Intake/Output Summary (Last 24 hours) at 10/1/2021 1550  Last data filed at 10/1/2021 0828  Gross per 24 hour   Intake --   Output 400 ml   Net -400 ml       Physical Exam Performed:    BP (!) 171/75   Pulse 89   Temp 98.3 °F (36.8 °C) (Oral)   Resp 18   Ht 5' 1\" (1.549 m)   Wt 111 lb 15.9 oz (50.8 kg)   SpO2 96%   BMI 21.16 kg/m²     General appearance: NAD  HEENT:  Conjunctivae/corneas clear. Neck: Supple, with full range of motion. Respiratory:  Normal respiratory effort. Clear to auscultation, bilaterally without Rales/Wheezes/Rhonchi. Cardiovascular: Regular rate and rhythm with normal S1/S2 without murmurs or rubs  Abdomen: Soft, non-tender, non-distended, normal bowel sounds. Musculoskeletal: No cyanosis or edema bilaterally  Neurologic:  without any focal sensory/motor deficits.  grossly non-focal.  Psychiatric: Alert and oriented, Normal mood  Peripheral Pulses: +2 palpable, equal bilaterally       Labs:   Recent Labs     09/29/21  1208 09/30/21  0510 10/01/21  0451   WBC 8.7 7.4 7.0   HGB 13.3 12.5 13.1   HCT 40.1 36.9 38.8    318 327     Recent Labs     09/29/21  1208 09/30/21  0510 10/01/21  0451    133* 135*   K 3.5 3.5 3.5   CL 96* 96* 98*   CO2 23 23 22   BUN 12 11 15   CREATININE 0.8 0.8 0.9   CALCIUM 10.4 9.4 9.6     Recent Labs     09/29/21  1150 09/29/21  1208   AST 19 19   ALT 7* 7*   BILIDIR <0.2  --    BILITOT 0.7 0.7   ALKPHOS 29* 29*     Recent Labs     09/29/21  1208   INR 1.05     Recent Labs     09/29/21  1208   TROPONINI <0.01       Urinalysis:    No results found for: NITRU, WBCUA, BACTERIA, RBCUA, BLOODU, SPECGRAV, GLUCOSEU    Radiology:  XR ABDOMEN (KUB) (SINGLE AP VIEW)   Final Result   No significant finding in the abdomen. 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 Otherwise, no acute abnormality or flow-limiting stenosis in the major   arteries of the head. MRI BRAIN WO CONTRAST         XR CHEST 1 VIEW   Final Result   No acute disease         CT HEAD WO CONTRAST   Final Result   No acute intracranial abnormality. Chronic small-vessel white matter ischemic changes. Left basal ganglia   lacunar infarct. Findings were discussed with Sahra Wells at 12:21 pm on   9/29/2021.          MRA NECK WO CONTRAST    (Results Pending)         Assessment/Plan:    Active Hospital Problems    Diagnosis     Severe malnutrition ([facility] [E43]     TIA (transient ischemic attack) [G45.9]      Patient refused MRI yesterday    Possible TIA/CVA  - with symptoms: facial droop resolved, MRI brain ordered - patient refused yesterday  - out of the tPA window  - MRI pending  - ASA, statin  - consult neurology   - check lipids, HBA1c  - allow permissive HTN, SBP < 220, DBP < 110     Heroin withdrawal  - hx of daily heroin use, last used Friday 9/24 (sister trying to help pt detox)  - seen at OSH Saturday for withdrawal sx rx clonidine and

## 2021-10-01 NOTE — CONSULTS
Consults   Department of Neurosurgery  [facility]   Physician Assistant Consult Note        Requesting Physician:  Sherita Aragon MD    CHIEF COMPLAINT:  Facial weakness     History Obtained From:  patient    HISTORY OF PRESENT ILLNESS:                The patient is a 69 y.o. female who presents with concern for left facial droop and heroin withdrawal. She has history of heroin use, last use last Friday and began experiencing symptoms of withdrawal, presented to the ER and also complained of left sided facial weakness. 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 She currently reports to be doing well and denies any type of headache or facial weakness. She denies any numbness tingling or weakness, or any other neurological symptoms. Past Medical History:        Diagnosis Date    Asthma     Cerebral artery occlusion with cerebral infarction ([facility]     COPD [facility]chronic obstructive pulmonary disease) ([facility])     Hypertension     Seizures ([facility])      Past Surgical History:    History reviewed. No pertinent surgical history.   Current Medications:   Current Facility-Administered Medications: morphine (PF) injection 1 mg, 1 mg, IntraVENous, Q4H PRN  labetalol (NORMODYNE;TRANDATE) injection 10 mg, 10 mg, IntraVENous, Q4H PRN  ALPRAZolam (XANAX) tablet 0.25 mg, 0.25 mg, Oral, BID  gabapentin (NEURONTIN) capsule 300 mg, 300 mg, Oral, TID  lisinopril (PRINIVIL;ZESTRIL) tablet 40 mg, 40 mg, Oral, Daily  pantoprazole (PROTONIX) tablet 40 mg, 40 mg, Oral, QAM AC  venlafaxine (EFFEXOR XR) extended release capsule 75 mg, 75 mg, Oral, Daily  amLODIPine (NORVASC) tablet 5 mg, 5 mg, Oral, Nightly  hydrALAZINE (APRESOLINE) injection 10 mg, 10 mg, IntraVENous, Q6H PRN  nicotine (NICODERM CQ) 14 MG/24HR 1 patch, 1 patch, TransDERmal, Daily  atorvastatin (LIPITOR) tablet 40 mg, 40 mg, Oral, Nightly  clopidogrel (PLAVIX) tablet 75 mg, 75 mg, Oral, Daily  0.9 % sodium chloride infusion, 25 mL, IntraVENous, PRN  enoxaparin (LOVENOX) injection 40 mg, 40 mg, SubCUTAneous, Daily  ondansetron (ZOFRAN-ODT) disintegrating tablet 4 mg, 4 mg, Oral, Q8H PRN **OR** ondansetron (ZOFRAN) injection 4 mg, 4 mg, IntraVENous, Q6H PRN  polyethylene glycol (GLYCOLAX) packet 17 g, 17 g, Oral, Daily PRN  sodium chloride flush 0.9 % injection 5-40 mL, 5-40 mL, IntraVENous, 2 times per day  sodium chloride flush 0.9 % injection 5-40 mL, 5-40 mL, IntraVENous, PRN  Allergies:  Iodine, Nsaids, and Sulfa antibiotics    Social History:   TOBACCO:   reports that she has been smoking. She has been smoking about 0.50 packs per day. She uses smokeless tobacco.  ETOH:   reports previous alcohol use. DRUGS:   reports current drug use. Drug: Marijuana. Family History:   History reviewed. No pertinent family history. REVIEW OF SYSTEMS:  CONSTITUTIONAL:  negative for  fevers, chills and sweats  EYES:  negative for  double vision, blurred vision and visual disturbance  HEENT:  negative for  hearing loss, hoarseness and voice change  MUSCULOSKELETAL:  negative for  myalgias, arthralgias, pain and muscle weakness    PHYSICAL EXAM:  VITALS:  BP (!) 160/72   Pulse 89   Temp 97.8 °F (36.6 °C) (Oral)   Resp 18   Ht 5' 1\" (1.549 m)   Wt 111 lb 15.9 oz (50.8 kg)   SpO2 100%   BMI 21.16 kg/m²   CONSTITUTIONAL:  awake, alert, cooperative, no apparent distress, and appears stated age  EYES:  Lids and lashes normal, pupils equal, round and reactive to light, extra ocular muscles intact, sclera clear, conjunctiva normal  NECK:  Supple, symmetrical, trachea midline, no adenopathy, thyroid symmetric, not enlarged and no tenderness, skin normal  BACK:  Symmetric, no curvature, spinous processes are non-tender on palpation, paraspinous muscles are non-tender on palpation, no costal vertebral tenderness  MUSCULOSKELETAL:  There is no redness, warmth, or swelling of the joints. Full range of motion noted.   Motor strength is 5 out of 5 all extremities bilaterally. Tone is normal.  NEUROLOGIC:  Awake, alert, oriented to name, place and time. Cranial nerves II-XII are grossly intact. Motor is 5 out of 5 bilaterally. Cerebellar finger to nose, heel to shin intact. Sensory is intact. Babinski down going, Romberg negative, and gait is normal.  DATA:  Radiology Review: MRI was stopped after MRA H done, this demonstrates a 4mm saccular aneurysm at the anterior communicating artery     IMPRESSION/RECOMMENDATIONS:      70 yo female patient found to have a 4mm aneurysm at the anterior communicating artery, overall not symptomatic from this, not currently having any headaches or other neurological symptoms    At this point no neurosurgical intervention recommended. Would recommend follow up on an outpatient basis with Dr. Alpa Alba at [address] [place] [address] and Spine for follow of the aneurysm. Maintain blood pressure control once normalized. Continue with other medical management otherwise.

## 2021-10-01 NOTE — PROGRESS NOTES
Patient's sister at bedside, requesting charge RN and MD. MD notified and requested to come to bedside stat for further and current evaluation.

## 2021-10-01 NOTE — PROGRESS NOTES
Per charge RN, MD contacted via perfect serve in regards to pt's hypertension, the potential need for a COWs and potential need for neuro/stroke workup.

## 2021-10-02 ENCOUNTER — APPOINTMENT (OUTPATIENT)
Dept: MRI IMAGING | Age: 71
DRG: 069 | End: 2021-10-02
Payer: MEDICARE

## 2021-10-02 LAB
ANION GAP SERPL CALCULATED.3IONS-SCNC: 15 MMOL/L (ref 3–16)
BASOPHILS ABSOLUTE: 0 K/UL (ref 0–0.2)
BASOPHILS RELATIVE PERCENT: 0.6 %
BUN BLDV-MCNC: 37 MG/DL (ref 7–20)
CALCIUM SERPL-MCNC: 9.3 MG/DL (ref 8.3–10.6)
CHLORIDE BLD-SCNC: 96 MMOL/L (ref 99–110)
CO2: 23 MMOL/L (ref 21–32)
CREAT SERPL-MCNC: 1.6 MG/DL (ref 0.6–1.2)
EOSINOPHILS ABSOLUTE: 0.2 K/UL (ref 0–0.6)
EOSINOPHILS RELATIVE PERCENT: 2.9 %
GFR AFRICAN AMERICAN: 38
GFR NON-AFRICAN AMERICAN: 32
GLUCOSE BLD-MCNC: 114 MG/DL (ref 70–99)
HCT VFR BLD CALC: 36.8 % (ref 36–48)
HEMOGLOBIN: 12.1 G/DL (ref 12–16)
LYMPHOCYTES ABSOLUTE: 3.5 K/UL (ref 1–5.1)
LYMPHOCYTES RELATIVE PERCENT: 43.8 %
MCH RBC QN AUTO: 31.2 PG (ref 26–34)
MCHC RBC AUTO-ENTMCNC: 33 G/DL (ref 31–36)
MCV RBC AUTO: 94.8 FL (ref 80–100)
MONOCYTES ABSOLUTE: 0.6 K/UL (ref 0–1.3)
MONOCYTES RELATIVE PERCENT: 6.9 %
NEUTROPHILS ABSOLUTE: 3.7 K/UL (ref 1.7–7.7)
NEUTROPHILS RELATIVE PERCENT: 45.8 %
PDW BLD-RTO: 13.4 % (ref 12.4–15.4)
PLATELET # BLD: 305 K/UL (ref 135–450)
PMV BLD AUTO: 7.8 FL (ref 5–10.5)
POTASSIUM SERPL-SCNC: 3.9 MMOL/L (ref 3.5–5.1)
RBC # BLD: 3.88 M/UL (ref 4–5.2)
SODIUM BLD-SCNC: 134 MMOL/L (ref 136–145)
WBC # BLD: 8.1 K/UL (ref 4–11)

## 2021-10-02 PROCEDURE — 2580000003 HC RX 258: Performed by: STUDENT IN AN ORGANIZED HEALTH CARE EDUCATION/TRAINING PROGRAM

## 2021-10-02 PROCEDURE — 6370000000 HC RX 637 (ALT 250 FOR IP): Performed by: INTERNAL MEDICINE

## 2021-10-02 PROCEDURE — 70551 MRI BRAIN STEM W/O DYE: CPT

## 2021-10-02 PROCEDURE — 6360000002 HC RX W HCPCS: Performed by: STUDENT IN AN ORGANIZED HEALTH CARE EDUCATION/TRAINING PROGRAM

## 2021-10-02 PROCEDURE — 36415 COLL VENOUS BLD VENIPUNCTURE: CPT

## 2021-10-02 PROCEDURE — 70547 MR ANGIOGRAPHY NECK W/O DYE: CPT

## 2021-10-02 PROCEDURE — 6360000002 HC RX W HCPCS: Performed by: INTERNAL MEDICINE

## 2021-10-02 PROCEDURE — 6370000000 HC RX 637 (ALT 250 FOR IP): Performed by: NURSE PRACTITIONER

## 2021-10-02 PROCEDURE — 2580000003 HC RX 258: Performed by: INTERNAL MEDICINE

## 2021-10-02 PROCEDURE — 94760 N-INVAS EAR/PLS OXIMETRY 1: CPT

## 2021-10-02 PROCEDURE — 85025 COMPLETE CBC W/AUTO DIFF WBC: CPT

## 2021-10-02 PROCEDURE — 80048 BASIC METABOLIC PNL TOTAL CA: CPT

## 2021-10-02 PROCEDURE — 6370000000 HC RX 637 (ALT 250 FOR IP): Performed by: STUDENT IN AN ORGANIZED HEALTH CARE EDUCATION/TRAINING PROGRAM

## 2021-10-02 PROCEDURE — 2060000000 HC ICU INTERMEDIATE R&B

## 2021-10-02 RX ORDER — 0.9 % SODIUM CHLORIDE 0.9 %
500 INTRAVENOUS SOLUTION INTRAVENOUS ONCE
Status: COMPLETED | OUTPATIENT
Start: 2021-10-02 | End: 2021-10-02

## 2021-10-02 RX ORDER — CALCIUM CARBONATE 200(500)MG
500 TABLET,CHEWABLE ORAL 3 TIMES DAILY PRN
Status: DISCONTINUED | OUTPATIENT
Start: 2021-10-02 | End: 2021-10-05 | Stop reason: HOSPADM

## 2021-10-02 RX ORDER — ACETAMINOPHEN 325 MG/1
650 TABLET ORAL EVERY 6 HOURS PRN
Status: DISCONTINUED | OUTPATIENT
Start: 2021-10-02 | End: 2021-10-05 | Stop reason: HOSPADM

## 2021-10-02 RX ORDER — DIPHENHYDRAMINE HCL 25 MG
25 TABLET ORAL NIGHTLY PRN
Status: DISCONTINUED | OUTPATIENT
Start: 2021-10-02 | End: 2021-10-05 | Stop reason: HOSPADM

## 2021-10-02 RX ORDER — TRAMADOL HYDROCHLORIDE 50 MG/1
25 TABLET ORAL EVERY 6 HOURS PRN
Status: DISCONTINUED | OUTPATIENT
Start: 2021-10-02 | End: 2021-10-05 | Stop reason: HOSPADM

## 2021-10-02 RX ORDER — DICYCLOMINE HCL 20 MG
20 TABLET ORAL EVERY 6 HOURS PRN
Status: DISCONTINUED | OUTPATIENT
Start: 2021-10-02 | End: 2021-10-05 | Stop reason: HOSPADM

## 2021-10-02 RX ORDER — GABAPENTIN 300 MG/1
300 CAPSULE ORAL EVERY 8 HOURS PRN
Status: DISCONTINUED | OUTPATIENT
Start: 2021-10-02 | End: 2021-10-05 | Stop reason: HOSPADM

## 2021-10-02 RX ORDER — LANOLIN ALCOHOL/MO/W.PET/CERES
3 CREAM (GRAM) TOPICAL NIGHTLY
Status: DISCONTINUED | OUTPATIENT
Start: 2021-10-02 | End: 2021-10-05 | Stop reason: HOSPADM

## 2021-10-02 RX ADMIN — ALPRAZOLAM 0.25 MG: 0.25 TABLET ORAL at 20:46

## 2021-10-02 RX ADMIN — GABAPENTIN 300 MG: 300 CAPSULE ORAL at 09:39

## 2021-10-02 RX ADMIN — PANTOPRAZOLE SODIUM 40 MG: 40 TABLET, DELAYED RELEASE ORAL at 06:07

## 2021-10-02 RX ADMIN — Medication 10 ML: at 09:41

## 2021-10-02 RX ADMIN — CLOPIDOGREL BISULFATE 75 MG: 75 TABLET ORAL at 09:39

## 2021-10-02 RX ADMIN — LISINOPRIL 40 MG: 40 TABLET ORAL at 09:39

## 2021-10-02 RX ADMIN — DICYCLOMINE HYDROCHLORIDE 20 MG: 20 TABLET ORAL at 16:07

## 2021-10-02 RX ADMIN — GABAPENTIN 300 MG: 300 CAPSULE ORAL at 14:36

## 2021-10-02 RX ADMIN — Medication 10 ML: at 20:47

## 2021-10-02 RX ADMIN — TRAMADOL HYDROCHLORIDE 25 MG: 50 TABLET ORAL at 20:45

## 2021-10-02 RX ADMIN — Medication 3 MG: at 20:46

## 2021-10-02 RX ADMIN — ALPRAZOLAM 0.25 MG: 0.25 TABLET ORAL at 07:46

## 2021-10-02 RX ADMIN — ACETAMINOPHEN 650 MG: 325 TABLET ORAL at 11:46

## 2021-10-02 RX ADMIN — SODIUM CHLORIDE 500 ML: 9 INJECTION, SOLUTION INTRAVENOUS at 16:52

## 2021-10-02 RX ADMIN — AMLODIPINE BESYLATE 5 MG: 5 TABLET ORAL at 20:47

## 2021-10-02 RX ADMIN — GABAPENTIN 300 MG: 300 CAPSULE ORAL at 20:45

## 2021-10-02 RX ADMIN — MORPHINE SULFATE 1 MG: 2 INJECTION, SOLUTION INTRAMUSCULAR; INTRAVENOUS at 12:13

## 2021-10-02 RX ADMIN — ONDANSETRON 4 MG: 2 INJECTION INTRAMUSCULAR; INTRAVENOUS at 14:36

## 2021-10-02 RX ADMIN — ANTACID TABLETS 500 MG: 500 TABLET, CHEWABLE ORAL at 16:07

## 2021-10-02 ASSESSMENT — PAIN DESCRIPTION - PROGRESSION
CLINICAL_PROGRESSION: NOT CHANGED

## 2021-10-02 ASSESSMENT — PAIN DESCRIPTION - DESCRIPTORS
DESCRIPTORS: ACHING

## 2021-10-02 ASSESSMENT — PAIN DESCRIPTION - FREQUENCY
FREQUENCY: INTERMITTENT

## 2021-10-02 ASSESSMENT — PAIN DESCRIPTION - PAIN TYPE
TYPE: ACUTE PAIN

## 2021-10-02 ASSESSMENT — PAIN SCALES - GENERAL
PAINLEVEL_OUTOF10: 0
PAINLEVEL_OUTOF10: 0
PAINLEVEL_OUTOF10: 9
PAINLEVEL_OUTOF10: 3
PAINLEVEL_OUTOF10: 0
PAINLEVEL_OUTOF10: 9
PAINLEVEL_OUTOF10: 0
PAINLEVEL_OUTOF10: 9

## 2021-10-02 ASSESSMENT — PAIN DESCRIPTION - LOCATION
LOCATION: GENERALIZED
LOCATION: GENERALIZED
LOCATION: ABDOMEN

## 2021-10-02 ASSESSMENT — PAIN - FUNCTIONAL ASSESSMENT
PAIN_FUNCTIONAL_ASSESSMENT: PREVENTS OR INTERFERES WITH MANY ACTIVE NOT PASSIVE ACTIVITIES
PAIN_FUNCTIONAL_ASSESSMENT: PREVENTS OR INTERFERES SOME ACTIVE ACTIVITIES AND ADLS
PAIN_FUNCTIONAL_ASSESSMENT: PREVENTS OR INTERFERES SOME ACTIVE ACTIVITIES AND ADLS

## 2021-10-02 ASSESSMENT — PAIN DESCRIPTION - ONSET
ONSET: ON-GOING

## 2021-10-02 NOTE — PLAN OF CARE
Problem: Skin Integrity:  Goal: Will show no infection signs and symptoms  Description: Will show no infection signs and symptoms  10/2/2021 1027 by Deysi Redmond RN  Outcome: Ongoing  10/2/2021 0022 by Jessica Clayton RN  Outcome: Ongoing  Note: Assessing yee scale Q shift. Performing skin assessments every shift. Maintaining dry and clean skin. Promoting adequate nutritional intake. Heels elevated off bed. Performing skin care q shift. Utilizing lift equipment when indicated. Goal: Absence of new skin breakdown  Description: Absence of new skin breakdown  Outcome: Ongoing     Problem: HEMODYNAMIC STATUS  Goal: Patient has stable vital signs and fluid balance  Outcome: Ongoing     Problem: ACTIVITY INTOLERANCE/IMPAIRED MOBILITY  Goal: Mobility/activity is maintained at optimum level for patient  Outcome: Ongoing     Problem: COMMUNICATION IMPAIRMENT  Goal: Ability to express needs and understand communication  Outcome: Ongoing     Problem: Nutrition  Goal: Optimal nutrition therapy  10/2/2021 1027 by Deysi Redmond RN  Outcome: Ongoing  10/2/2021 0022 by Jessica Clayton RN  Outcome: Ongoing  Note: Encouraging pt to eat at least 50% of all meals. Assisting with feeding when needed. collaborating with dietician for optimal nutrition. Problem: Falls - Risk of:  Goal: Will remain free from falls  Description: Will remain free from falls  10/2/2021 1027 by Deysi Redmond RN  Outcome: Ongoing  10/2/2021 0022 by Jessica Clayton RN  Outcome: Ongoing  Note: All fall precautions in place, bed in lowest position, frequent rounding to assist with toileting. Pt absent of fall this shift.    Goal: Absence of physical injury  Description: Absence of physical injury  Outcome: Ongoing     Problem: Safety:  Goal: Free from accidental physical injury  Description: Free from accidental physical injury  Outcome: Ongoing  Goal: Free from intentional harm  Description: Free from intentional harm  Outcome: Ongoing     Problem: Infection:  Goal: Will remain free from infection  Description: Will remain free from infection  Outcome: Ongoing     Problem: Daily Care:  Goal: Daily care needs are met  Description: Daily care needs are met  Outcome: Ongoing     Problem: Skin Integrity:  Goal: Skin integrity will stabilize  Description: Skin integrity will stabilize  Outcome: Ongoing     Problem: Discharge Planning:  Goal: Patients continuum of care needs are met  Description: Patients continuum of care needs are met  Outcome: Ongoing     Problem:  Activity:  Goal: Ability to tolerate increased activity will improve  Description: Ability to tolerate increased activity will improve  Outcome: Ongoing

## 2021-10-02 NOTE — PROGRESS NOTES
Hospitalist Progress Note      PCP: No primary care provider on file. Chief Complaint. Presented to hospital for facial droop    Date of Admission: 9/29/2021    Subjective: Cr went up overnight, denies chest pain, nausea, vomiting, shortness of breath, fever or chills. mention feels overall better than yesterday    Medications:  Reviewed    Infusion Medications    sodium chloride       Scheduled Medications    melatonin  3 mg Oral Nightly    sodium chloride  500 mL IntraVENous Once    ALPRAZolam  0.25 mg Oral BID    gabapentin  300 mg Oral TID    [Held by provider] lisinopril  40 mg Oral Daily    pantoprazole  40 mg Oral QAM AC    venlafaxine  75 mg Oral Daily    amLODIPine  5 mg Oral Nightly    nicotine  1 patch TransDERmal Daily    atorvastatin  40 mg Oral Nightly    clopidogrel  75 mg Oral Daily    enoxaparin  40 mg SubCUTAneous Daily    sodium chloride flush  5-40 mL IntraVENous 2 times per day     PRN Meds: acetaminophen, dicyclomine, diphenhydrAMINE, calcium carbonate, gabapentin, traMADol, labetalol, hydrALAZINE, sodium chloride, ondansetron **OR** ondansetron, polyethylene glycol, sodium chloride flush      Intake/Output Summary (Last 24 hours) at 10/2/2021 1806  Last data filed at 10/2/2021 1305  Gross per 24 hour   Intake 1770 ml   Output --   Net 1770 ml       Physical Exam Performed:    BP (!) 161/77   Pulse 93   Temp 98.1 °F (36.7 °C) (Oral)   Resp 15   Ht 5' 1\" (1.549 m)   Wt 111 lb 15.9 oz (50.8 kg)   SpO2 99%   BMI 21.16 kg/m²     General appearance: NAD  HEENT:  Conjunctivae/corneas clear  Neck: Supple, with full range of motion. Respiratory:  Normal respiratory effort. Clear to auscultation, bilaterally without Rales/Wheezes/Rhonchi. Cardiovascular: Regular rate and rhythm with normal S1/S2 without murmurs or rubs  Abdomen: Soft, non-tender, non-distended, normal bowel sounds.   Musculoskeletal: No cyanosis or edema bilaterally  Neurologic:  without any focal sensory/motor deficits. grossly non-focal.  Psychiatric: Alert and oriented, Normal mood  Peripheral Pulses: +2 palpable, equal bilaterally       Labs:   Recent Labs     09/30/21  0510 10/01/21  0451 10/02/21  0454   WBC 7.4 7.0 8.1   HGB 12.5 13.1 12.1   HCT 36.9 38.8 36.8    327 305     Recent Labs     09/30/21  0510 10/01/21  0451 10/02/21  0454   * 135* 134*   K 3.5 3.5 3.9   CL 96* 98* 96*   CO2 23 22 23   BUN 11 15 37*   CREATININE 0.8 0.9 1.6*   CALCIUM 9.4 9.6 9.3     No results for input(s): AST, ALT, BILIDIR, BILITOT, ALKPHOS in the last 72 hours. No results for input(s): INR in the last 72 hours. No results for input(s): [facility] in the last 72 hours. Urinalysis:    No results found for: Elby Lester, BACTERIA, RBCUA, BLOODU, [address], [address]    Radiology:  MRA NECK WO CONTRAST   Final Result   Normal contrast-enhanced MRA of the neck. No evidence of significant   stenosis. MRI BRAIN WO CONTRAST   Final Result   1. No evidence of acute infarct, hemorrhage, mass effect/midline shift, or   ventriculomegaly. 2. Moderate microvascular ischemic disease with a chronic left basal ganglia   lacunar infarct. 3. Focal flow-void in the anterior communicating artery region likely related   to a saccular aneurysm. Please refer to 09/30/2021 MRA head. XR ABDOMEN (KUB) (SINGLE AP VIEW)   Final Result   No significant finding in the abdomen. 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 Otherwise, no acute abnormality or flow-limiting stenosis in the major   arteries of the head. XR CHEST 1 VIEW   Final Result   No acute disease         CT HEAD WO CONTRAST   Final Result   No acute intracranial abnormality. Chronic small-vessel white matter ischemic changes. Left basal ganglia   lacunar infarct. Findings were discussed with Rosa Maria Sage at 12:21 pm on   9/29/2021.          VL Extremity Venous Bilateral    (Results Pending)   VL Extremity Venous Bilateral    (Results Pending)         Assessment/Plan:    Active Hospital Problems    Diagnosis     Severe malnutrition ([facility] [E43]     TIA (transient ischemic attack) [G45.9]      Patient refused MRI yesterday    Possible TIA/CVA  - with symptoms: facial droop resolved, MRI brain ordered - negative for CVA  - out of the tPA window  - ASA, statin  - consult neurology   - check lipids, HBA1c  - allow permissive HTN, SBP < 220, DBP < 110    JOHN - hold lisinopril, monitor BMP, s/p bolus IVF     Heroin withdrawal  - hx of daily heroin use, last used Friday 9/24 (sister trying to help pt detox) - CM consulted  - seen at [facility] Saturday for withdrawal sx rx clonidine and antiemetics  DVT Prophylaxis: lovenox  Diet: ADULT DIET;  Regular  Adult Oral Nutrition Supplement; Standard High Calorie/High Protein Oral Supplement  Code Status: Full Code    PT/OT Eval Status: ordered    Dispo - after neurologic workup    Sherita Aragon MD

## 2021-10-02 NOTE — PLAN OF CARE
Problem: Pain:  Goal: Pain level will decrease  Description: Pain level will decrease  Outcome: Ongoing  Note: Assessing pain using appropriate pain rating scale q shift and PRN. Medicating pt as prescribed and indicated. Offering pt non-pharmacologic pain interventions. Reassessing pain and pts response to pain intervention. Problem: Skin Integrity:  Goal: Will show no infection signs and symptoms  Description: Will show no infection signs and symptoms  Outcome: Ongoing  Note: Assessing yee scale Q shift. Performing skin assessments every shift. Maintaining dry and clean skin. Promoting adequate nutritional intake. Heels elevated off bed. Performing skin care q shift. Utilizing lift equipment when indicated. Problem: Nutrition  Goal: Optimal nutrition therapy  Outcome: Ongoing  Note: Encouraging pt to eat at least 50% of all meals. Assisting with feeding when needed. collaborating with dietician for optimal nutrition. Problem: Falls - Risk of:  Goal: Will remain free from falls  Description: Will remain free from falls  Outcome: Ongoing  Note: All fall precautions in place, bed in lowest position, frequent rounding to assist with toileting. Pt absent of fall this shift.

## 2021-10-02 NOTE — PROGRESS NOTES
Neurology Consult Note    Updates:  - neurosurgery did not recommend neurosurgical intervention at this point.  - Patient's sister was in room an reported that the face had right eye and mouth  was where the weakness was. Patient is not currently having facial weakness.  - She still has stomach pain. Past Medical History:   Diagnosis Date    Asthma     Cerebral artery occlusion with cerebral infarction ([facility]     COPD (chronic obstructive pulmonary disease) ([facility])     Hypertension     Seizures ([facility])        Scheduled Meds:   ALPRAZolam  0.25 mg Oral BID    gabapentin  300 mg Oral TID    lisinopril  40 mg Oral Daily    pantoprazole  40 mg Oral QAM AC    venlafaxine  75 mg Oral Daily    amLODIPine  5 mg Oral Nightly    nicotine  1 patch TransDERmal Daily    atorvastatin  40 mg Oral Nightly    clopidogrel  75 mg Oral Daily    enoxaparin  40 mg SubCUTAneous Daily    sodium chloride flush  5-40 mL IntraVENous 2 times per day     Continuous Infusions:   sodium chloride       PRN Meds:.acetaminophen, morphine, labetalol, hydrALAZINE, sodium chloride, ondansetron **OR** ondansetron, polyethylene glycol, sodium chloride flush    Exam:  Blood pressure 128/71, pulse 99, temperature 98 °F (36.7 °C), resp. rate 17, height 5' 1\" (1.549 m), weight 111 lb 15.9 oz (50.8 kg), SpO2 98 %. Constitutional    Vital signs: BP, HR, and RR reviewed   General Alert, no distress, but does hold hand on abdomen for pain  Psychiatric: cooperative with examination, no  psychotic behavior noted. Neurologic  Mental status:   orientation to person and place   General fund of knowledge grossly intact   Memory grossly intact   Attention intact as able to attend well to the exam     Language fluent in conversation   Comprehension intact; follows simple commands  Cranial nerves:   CN7:face symmetric without dysarthria,   CN8: hearing grossly intact  Strength: No prontator drift.   Good strength in extremities, but right LE limited by pain.  Sensory: light touch intact in all 4 extremities. Labs:  HgA1c 5.4     Cholesterol, Total 185   HDL Cholesterol 55   LDL Calculated 111 (H)   Triglycerides 94   VLDL Cholesterol Calculated 19       Studies:  CT head w/o 9/29/21, independently reviewed  No acute intracranial abnormality. Chronic small-vessel white matter ischemic changes.  Left basal ganglia   lacunar infarct. TTE 9-  Summary   A bubble study was performed and shows evidence of right to left shunting   consistent with a patent foramen ovale or atrial septal defect.   Mild concentric LVH with normal LV size and wall motion. EF is    70%. Grade   I diastolic dysfunction with normal LV filling pressures.   Trivial mitral regurgitation.   The right ventricle is normal in size and function.      Left Atrium   The left atrium is normal in size.   Redundancy of the interatrial septum.     MRA H WO 9-  Impression   4 mm saccular aneurysm at the anterior communicating artery.       Otherwise, no acute abnormality or flow-limiting stenosis in the major   arteries of the head. MRA Neck 10-2-2021  Impression   Normal contrast-enhanced MRA of the neck.  No evidence of significant   stenosis. MRI Brain WO 10-2-2021     Impression   1. No evidence of acute infarct, hemorrhage, mass effect/midline shift, or   ventriculomegaly. 2. Moderate microvascular ischemic disease with a chronic left basal ganglia   lacunar infarct. 3. Focal flow-void in the anterior communicating artery region likely related   to a saccular aneurysm.  Please refer to 09/30/2021 MRA head. Impression  1. The patient's sister felt that the patient had right upper and lower facial weakness upon arrival to the ED. ED staff did not identify any focal neurologic deficits nor does she have any new deficits during my encounter. She does have risk factors for stroke (HLD, HTN, smoking, previous stroke). 2.  Uncontrolled HTN. 3.  Hyperlipidemia. 4.  Smoker. 5.  Heroin abuse. 6. aneurysm - neurosurgery has evaluated     Recommendations  1. Plavix 75 mg daily. She says she has an allergy to asa.    2.  LDL goal < 70. Patient's sister reports that in the [facility] population that there have been studies that statins are not good. I have not heard of this. Usually statin is recommended for neuroprotective. Will defer to primary team.    3.  slowly lower BP to normotension  4. Dopplers of extremities  5. Smoking and heroin cessation. 6.  Telemetry. If dopplers of extremities are normal, will sign off. Will follow in periphery for results.       Anamika Huggins MD  Neurology

## 2021-10-02 NOTE — PROGRESS NOTES
MD Hines notified of patient refusing lovenox.  Electronically signed by Vanda Moise RN on 10/2/2021 at 12:25 PM

## 2021-10-02 NOTE — PROGRESS NOTES
Patient's renal fx results discussed with doctor Hines. Patient reports better appetite and noticed tolerating 100% of her meals and drinks today. Per MD Hines, continue monitoring.  Electronically signed by Neo Kumar RN on 10/2/2021 at 1:05 PM

## 2021-10-03 LAB
ANION GAP SERPL CALCULATED.3IONS-SCNC: 13 MMOL/L (ref 3–16)
BASOPHILS ABSOLUTE: 0 K/UL (ref 0–0.2)
BASOPHILS RELATIVE PERCENT: 0.3 %
BUN BLDV-MCNC: 43 MG/DL (ref 7–20)
CALCIUM SERPL-MCNC: 9.3 MG/DL (ref 8.3–10.6)
CHLORIDE BLD-SCNC: 99 MMOL/L (ref 99–110)
CO2: 22 MMOL/L (ref 21–32)
CREAT SERPL-MCNC: 1.4 MG/DL (ref 0.6–1.2)
EOSINOPHILS ABSOLUTE: 0.3 K/UL (ref 0–0.6)
EOSINOPHILS RELATIVE PERCENT: 4.3 %
GFR AFRICAN AMERICAN: 45
GFR NON-AFRICAN AMERICAN: 37
GLUCOSE BLD-MCNC: 103 MG/DL (ref 70–99)
HCT VFR BLD CALC: 33.9 % (ref 36–48)
HEMOGLOBIN: 11.3 G/DL (ref 12–16)
LYMPHOCYTES ABSOLUTE: 2.9 K/UL (ref 1–5.1)
LYMPHOCYTES RELATIVE PERCENT: 39.2 %
MCH RBC QN AUTO: 31.5 PG (ref 26–34)
MCHC RBC AUTO-ENTMCNC: 33.2 G/DL (ref 31–36)
MCV RBC AUTO: 94.9 FL (ref 80–100)
MONOCYTES ABSOLUTE: 0.5 K/UL (ref 0–1.3)
MONOCYTES RELATIVE PERCENT: 6.1 %
NEUTROPHILS ABSOLUTE: 3.8 K/UL (ref 1.7–7.7)
NEUTROPHILS RELATIVE PERCENT: 50.1 %
PDW BLD-RTO: 13.7 % (ref 12.4–15.4)
PLATELET # BLD: 262 K/UL (ref 135–450)
PMV BLD AUTO: 7.6 FL (ref 5–10.5)
POTASSIUM SERPL-SCNC: 3.9 MMOL/L (ref 3.5–5.1)
RBC # BLD: 3.57 M/UL (ref 4–5.2)
SODIUM BLD-SCNC: 134 MMOL/L (ref 136–145)
WBC # BLD: 7.5 K/UL (ref 4–11)

## 2021-10-03 PROCEDURE — 6370000000 HC RX 637 (ALT 250 FOR IP): Performed by: INTERNAL MEDICINE

## 2021-10-03 PROCEDURE — 6360000002 HC RX W HCPCS: Performed by: STUDENT IN AN ORGANIZED HEALTH CARE EDUCATION/TRAINING PROGRAM

## 2021-10-03 PROCEDURE — 2060000000 HC ICU INTERMEDIATE R&B

## 2021-10-03 PROCEDURE — 6370000000 HC RX 637 (ALT 250 FOR IP): Performed by: STUDENT IN AN ORGANIZED HEALTH CARE EDUCATION/TRAINING PROGRAM

## 2021-10-03 PROCEDURE — 94760 N-INVAS EAR/PLS OXIMETRY 1: CPT

## 2021-10-03 PROCEDURE — 2580000003 HC RX 258: Performed by: STUDENT IN AN ORGANIZED HEALTH CARE EDUCATION/TRAINING PROGRAM

## 2021-10-03 PROCEDURE — 6370000000 HC RX 637 (ALT 250 FOR IP): Performed by: NURSE PRACTITIONER

## 2021-10-03 PROCEDURE — 85025 COMPLETE CBC W/AUTO DIFF WBC: CPT

## 2021-10-03 PROCEDURE — 80048 BASIC METABOLIC PNL TOTAL CA: CPT

## 2021-10-03 PROCEDURE — 36415 COLL VENOUS BLD VENIPUNCTURE: CPT

## 2021-10-03 RX ORDER — NICOTINE 21 MG/24HR
1 PATCH, TRANSDERMAL 24 HOURS TRANSDERMAL DAILY
Qty: 30 PATCH | Refills: 3 | Status: SHIPPED | OUTPATIENT
Start: 2021-10-04

## 2021-10-03 RX ORDER — GABAPENTIN 300 MG/1
300 CAPSULE ORAL 3 TIMES DAILY
Qty: 42 CAPSULE | Refills: 0 | Status: SHIPPED | OUTPATIENT
Start: 2021-10-03 | End: 2021-10-17

## 2021-10-03 RX ORDER — DICYCLOMINE HCL 20 MG
20 TABLET ORAL EVERY 6 HOURS PRN
Qty: 120 TABLET | Refills: 3 | Status: SHIPPED | OUTPATIENT
Start: 2021-10-03 | End: 2021-10-14 | Stop reason: SDUPTHER

## 2021-10-03 RX ORDER — ATORVASTATIN CALCIUM 40 MG/1
40 TABLET, FILM COATED ORAL NIGHTLY
Qty: 30 TABLET | Refills: 3 | Status: SHIPPED | OUTPATIENT
Start: 2021-10-03

## 2021-10-03 RX ORDER — AMLODIPINE BESYLATE 5 MG/1
5 TABLET ORAL DAILY
Qty: 90 TABLET | Refills: 1 | Status: SHIPPED | OUTPATIENT
Start: 2021-10-03 | End: 2021-10-05 | Stop reason: SDUPTHER

## 2021-10-03 RX ORDER — DIPHENHYDRAMINE HCL 25 MG
25 TABLET ORAL NIGHTLY PRN
Qty: 14 TABLET | Refills: 0 | Status: SHIPPED | OUTPATIENT
Start: 2021-10-03 | End: 2021-11-02

## 2021-10-03 RX ORDER — LANOLIN ALCOHOL/MO/W.PET/CERES
3 CREAM (GRAM) TOPICAL NIGHTLY
Qty: 14 TABLET | Refills: 0 | Status: SHIPPED | OUTPATIENT
Start: 2021-10-03 | End: 2021-10-17

## 2021-10-03 RX ORDER — CLOPIDOGREL BISULFATE 75 MG/1
75 TABLET ORAL DAILY
Qty: 30 TABLET | Refills: 3 | Status: SHIPPED | OUTPATIENT
Start: 2021-10-04 | End: 2021-10-05

## 2021-10-03 RX ORDER — CALCIUM CARBONATE 200(500)MG
500 TABLET,CHEWABLE ORAL 3 TIMES DAILY PRN
Qty: 14 TABLET | Refills: 0 | Status: SHIPPED | OUTPATIENT
Start: 2021-10-03 | End: 2021-11-02

## 2021-10-03 RX ADMIN — Medication 3 MG: at 20:05

## 2021-10-03 RX ADMIN — ENOXAPARIN SODIUM 30 MG: 30 INJECTION SUBCUTANEOUS at 20:06

## 2021-10-03 RX ADMIN — GABAPENTIN 300 MG: 300 CAPSULE ORAL at 09:50

## 2021-10-03 RX ADMIN — VENLAFAXINE HYDROCHLORIDE 75 MG: 75 CAPSULE, EXTENDED RELEASE ORAL at 09:50

## 2021-10-03 RX ADMIN — DICYCLOMINE HYDROCHLORIDE 20 MG: 20 TABLET ORAL at 23:34

## 2021-10-03 RX ADMIN — ONDANSETRON 4 MG: 2 INJECTION INTRAMUSCULAR; INTRAVENOUS at 11:49

## 2021-10-03 RX ADMIN — CLOPIDOGREL BISULFATE 75 MG: 75 TABLET ORAL at 09:50

## 2021-10-03 RX ADMIN — AMLODIPINE BESYLATE 5 MG: 5 TABLET ORAL at 20:06

## 2021-10-03 RX ADMIN — Medication 10 ML: at 09:52

## 2021-10-03 RX ADMIN — GABAPENTIN 300 MG: 300 CAPSULE ORAL at 20:06

## 2021-10-03 RX ADMIN — PANTOPRAZOLE SODIUM 40 MG: 40 TABLET, DELAYED RELEASE ORAL at 09:51

## 2021-10-03 RX ADMIN — DICYCLOMINE HYDROCHLORIDE 20 MG: 20 TABLET ORAL at 12:09

## 2021-10-03 RX ADMIN — Medication 10 ML: at 20:07

## 2021-10-03 RX ADMIN — GABAPENTIN 300 MG: 300 CAPSULE ORAL at 14:48

## 2021-10-03 RX ADMIN — ACETAMINOPHEN 650 MG: 325 TABLET ORAL at 14:42

## 2021-10-03 RX ADMIN — ALPRAZOLAM 0.25 MG: 0.25 TABLET ORAL at 20:06

## 2021-10-03 RX ADMIN — TRAMADOL HYDROCHLORIDE 25 MG: 50 TABLET ORAL at 20:05

## 2021-10-03 RX ADMIN — ALPRAZOLAM 0.25 MG: 0.25 TABLET ORAL at 09:50

## 2021-10-03 ASSESSMENT — PAIN - FUNCTIONAL ASSESSMENT
PAIN_FUNCTIONAL_ASSESSMENT: ACTIVITIES ARE NOT PREVENTED

## 2021-10-03 ASSESSMENT — PAIN SCALES - GENERAL
PAINLEVEL_OUTOF10: 8
PAINLEVEL_OUTOF10: 6
PAINLEVEL_OUTOF10: 0
PAINLEVEL_OUTOF10: 3
PAINLEVEL_OUTOF10: 8

## 2021-10-03 ASSESSMENT — PAIN DESCRIPTION - FREQUENCY
FREQUENCY: INTERMITTENT

## 2021-10-03 ASSESSMENT — PAIN DESCRIPTION - PROGRESSION
CLINICAL_PROGRESSION: NOT CHANGED

## 2021-10-03 ASSESSMENT — PAIN DESCRIPTION - PAIN TYPE
TYPE: ACUTE PAIN

## 2021-10-03 ASSESSMENT — PAIN DESCRIPTION - ONSET
ONSET: ON-GOING

## 2021-10-03 ASSESSMENT — PAIN DESCRIPTION - DESCRIPTORS
DESCRIPTORS: ACHING

## 2021-10-03 ASSESSMENT — PAIN DESCRIPTION - ORIENTATION: ORIENTATION: UPPER;LOWER;MID

## 2021-10-03 ASSESSMENT — PAIN DESCRIPTION - LOCATION
LOCATION: ABDOMEN

## 2021-10-03 NOTE — PROGRESS NOTES
Patient alert and oriented x 4 through out the shift. Patient tolerated breakfast and lunch 100%. Patient c/o abdominal cramping in the evening. MD notified and at bedside; orders adjusted. Small BM reported today. Patient urinated frequently through out the shift. Updates given to patient's sister at bedside.  Electronically signed by Maggie Gorman RN on 10/2/2021 at 8:50 PM

## 2021-10-03 NOTE — PROGRESS NOTES
Clinical Pharmacy Note  Renal Dose Adjustment    Jaret Husain is receiving Lovenox. This medication is renally eliminated. Based on the patient's Estimated Creatinine Clearance: 28 mL/min (A) (based on SCr of 1.4 mg/dL (H)). and urine output, the dose has been adjusted to 30 mg daily per protocol. Pharmacy will continue to monitor and adjust dose as needed for changes in renal function.     Ariane Van [facility],10/3/2021,10:53 AM

## 2021-10-03 NOTE — DISCHARGE INSTR - COC
Admin  {[facility] DME ADLs:[id number]:::0}  Med Delivery   { [facility] MED Delivery:185459228:::0}    Wound Care Documentation and Therapy:        Elimination:  Continence: Bowel: {YES / JW:24148}  Bladder: {YES / FK:68889}  Urinary Catheter: {Urinary Catheter:931105702:::0}   Colostomy/Ileostomy/Ileal Conduit: {YES / VQ:75915}       Date of Last BM: ***    Intake/Output Summary (Last 24 hours) at 10/3/2021 1136  Last data filed at 10/3/2021 1057  Gross per 24 hour   Intake 960 ml   Output --   Net 960 ml     I/O last 3 completed shifts:   In: 1 [P.O.:960; I.V.:10]  Out: -     Safety Concerns:     [employer] Safety Concerns:416585498:::0}    Impairments/Disabilities:      [employer] Impairments/Disabilities:724654302:::0}    Nutrition Therapy:  Current Nutrition Therapy:   [employer] Diet List:417529943:::0}    Routes of Feeding: {[facility] DME Other Feedings:388105856:::0}  Liquids: {Slp liquid thickness:43071}  Daily Fluid Restriction: {[facility] DME Yes amt example:956912412:::0}  Last Modified Barium Swallow with Video (Video Swallowing Test): {Done Not Done NFCO:124805660:::5}    Treatments at the Time of Hospital Discharge:   Respiratory Treatments: ***  Oxygen Therapy:  {Therapy; copd oxygen:07016:::0}  Ventilator:    { CC Vent List:117115257:::0}    Rehab Therapies: {THERAPEUTIC INTERVENTION:1830682891}  Weight Bearing Status/Restrictions: [address] [employer] ([employer] Limited Weight Bearin:::0}  Other Medical Equipment (for information only, NOT a DME order):  {EQUIPMENT:431801481}  Other Treatments: ***    Patient's personal belongings (please select all that are sent with patient):  {[facility] DME Belongings:728803492:::0}    RN SIGNATURE:  {Esignature:156809802:::0}    CASE MANAGEMENT/SOCIAL WORK SECTION    Inpatient Status Date: ***    Readmission Risk Assessment Score:  Readmission Risk              Risk of Unplanned Readmission:  20           Discharging to Facility/ Agency   Name:   Address:  Phone:  Fax:    Dialysis Facility (if applicable) Name:  Address:  Dialysis Schedule:  Phone:  Fax:    / signature: {Esignature:[id number]:::0}    PHYSICIAN SECTION    Prognosis: Fair    Condition at Discharge: Stable    Rehab Potential (if transferring to Rehab): Fair    Recommended Labs or Other Treatments After Discharge: follow up with rehab center    Physician Certification: I certify the above information and transfer of Stefan Row  is necessary for the continuing treatment of the diagnosis listed and that she requires Intermediate/Mental Retardation/Developmental Disabilities Care for greater 30 days.      Update Admission H&P: No change in H&P    PHYSICIAN SIGNATURE:  Electronically signed by Ajit Moore MD on 10/3/21 at 11:36 AM EDT

## 2021-10-03 NOTE — PLAN OF CARE
Problem: Pain:  Goal: Pain level will decrease  Description: Pain level will decrease  10/3/2021 1329 by Seun Haley RN  Outcome: Ongoing  10/3/2021 0329 by Awilda Valentin RN  Outcome: Ongoing  Goal: Control of acute pain  Description: Control of acute pain  10/3/2021 1329 by Seun Haley RN  Outcome: Ongoing  10/3/2021 0329 by Awilda Valentin RN  Outcome: Ongoing  Goal: Control of chronic pain  Description: Control of chronic pain  10/3/2021 1329 by Seun Haley RN  Outcome: Ongoing  10/3/2021 0329 by Awilda Valentin RN  Outcome: Ongoing  Goal: Patient's pain/discomfort is manageable  Description: Patient's pain/discomfort is manageable  10/3/2021 1329 by Seun Haley RN  Outcome: Ongoing  10/3/2021 0329 by Awilda Valentin RN  Outcome: Ongoing     Problem: Skin Integrity:  Goal: Will show no infection signs and symptoms  Description: Will show no infection signs and symptoms  10/3/2021 1329 by Seun Haley RN  Outcome: Ongoing  10/3/2021 0329 by Awilda Valentin RN  Outcome: Ongoing  Goal: Absence of new skin breakdown  Description: Absence of new skin breakdown  10/3/2021 1329 by Seun Haley RN  Outcome: Ongoing  10/3/2021 0329 by Awilda Valentin RN  Outcome: Ongoing     Problem: HEMODYNAMIC STATUS  Goal: Patient has stable vital signs and fluid balance  10/3/2021 1329 by Seun Haley RN  Outcome: Ongoing  10/3/2021 0329 by Awilda Valentin RN  Outcome: Ongoing     Problem: ACTIVITY INTOLERANCE/IMPAIRED MOBILITY  Goal: Mobility/activity is maintained at optimum level for patient  10/3/2021 1329 by eSun Haley RN  Outcome: Ongoing  10/3/2021 0329 by Awilda Valentin RN  Outcome: Ongoing     Problem: COMMUNICATION IMPAIRMENT  Goal: Ability to express needs and understand communication  10/3/2021 1329 by Seun Haley RN  Outcome: Ongoing  10/3/2021 0329 by Awilda Valentin RN  Outcome: Ongoing     Problem: Nutrition  Goal: Optimal nutrition therapy  10/3/2021 1329 by Seun Haley RN  Outcome: Ongoing  10/3/2021 0329 by Pat Monae RN  Outcome: Ongoing     Problem: Falls - Risk of:  Goal: Will remain free from falls  Description: Will remain free from falls  10/3/2021 1329 by Nicholas Patel RN  Outcome: Ongoing  10/3/2021 0329 by Pat Monae RN  Outcome: Ongoing  Goal: Absence of physical injury  Description: Absence of physical injury  10/3/2021 1329 by Nicholas Patel RN  Outcome: Ongoing  10/3/2021 0329 by Pat Monae RN  Outcome: Ongoing     Problem: Infection:  Goal: Will remain free from infection  Description: Will remain free from infection  10/3/2021 1329 by Nicholas Patel RN  Outcome: Ongoing  10/3/2021 0329 by Pat Monae RN  Outcome: Ongoing     Problem: Safety:  Goal: Free from accidental physical injury  Description: Free from accidental physical injury  10/3/2021 1329 by Nicholas Patel RN  Outcome: Ongoing  10/3/2021 0329 by Pat Monae RN  Outcome: Ongoing  Goal: Free from intentional harm  Description: Free from intentional harm  10/3/2021 1329 by Nicholas Patel RN  Outcome: Ongoing  10/3/2021 0329 by Pat Monae RN  Outcome: Ongoing     Problem: Daily Care:  Goal: Daily care needs are met  Description: Daily care needs are met  10/3/2021 1329 by Nicholas Patel RN  Outcome: Ongoing  10/3/2021 0329 by Pat Monae RN  Outcome: Ongoing     Problem:  Activity:  Goal: Ability to tolerate increased activity will improve  Description: Ability to tolerate increased activity will improve  10/3/2021 1329 by Nicholas Patel RN  Outcome: Ongoing  10/3/2021 0329 by Pat Monae RN  Outcome: Ongoing     Problem: Discharge Planning:  Goal: Patients continuum of care needs are met  Description: Patients continuum of care needs are met  10/3/2021 1329 by Nicholas Patel RN  Outcome: Ongoing  10/3/2021 0329 by Pat Monae RN  Outcome: Ongoing

## 2021-10-03 NOTE — PROGRESS NOTES
Hospitalist Progress Note      PCP: No primary care provider on file. Chief Complaint. Presented to hospital for facial droop    Date of Admission: 9/29/2021    Subjective: Cr stable today, denies chest pain, nausea, vomiting, shortness of breath, fever or chills. She has abdominal cramps  Medications:  Reviewed    Infusion Medications    sodium chloride       Scheduled Medications    enoxaparin  30 mg SubCUTAneous Nightly    melatonin  3 mg Oral Nightly    ALPRAZolam  0.25 mg Oral BID    gabapentin  300 mg Oral TID    [Held by provider] lisinopril  40 mg Oral Daily    pantoprazole  40 mg Oral QAM AC    venlafaxine  75 mg Oral Daily    amLODIPine  5 mg Oral Nightly    nicotine  1 patch TransDERmal Daily    atorvastatin  40 mg Oral Nightly    clopidogrel  75 mg Oral Daily    sodium chloride flush  5-40 mL IntraVENous 2 times per day     PRN Meds: acetaminophen, dicyclomine, diphenhydrAMINE, calcium carbonate, gabapentin, traMADol, labetalol, hydrALAZINE, sodium chloride, ondansetron **OR** ondansetron, polyethylene glycol, sodium chloride flush      Intake/Output Summary (Last 24 hours) at 10/3/2021 1830  Last data filed at 10/3/2021 1057  Gross per 24 hour   Intake 480 ml   Output --   Net 480 ml       Physical Exam Performed:    /61   Pulse 73   Temp 98.1 °F (36.7 °C) (Oral)   Resp 18   Ht 5' 1\" (1.549 m)   Wt 112 lb 7 oz (51 kg)   SpO2 98%   BMI 21.24 kg/m²     General appearance: NAD  HEENT:  Conjunctivae/corneas clear  Neck: Supple, with full range of motion. Respiratory:  Normal respiratory effort. Clear to auscultation, bilaterally without Rales/Wheezes/Rhonchi. Cardiovascular: Regular rate and rhythm with normal S1/S2 without murmurs or rubs  Abdomen: Soft, non-tender, non-distended, normal bowel sounds. Musculoskeletal: No cyanosis or edema bilaterally  Neurologic:  without any focal sensory/motor deficits.  grossly non-focal.  Psychiatric: Alert and oriented, Normal (Results Pending)         Assessment/Plan:    Active Hospital Problems    Diagnosis     Severe malnutrition ([facility] [address] [E43]     TIA (transient ischemic attack) [G45.9]        Possible TIA/CVA  - with symptoms: facial droop resolved, MRI brain ordered - negative for CVA  - out of the tPA window  - on Plavix, statin  - MRI brain negative for cva  - consulted neurology     JOHN - hold lisinopril, monitor BMP - Cr improving, s/p bolus IVF,     Heroin withdrawal  - hx of daily heroin use, last used Friday 9/24 (sister trying to help pt detox) - CM consulted  - seen at OSH Saturday for withdrawal sx rx clonidine and antiemetics  DVT Prophylaxis: lovenox  Diet: ADULT DIET;  Regular  Adult Oral Nutrition Supplement; Standard High Calorie/High Protein Oral Supplement  Code Status: Full Code    PT/OT Eval Status: ordered    Dispo - tomorrow, patient refused to be discharged today    Kamla Mccord MD

## 2021-10-03 NOTE — PLAN OF CARE
Problem: Pain:  Goal: Pain level will decrease  Description: Pain level will decrease  Outcome: Ongoing  Goal: Control of acute pain  Description: Control of acute pain  Outcome: Ongoing  Goal: Control of chronic pain  Description: Control of chronic pain  Outcome: Ongoing  Goal: Patient's pain/discomfort is manageable  Description: Patient's pain/discomfort is manageable  Outcome: Ongoing     Problem: Skin Integrity:  Goal: Will show no infection signs and symptoms  Description: Will show no infection signs and symptoms  Outcome: Ongoing  Goal: Absence of new skin breakdown  Description: Absence of new skin breakdown  Outcome: Ongoing     Problem: HEMODYNAMIC STATUS  Goal: Patient has stable vital signs and fluid balance  Outcome: Ongoing     Problem: ACTIVITY INTOLERANCE/IMPAIRED MOBILITY  Goal: Mobility/activity is maintained at optimum level for patient  Outcome: Ongoing     Problem: COMMUNICATION IMPAIRMENT  Goal: Ability to express needs and understand communication  Outcome: Ongoing     Problem: Nutrition  Goal: Optimal nutrition therapy  Outcome: Ongoing     Problem: Falls - Risk of:  Goal: Will remain free from falls  Description: Will remain free from falls  Outcome: Ongoing  Goal: Absence of physical injury  Description: Absence of physical injury  Outcome: Ongoing     Problem: Infection:  Goal: Will remain free from infection  Description: Will remain free from infection  Outcome: Ongoing     Problem: Safety:  Goal: Free from accidental physical injury  Description: Free from accidental physical injury  Outcome: Ongoing  Goal: Free from intentional harm  Description: Free from intentional harm  Outcome: Ongoing     Problem: Daily Care:  Goal: Daily care needs are met  Description: Daily care needs are met  Outcome: Ongoing     Problem: Skin Integrity:  Goal: Skin integrity will stabilize  Description: Skin integrity will stabilize  Outcome: Ongoing     Problem: Discharge Planning:  Goal: Patients continuum of care needs are met  Description: Patients continuum of care needs are met  Outcome: Ongoing     Problem:  Activity:  Goal: Ability to tolerate increased activity will improve  Description: Ability to tolerate increased activity will improve  Outcome: Ongoing

## 2021-10-03 NOTE — CARE COORDINATION
10/03/21 1403   IMM Letter   IMM Letter given to Patient/Family/Significant other/Guardian/POA/by: Alicia Prado RN   IMM Letter date given: 10/03/21   IMM Letter time given: [address] RN, BSN,   [phone]  Electronically signed by Alicia Prado RN on 10/3/2021 at 2:04 PM

## 2021-10-04 LAB
ANION GAP SERPL CALCULATED.3IONS-SCNC: 13 MMOL/L (ref 3–16)
BASOPHILS ABSOLUTE: 0 K/UL (ref 0–0.2)
BASOPHILS RELATIVE PERCENT: 0.8 %
BUN BLDV-MCNC: 42 MG/DL (ref 7–20)
C4 COMPLEMENT: 32.1 MG/DL (ref 10–40)
CALCIUM SERPL-MCNC: 9.5 MG/DL (ref 8.3–10.6)
CHLORIDE BLD-SCNC: 100 MMOL/L (ref 99–110)
CO2: 23 MMOL/L (ref 21–32)
CREAT SERPL-MCNC: 1.2 MG/DL (ref 0.6–1.2)
EOSINOPHILS ABSOLUTE: 0.3 K/UL (ref 0–0.6)
EOSINOPHILS RELATIVE PERCENT: 5.5 %
GFR AFRICAN AMERICAN: 53
GFR NON-AFRICAN AMERICAN: 44
GLUCOSE BLD-MCNC: 119 MG/DL (ref 70–99)
HCT VFR BLD CALC: 35.2 % (ref 36–48)
HEMOGLOBIN: 11.6 G/DL (ref 12–16)
LYMPHOCYTES ABSOLUTE: 2 K/UL (ref 1–5.1)
LYMPHOCYTES RELATIVE PERCENT: 37.9 %
MCH RBC QN AUTO: 31.6 PG (ref 26–34)
MCHC RBC AUTO-ENTMCNC: 32.9 G/DL (ref 31–36)
MCV RBC AUTO: 95.8 FL (ref 80–100)
MONOCYTES ABSOLUTE: 0.4 K/UL (ref 0–1.3)
MONOCYTES RELATIVE PERCENT: 7.4 %
NEUTROPHILS ABSOLUTE: 2.5 K/UL (ref 1.7–7.7)
NEUTROPHILS RELATIVE PERCENT: 48.4 %
PDW BLD-RTO: 13.4 % (ref 12.4–15.4)
PLATELET # BLD: 284 K/UL (ref 135–450)
PMV BLD AUTO: 8 FL (ref 5–10.5)
POTASSIUM SERPL-SCNC: 4.5 MMOL/L (ref 3.5–5.1)
RBC # BLD: 3.68 M/UL (ref 4–5.2)
SODIUM BLD-SCNC: 136 MMOL/L (ref 136–145)
WBC # BLD: 5.2 K/UL (ref 4–11)

## 2021-10-04 PROCEDURE — 6370000000 HC RX 637 (ALT 250 FOR IP): Performed by: INTERNAL MEDICINE

## 2021-10-04 PROCEDURE — 6360000002 HC RX W HCPCS: Performed by: STUDENT IN AN ORGANIZED HEALTH CARE EDUCATION/TRAINING PROGRAM

## 2021-10-04 PROCEDURE — 86160 COMPLEMENT ANTIGEN: CPT

## 2021-10-04 PROCEDURE — 2060000000 HC ICU INTERMEDIATE R&B

## 2021-10-04 PROCEDURE — 93970 EXTREMITY STUDY: CPT

## 2021-10-04 PROCEDURE — 97530 THERAPEUTIC ACTIVITIES: CPT

## 2021-10-04 PROCEDURE — 80048 BASIC METABOLIC PNL TOTAL CA: CPT

## 2021-10-04 PROCEDURE — 36415 COLL VENOUS BLD VENIPUNCTURE: CPT

## 2021-10-04 PROCEDURE — 94760 N-INVAS EAR/PLS OXIMETRY 1: CPT

## 2021-10-04 PROCEDURE — 85025 COMPLETE CBC W/AUTO DIFF WBC: CPT

## 2021-10-04 PROCEDURE — 2580000003 HC RX 258: Performed by: STUDENT IN AN ORGANIZED HEALTH CARE EDUCATION/TRAINING PROGRAM

## 2021-10-04 PROCEDURE — 97535 SELF CARE MNGMENT TRAINING: CPT

## 2021-10-04 PROCEDURE — 6370000000 HC RX 637 (ALT 250 FOR IP): Performed by: NURSE PRACTITIONER

## 2021-10-04 PROCEDURE — 86038 ANTINUCLEAR ANTIBODIES: CPT

## 2021-10-04 RX ADMIN — AMLODIPINE BESYLATE 5 MG: 5 TABLET ORAL at 21:29

## 2021-10-04 RX ADMIN — ALPRAZOLAM 0.25 MG: 0.25 TABLET ORAL at 21:29

## 2021-10-04 RX ADMIN — MAGNESIUM HYDROXIDE/ALUMINUM HYDROXICE/SIMETHICONE: 120; 1200; 1200 SUSPENSION ORAL at 12:19

## 2021-10-04 RX ADMIN — ALPRAZOLAM 0.25 MG: 0.25 TABLET ORAL at 09:15

## 2021-10-04 RX ADMIN — ENOXAPARIN SODIUM 30 MG: 30 INJECTION SUBCUTANEOUS at 21:29

## 2021-10-04 RX ADMIN — GABAPENTIN 300 MG: 300 CAPSULE ORAL at 21:29

## 2021-10-04 RX ADMIN — TRAMADOL HYDROCHLORIDE 25 MG: 50 TABLET ORAL at 11:00

## 2021-10-04 RX ADMIN — Medication 10 ML: at 21:29

## 2021-10-04 RX ADMIN — GABAPENTIN 300 MG: 300 CAPSULE ORAL at 09:15

## 2021-10-04 RX ADMIN — PANTOPRAZOLE SODIUM 40 MG: 40 TABLET, DELAYED RELEASE ORAL at 05:20

## 2021-10-04 RX ADMIN — GABAPENTIN 300 MG: 300 CAPSULE ORAL at 15:05

## 2021-10-04 RX ADMIN — Medication 10 ML: at 09:17

## 2021-10-04 RX ADMIN — DICYCLOMINE HYDROCHLORIDE 20 MG: 20 TABLET ORAL at 11:00

## 2021-10-04 RX ADMIN — Medication 3 MG: at 21:29

## 2021-10-04 RX ADMIN — ONDANSETRON 4 MG: 2 INJECTION INTRAMUSCULAR; INTRAVENOUS at 03:59

## 2021-10-04 RX ADMIN — LISINOPRIL 40 MG: 40 TABLET ORAL at 09:15

## 2021-10-04 RX ADMIN — TRAMADOL HYDROCHLORIDE 25 MG: 50 TABLET ORAL at 21:29

## 2021-10-04 ASSESSMENT — PAIN DESCRIPTION - ORIENTATION: ORIENTATION: LOWER;MID;LEFT

## 2021-10-04 ASSESSMENT — PAIN DESCRIPTION - ONSET: ONSET: ON-GOING

## 2021-10-04 ASSESSMENT — PAIN DESCRIPTION - FREQUENCY: FREQUENCY: CONTINUOUS

## 2021-10-04 ASSESSMENT — PAIN DESCRIPTION - PAIN TYPE: TYPE: ACUTE PAIN

## 2021-10-04 ASSESSMENT — PAIN SCALES - WONG BAKER: WONGBAKER_NUMERICALRESPONSE: 0

## 2021-10-04 ASSESSMENT — PAIN - FUNCTIONAL ASSESSMENT: PAIN_FUNCTIONAL_ASSESSMENT: ACTIVITIES ARE NOT PREVENTED

## 2021-10-04 ASSESSMENT — PAIN SCALES - GENERAL
PAINLEVEL_OUTOF10: 7
PAINLEVEL_OUTOF10: 7
PAINLEVEL_OUTOF10: 9

## 2021-10-04 ASSESSMENT — PAIN DESCRIPTION - DIRECTION: RADIATING_TOWARDS: HIPS

## 2021-10-04 ASSESSMENT — PAIN DESCRIPTION - LOCATION: LOCATION: ABDOMEN

## 2021-10-04 ASSESSMENT — PAIN DESCRIPTION - DESCRIPTORS: DESCRIPTORS: ACHING

## 2021-10-04 ASSESSMENT — PAIN DESCRIPTION - PROGRESSION: CLINICAL_PROGRESSION: GRADUALLY WORSENING

## 2021-10-04 NOTE — PLAN OF CARE
Nutrition Problem #1: Severe malnutrition  Intervention: Food and/or Nutrient Delivery: Continue Current Diet, Modify Oral Nutrition Supplement  Nutritional Goals: PO intakes greater than 50% of meals and supplements

## 2021-10-04 NOTE — CONSULTS
GASTROENTEROLOGY INPATIENT CONSULTATION        IDENTIFYING DATA/REASON FOR CONSULTATION   PATIENT:  Lefty Sorto  MRN:  [id number]  ADMIT DATE: 9/29/2021  TIME OF EVALUATION: 10/4/2021 2:25 PM  HOSPITAL STAY:   LOS: 5 days     REASON FOR CONSULTATION:  Abdominal pain    HISTORY OF PRESENT ILLNESS   Lefty Sorto is a 69 y.o. female with a PMH of heroin abuse, COPD, HTN, asthma, CVA, seizures and chronic abdominal pain who presented on 9/29/2021 with facial droop. She is from [facility]. She was brought to [place] by her sister to help her get sober from heroin. She last used heroin 10 days ago. During her detox her sister noticed left-sided facial droop. Work-up in the ED noted significant hypertension with SBP in the 200s. She was admitted for further management of her blood pressure and for stroke work-up. CT head wo contrast and MRI of the brain was negative for acute infarct or hemorrhage. CTA neck was not obtained due to contrast allergy. MRA neck showed no significant stenosis. We have been consulted regarding abdominal pain. Patient lethargic at time of visit but able to provide some history. Sister is at bedside and assists with history. Sister reports patient's abdominal pain is a chronic issue occurring for the past 5 years. She states her abdominal pain was previously managed with narcotics but once this was stopped  patient turned to heroin to control her pain. Patient states pain is \"all over\" she has associated nausea and vomits frequently. Her appetite is very poor. Patient states eating makes the pain worse. She has been losing quite a bit of weight but unable to quantify. Her bowel pattern fluctuates from constipation to diarrhea. She had 1 bowel movement prior to admission that was very dark. Her bowel movements since have been brown, nonmelenic, nonbloody. She has been seen by gastroenterology in [place] for her chronic abdominal pain.   Patient reports she had a prior EGD and colonoscopy approximately 5 years ago with [facility]. According to care everywhere she had a CT A/P without contrast in May that was notable for severe atherosclerotic disease. No other acute abdominal or pelvic process identified. She had a Doppler ultrasound of the aorta and mesenteric vasculature in March 2021 that showed high-grade stenosis at the superior mesenteric artery origin and increased peak systolic velocity at the inferior mesenteric artery possibly representing high-grade stenosis. 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 She has seen vascular surgery but it is unclear if she has had good follow-up compliance. PAST MEDICAL, SURGICAL, FAMILY, and SOCIAL HISTORY     Past Medical History:   Diagnosis Date    Asthma     Cerebral artery occlusion with cerebral infarction ([facility] [address]     COPD (chronic obstructive pulmonary disease) ([facility] [address]     Hypertension     Seizures ([facility] [address]      History reviewed. No pertinent surgical history. History reviewed. No pertinent family history.   Social History     Socioeconomic History    Marital status: Single     Spouse name: None    Number of children: None    Years of education: None    Highest education level: None   Occupational History    None   Tobacco Use    Smoking status: Current Every Day Smoker     Packs/day: 0.50    Smokeless tobacco: Current User   Substance and Sexual Activity    Alcohol use: Not Currently    Drug use: Yes     Types: Marijuana     Comment: heroin  snorts daily last used 5 days ago    Sexual activity: Not Currently   Other Topics Concern    None   Social History Narrative    None     Social Determinants of Health     Financial Resource Strain:     Difficulty of Paying Living Expenses:    Food Insecurity:     Worried About Running Out of Food in the Last Year:     [address] in the Last Year:    Transportation Needs:     Lack of Transportation (Medical):      Lack of Transportation (Non-Medical):    Physical Activity:     Days of Exercise per Week:     Minutes of Exercise per Session:    Stress:     Feeling of Stress :    Social Connections:     Frequency of Communication with Friends and Family:     Frequency of Social Gatherings with Friends and Family:     Attends Spiritism Services:     Active Member of Clubs or Organizations:     Attends Club or Organization Meetings:     Marital Status:    Intimate Partner Violence:     Fear of Current or Ex-Partner:     Emotionally Abused:     Physically Abused:     Sexually Abused:        MEDICATIONS   SCHEDULED:  enoxaparin, 30 mg, Nightly  melatonin, 3 mg, Nightly  ALPRAZolam, 0.25 mg, BID  gabapentin, 300 mg, TID  lisinopril, 40 mg, Daily  pantoprazole, 40 mg, QAM AC  venlafaxine, 75 mg, Daily  amLODIPine, 5 mg, Nightly  nicotine, 1 patch, Daily  atorvastatin, 40 mg, Nightly  clopidogrel, 75 mg, Daily  sodium chloride flush, 5-40 mL, 2 times per day      FLUIDS/DRIPS:     sodium chloride       PRNs: acetaminophen, 650 mg, Q6H PRN  dicyclomine, 20 mg, Q6H PRN  diphenhydrAMINE, 25 mg, Nightly PRN  calcium carbonate, 500 mg, TID PRN  gabapentin, 300 mg, Q8H PRN  traMADol, 25 mg, Q6H PRN  labetalol, 10 mg, Q4H PRN  hydrALAZINE, 10 mg, Q6H PRN  sodium chloride, 25 mL, PRN  ondansetron, 4 mg, Q8H PRN   Or  ondansetron, 4 mg, Q6H PRN  polyethylene glycol, 17 g, Daily PRN  sodium chloride flush, 5-40 mL, PRN      ALLERGIES:  She   Allergies   Allergen Reactions    Iodine     Nsaids     Sulfa Antibiotics        REVIEW OF SYSTEMS   Pertinent ROS noted in HPI    PHYSICAL EXAM     Vitals:    10/04/21 0400 10/04/21 0802 10/04/21 1053 10/04/21 1115   BP: (!) 167/75 (!) 178/80  (!) 162/70   Pulse: 90 93 86   Resp: 20 14  15   Temp: 98.4 °F (36.9 °C) 98.5 °F (36.9 °C)  98 °F (36.7 °C)   TempSrc: Oral Oral  Oral   SpO2: 97% 99% 98% 96%   Weight: 111 lb 12.4 oz (50.7 kg)      Height:           I/O last 3 completed shifts: In: 1560 [P.O.:1560]  Out: -       Physical Exam:  General appearance: drowsy, NAD  Eyes: Anicteric  Head: Normocephalic, without obvious abnormality  Lungs: clear to auscultation bilaterally, Normal Effort  Heart: regular rate and rhythm, normal S1 and S2, no murmurs or rubs  Abdomen: soft, non-distended, diffusely tender without rebound or guarding  Extremities: atraumatic, no cyanosis or edema  Skin: warm and dry, no jaundice  Neuro: Grossly intact, A&OX3      LABS AND IMAGING   Laboratory   Recent Labs     10/02/21  0454 10/03/21  0530 10/04/21  0513   WBC 8.1 7.5 5.2   HGB 12.1 11.3* 11.6*   HCT 36.8 33.9* 35.2*   MCV 94.8 94.9 95.8    262 284     Recent Labs     10/02/21  0454 10/03/21  0530 10/04/21  0513   * 134* 136   K 3.9 3.9 4.5   CL 96* 99 100   CO2 23 22 23   BUN 37* 43* 42*   CREATININE 1.6* 1.4* 1.2     No results for input(s): AST, ALT, ALB, BILIDIR, BILITOT, ALKPHOS in the last 72 hours. No results for input(s): LIPASE, AMYLASE in the last 72 hours. No results for input(s): PROTIME, INR in the last 72 hours. Imaging  MRA NECK WO CONTRAST   Final Result   Normal contrast-enhanced MRA of the neck. No evidence of significant   stenosis. MRI BRAIN WO CONTRAST   Final Result   1. No evidence of acute infarct, hemorrhage, mass effect/midline shift, or   ventriculomegaly. 2. Moderate microvascular ischemic disease with a chronic left basal ganglia   lacunar infarct. 3. Focal flow-void in the anterior communicating artery region likely related   to a saccular aneurysm. Please refer to 09/30/2021 MRA head. XR ABDOMEN (KUB) (SINGLE AP VIEW)   Final Result   No significant finding in the abdomen.          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 Otherwise, no acute abnormality or flow-limiting stenosis in the major   arteries of the head. XR CHEST 1 VIEW   Final Result   No acute disease         CT HEAD WO CONTRAST   Final Result   No acute intracranial abnormality. Chronic small-vessel white matter ischemic changes. Left basal ganglia   lacunar infarct. Findings were discussed with Mirela Yoo at 12:21 pm on   9/29/2021. VL Extremity Venous Bilateral    (Results Pending)   VL Extremity Venous Bilateral    (Results Pending)         ASSESSMENT AND RECOMMENDATIONS   69 y.o. female with a PMH of PMH of heroin abuse, COPD, HTN, asthma, CVA, seizures and chronic abdominal pain who presented on 9/29/2021 with left sided facial droop. Work-up in the ED noted significant hypertension with SBP in the 200s. She was admitted for further management of her blood pressure and for stroke work-up. CT head wo contrast and MRI of the brain was negative for acute infarct or hemorrhage. CTA neck was not obtained due to contrast allergy. MRA neck showed no significant stenosis. We have been consulted regarding abdominal pain. IMPRESSION:  1. Chronic abdominal pain with history of chronic mesenteric ischemia  -pain worse with eating.  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 -Most recent CT A/P without contrast 5/2021 showed no acute intra abdominal or pelvic abnormalities. -LFTs and lipase normal  -pt reports prior EGD/colonoscopy ~5 years ago with [facility] in [place]. Will request records. RECOMMENDATIONS:    Discussed case with Dr. Lazarus Higgins. Will obtain doppler US. Will also send off additional blood work to evaluate for rare causes of chronic abdominal pain (urine PBG, C1 esterase, c4 complements, ALYSSA). Will request outside records of her prior endoscopies. Pending US results consider Vasc Surgery consult. If you have any questions or need any further information, please feel free to contact our consult team.  Thank you for allowing us to participate in the care of Anitha Cowan. The note was completed using Dragon voice recognition transcription. Every effort was made to ensure accuracy; however, inadvertent transcription errors may be present despite my best efforts to edit errors. Alicia QUEVEDO-    Attending physician addendum:      I have personally seen and examined the patient, reviewed the patient's medical record and pertinent labs and clinical imaging. I have personally staffed the case with EMY Vargas Se. I agree with her consultation note, exam findings, assessment and plans  as written above. I have made appropriate modifications and edited her assessment and plan where needed to reflect my impression and plans for this patient. Patient with IV heroin abuse and recent cessation. Patient in withdrawal and had severe HTN with facial droop. Admitted for management of her withdrawal and HTN. MRI and CT negative for CVA. Patient with chronic abdominal pain x 5 years. CT in 5/2021 negative. Patient has had extensive evaluation by GI in [place] including EGD and Colonoscopy 5 years ago. Doppler in March 2021 that showed high-grade stenosis at the superior mesenteric artery origin and increased peak systolic velocity at the inferior mesenteric artery possibly representing high-grade stenosis.   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 She has seen vascular surgery but it is unclear if she has had good follow-up compliance. ? Mesenteric ischemia. She has a severe contrast allergy so we cannot administer IV contrast for the patient. IV heroin withdrawal   HTN emergency  Hx of facial droop   Opioid dependence  Chronic abdominal pain with weight loss- ? Mesenteric ischemia    Plan:  Repeat Doppler study  Vascular surgery consultation  Supportive care for now  Will follow. Thank you for allowing me to participate in this patient's care. If there are any questions or concerns regarding this patient, or the plan we have set in place, please feel free to contact me at [phone].      Ezekiel Smith, DO

## 2021-10-04 NOTE — PROGRESS NOTES
Speech Language Pathology    Dysphagia tx attempted. Patient declines PO trials at this time 2/2 PO reported to result in abdominal pain. Patient agreeable to SLP re-attempt at a later date unless otherwise notified. Thank you. Aiden Morocho, #[id number]  Speech-Language Pathologist  Portable phone: [phone]

## 2021-10-04 NOTE — PROGRESS NOTES
Comprehensive Nutrition Assessment    Type and Reason for Visit:  Reassess    Nutrition Recommendations/Plan:   Discontinue Ensure Enlive per pt request  Add Apple Ensure Clear tid to start  Monitor intake status for possible need to start alternate nutrition    Nutrition Assessment:  Follow-up. Pt was tolerating regular diet with intakes at %, but reported this date that pain was too intense to eat. Feedback revealed that pt is not tolerating the Ensure Enlive. Agreed to adding Apple Ensure Clear tid to start. Noted GI on board evaluating abdominal pain which is worse with po. Will monitor for improved po vs need for alternate nutrition. Malnutrition Assessment:  Malnutrition Status:  Severe malnutrition    Context:  Chronic Illness     Findings of the 6 clinical characteristics of malnutrition:  Energy Intake:  Mild decrease in energy intake (Comment) (Pt report on EMR poor intake prior to admission)  Weight Loss:  7 - Greater than 10% over 6 months     Body Fat Loss:  7 - Severe body fat loss Orbital, Buccal region   Muscle Mass Loss:  7 - Severe muscle mass loss Temples (temporalis)  Fluid Accumulation:  No significant fluid accumulation     Strength:  Not Performed    Estimated Daily Nutrient Needs:  Energy (kcal):  4538-2916 kcal (25-30 kcal/kg CBW 49 kg); Weight Used for Energy Requirements:  Current     Protein (g):  49-59 gm (1.0-1.2 g/kg CBW 49 kg); Weight Used for Protein Requirements:  Current        Fluid (ml/day):   ; Method Used for Fluid Requirements:  1 ml/kcal      Nutrition Related Findings:  Noted trace edema to BLE. Noted no BM since 9/29      Wounds:  None       Current Nutrition Therapies:    ADULT DIET;  Regular  Adult Oral Nutrition Supplement; Standard High Calorie/High Protein Oral Supplement    Anthropometric Measures:  · Height: 5' 1\" (154.9 cm)  · Current Body Weight: 112 lb (50.8 kg)   · Admission Body Weight: 108 lb (49 kg)    · Usual Body Weight: 128 lb (58.1 kg) (4/10/21 per care everywhere)     · Ideal Body Weight: 105 lbs; % Ideal Body Weight 106.7 %   · BMI: 21.2  · Adjusted Body Weight:  ; No Adjustment   · BMI Categories: Underweight (BMI less than 22) age over 72       Nutrition Diagnosis:   · Severe malnutrition related to inadequate protein-energy intake as evidenced by severe loss of subcutaneous fat, severe muscle loss, poor intake prior to admission, weight loss greater than or equal to 10% in 6 months      Nutrition Interventions:   Food and/or Nutrient Delivery:  Continue Current Diet, Modify Oral Nutrition Supplement  Nutrition Education/Counseling:  No recommendation at this time   Coordination of Nutrition Care:  Continue to monitor while inpatient    Goals:  PO intakes greater than 50% of meals and supplements       Nutrition Monitoring and Evaluation:   Behavioral-Environmental Outcomes:  None Identified   Food/Nutrient Intake Outcomes:  Food and Nutrient Intake, Supplement Intake  Physical Signs/Symptoms Outcomes:  Biochemical Data, GI Status, Weight, Skin, Fluid Status or Edema     Discharge Planning:     Too soon to determine     Electronically signed by Migel Burnett RD, LD on 10/4/21 at 4:30 PM EDT    Contact: [phone]

## 2021-10-04 NOTE — CARE COORDINATION
Discharge Planning:   Met with patient and sister, Tali Montilla, at bedside. Discussed addiction [facility] options. [facility] Patient reports having received Subutex in the past. Discussed [facility] outpatient treatment. Patient requested referral to [facility]. Patient reports they need to return to [place] to [place] care of some things. \" Patient and sister spoke at length with Sw regarding plan. Sister reports recent death in the family and an upcoming .   Patient reports wanting substance use treatment. Patient reports no other needs. Notified Lawanda with [facility] of referral.     SW/CM provided contact information for patient or family to call with any questions. SW/CM will follow and assist as needed.     ZANA Coronado, [place], Social Work  [phone]  Electronically signed by ZANA Coronado, W on 10/4/2021 at 12:33 PM

## 2021-10-04 NOTE — PROGRESS NOTES
Hospitalist Progress Note      PCP: No primary care provider on file. Date of Admission: 9/29/2021        Subjective: Feels okay, having some epigastric discomfort, no weakness nausea or vomiting. No family member at bedside      Medications:  Reviewed    Infusion Medications    sodium chloride       Scheduled Medications    enoxaparin  30 mg SubCUTAneous Nightly    melatonin  3 mg Oral Nightly    ALPRAZolam  0.25 mg Oral BID    gabapentin  300 mg Oral TID    lisinopril  40 mg Oral Daily    pantoprazole  40 mg Oral QAM AC    venlafaxine  75 mg Oral Daily    amLODIPine  5 mg Oral Nightly    nicotine  1 patch TransDERmal Daily    atorvastatin  40 mg Oral Nightly    clopidogrel  75 mg Oral Daily    sodium chloride flush  5-40 mL IntraVENous 2 times per day     PRN Meds: acetaminophen, dicyclomine, diphenhydrAMINE, calcium carbonate, gabapentin, traMADol, labetalol, hydrALAZINE, sodium chloride, ondansetron **OR** ondansetron, polyethylene glycol, sodium chloride flush      Intake/Output Summary (Last 24 hours) at 10/4/2021 7653  Last data filed at 10/3/2021 1802  Gross per 24 hour   Intake 1560 ml   Output --   Net 1560 ml       Physical Exam Performed:    BP (!) 167/75   Pulse 90   Temp 98.4 °F (36.9 °C) (Oral)   Resp 20   Ht 5' 1\" (1.549 m)   Wt 111 lb 12.4 oz (50.7 kg)   SpO2 97%   BMI 21.12 kg/m²     General appearance: No apparent distress  Neck: Supple  Respiratory:  Normal respiratory effort. Clear to auscultation, bilaterally without Rales/Wheezes/Rhonchi. Cardiovascular: Regular rate and rhythm with normal S1/S2 without murmurs, rubs or gallops. Abdomen: Soft, non-tender, non-distended  Musculoskeletal: No clubbing, cyanosis  Skin: Skin color, texture, turgor normal.  No rashes or lesions.   Neurologic: No focal  Psychiatric: Alert   Capillary Refill: Brisk,3 seconds, normal   Peripheral Pulses: +2 palpable, equal bilaterally       Labs:   Recent Labs     10/02/21  4008 10/03/21  0530 10/04/21  0513   WBC 8.1 7.5 5.2   HGB 12.1 11.3* 11.6*   HCT 36.8 33.9* 35.2*    262 284     Recent Labs     10/02/21  0454 10/03/21  0530 10/04/21  0513   * 134* 136   K 3.9 3.9 4.5   CL 96* 99 100   CO2 23 22 23   BUN 37* 43* 42*   CREATININE 1.6* 1.4* 1.2   CALCIUM 9.3 9.3 9.5     No results for input(s): AST, ALT, BILIDIR, BILITOT, ALKPHOS in the last 72 hours. No results for input(s): INR in the last 72 hours. No results for input(s): Anna Zabrina in the last 72 hours. Urinalysis:    No results found for: Alexsandra Learn, BACTERIA, RBCUA, BLOODU, [address], [address]    Radiology:  MRA NECK WO CONTRAST   Final Result   Normal contrast-enhanced MRA of the neck. No evidence of significant   stenosis. MRI BRAIN WO CONTRAST   Final Result   1. No evidence of acute infarct, hemorrhage, mass effect/midline shift, or   ventriculomegaly. 2. Moderate microvascular ischemic disease with a chronic left basal ganglia   lacunar infarct. 3. Focal flow-void in the anterior communicating artery region likely related   to a saccular aneurysm. Please refer to 09/30/2021 MRA head. XR ABDOMEN (KUB) (SINGLE AP VIEW)   Final Result   No significant finding in the abdomen. 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 Otherwise, no acute abnormality or flow-limiting stenosis in the major   arteries of the head. XR CHEST 1 VIEW   Final Result   No acute disease         CT HEAD WO CONTRAST   Final Result   No acute intracranial abnormality. Chronic small-vessel white matter ischemic changes. Left basal ganglia   lacunar infarct. Findings were discussed with Janna Galvan at 12:21 pm on   9/29/2021.          VL Extremity Venous Bilateral    (Results Pending)   VL Extremity Venous Bilateral    (Results Pending)           Assessment/Plan:    Active Hospital Problems    Diagnosis     Severe malnutrition ([facility] [address] [E43]     TIA (transient ischemic attack) [G45.9]      1. Possible TIA, MRI negative for acute changes, neurology has been consulted, on Plavix, statin, continued on telemetry for now  2. Saccular aneurysm, 4 mm, neurosurgery consulted, no immediate recommendations for intervention, follow-up with neurosurgery as outpatient  3. Acute renal failure, improving  4. Essential hypertension, uncontrolled, restarting lisinopril as kidney function improving, monitor blood pressure for now  5. PFO, follow-up with cardiology as outpatient, ultrasound lower extremity pending  6. Heroin abuse, counseled    DVT Prophylaxis: Lovenox  Diet: ADULT DIET;  Regular  Adult Oral Nutrition Supplement; Standard High Calorie/High Protein Oral Supplement  Code Status: Full Code    PT/OT Eval Status: Ordered        Korina Rocha MD

## 2021-10-04 NOTE — PROGRESS NOTES
Occupational Therapy  Facility/Department: [address] PROGRESSIVE CARE  Daily Treatment Note  This note to serve as discharge summary if pt d/c'd prior to next session    NAME: Jessica Shin  : 1950  MRN: [id number]    Date of Service: 10/4/2021    Discharge Recommendations:  24 hour supervision or assist       Assessment   Performance deficits / Impairments: Decreased functional mobility ; Decreased strength;Decreased endurance;Decreased ADL status; Decreased high-level IADLs;Decreased balance  Assessment: Discussed with OTR: Pt tolerated session fairly well, limited by pain, decreased activity tolerance/endurance. Pt completing sit><stand with SBA, yet cued for safety with walker use. Pt amb with light CG/SBA and cued for safe walker use. Pt does not follow safey cues. Pt needing up to [address] for ADL's (clothing management for toileting and anticipate would require overall Min A for bathing/dressing tasks). Anticipate pt will be safe to return home with prior level of assist.  Prognosis: Good  History: Jessica Shin is a 69 y.o. female who presents to the emergency department with concern for left facial droop and heroin withdrawl. History was obtained mostly from the sister of the patient. The patient's sister states that the patient has been abusing heroin and she lives in [place]. PTA pt from home with assist from sister where pt needed assist for mobility and ADLs. Pt currently requires CGA and use of RW for mobility and transfers. OT Education: OT Role;Plan of Care;Transfer Training  REQUIRES OT FOLLOW UP: Yes  Activity Tolerance  Activity Tolerance: Patient limited by fatigue;Patient limited by pain  Activity Tolerance: abdominal and headache pain  Safety Devices  Safety Devices in place: Yes  Type of devices: Call light within reach (RN in room at end of tx; Pt sitting at EOB)         Patient Diagnosis(es): The primary encounter diagnosis was TIA (transient ischemic attack).  Diagnoses of Heroin withdrawal ([address] and Hypomagnesemia were also pertinent to this visit. has a past medical history of Asthma, Cerebral artery occlusion with cerebral infarction ([address] COPD (chronic obstructive pulmonary disease) ([address] Hypertension, and Seizures ([facility] [address] has no past surgical history on file. Restrictions  Restrictions/Precautions  Restrictions/Precautions: Contact Precautions  Position Activity Restriction  Other position/activity restrictions: C-diff R/O  Subjective   General  Chart Reviewed: Yes, Progress Notes  Patient assessed for rehabilitation services?: Yes  Additional Pertinent Hx: per ED note, Rubi Nassar is a 69 y.o. female who presents to the emergency department with concern for left facial droop and heroin withdrawal.  This patient has a past medical history of heroin abuse. She last used on Friday. This weekend she was seen at an outside hospital and started on clonidine and some other medications for heroin withdrawal.  History was obtained mostly from the sister of the patient. The patient's sister states that the patient has been abusing heroin and she lives in [place]. She recently brought her down to [address] [place] [address] [employer] in an effort to be of help to get her off heroin. She last used on Friday. Saturday started having symptoms of withdrawal she was seen in outside hospital and started on antiemetic and clonidine. This morning she last was normal at 10:30 AM.  When she came to the ED today she noticed that she had a little left facial droop. She was put into the room and will be checked by pulling out her mask we did not see a facial droop. Patient does complain of a headache since last evening. It was gradual in onset and worsened throughout the night and this morning. Patient has been having associated nausea vomiting and diarrhea from the heroin withdrawal.  She also complains of chronic right upper quadrant pain for the past year. Family / Caregiver Present:  Yes (sister)  Referring Practitioner: Kaylene Cervantes MD  Subjective  Subjective: Pt met BS, sitting at EOB, leaning onto her L elbow. Sister present, requesting to speak with RN. Pt c/o abdominal pain and headache (nursing made aware). General Comment  Comments: okay for therapy per RN. Orientation  Orientation  Overall Orientation Status: Within Functional Limits  Objective    ADL  Toileting: Unable to assess(comment) (no void, pt c/o constipation (RN made aware). Pt required Min A for clothing management)  Additional Comments: Anticipate pt needing up to [place] for ADLs based on ROM, strength, and balance        Standing Balance  Activity: light CG/mostly SBA for amb between bed, BR, 1012 ft, with RW, and leaning forward onto L elbow, to holding middle of walker w/both hands  Toilet Transfers  Toilet - Technique: Ambulating  Equipment Used: Grab bars  Toilet Transfer: Stand by assistance  Toilet Transfers Comments: RW, cues for hand placement  Wheelchair Bed Transfers  Wheelchair/Bed - Technique: Ambulating  Equipment Used: Bed  Level of Asssistance: Stand by assistance  Wheelchair Transfers Comments: RW, cued for hand placement, yet does not follow  Bed mobility  Comment: GUZMAN. Pt sitting at bed at start and at end of tx. Cognition  Overall Cognitive Status: WFL; does need cues for safe walker use.             Plan   Plan  Times per week: 3-5x  Current Treatment Recommendations: Strengthening, Functional Mobility Training, Balance Training, Safety Education & Training, Patient/Caregiver Education & Training, Self-Care / ADL, Endurance Training, Equipment Evaluation, Education, & procurement    AM-PAC Score        AM-PAC Inpatient Daily Activity Raw Score: 19 (10/04/21 1102)  AM-PAC Inpatient ADL T-Scale Score : 40.22 (10/04/21 1102)  ADL Inpatient CMS 0-100% Score: 42.8 (10/04/21 1102)  ADL Inpatient CMS G-Code Modifier : CK (10/04/21 1102)    Goals  Short term goals  Time Frame for Short term goals: prior to D/C.   Status: goals ongoing  Short term goal 1: complete functional mobility and transfers with supervision  Short term goal 2: complete toileting with supervision  Short term goal 3: complete bathing and dressing with Min A  Short term goal 4: complete grooming with setup  Long term goals  Time Frame for Long term goals : STG=LTG  Patient Goals   Patient goals : return to [address] [place] [address] Time   Individual Concurrent Group Co-treatment   Time In 1040         Time Out [address] [facility] AGARWAL/L,515

## 2021-10-05 VITALS
TEMPERATURE: 98.1 F | WEIGHT: 110.67 LBS | OXYGEN SATURATION: 98 % | SYSTOLIC BLOOD PRESSURE: 162 MMHG | HEIGHT: 61 IN | HEART RATE: 80 BPM | RESPIRATION RATE: 18 BRPM | BODY MASS INDEX: 20.89 KG/M2 | DIASTOLIC BLOOD PRESSURE: 78 MMHG

## 2021-10-05 LAB
ANION GAP SERPL CALCULATED.3IONS-SCNC: 10 MMOL/L (ref 3–16)
ANTI-NUCLEAR ANTIBODY (ANA): NEGATIVE
BASOPHILS ABSOLUTE: 0 K/UL (ref 0–0.2)
BASOPHILS RELATIVE PERCENT: 0.8 %
BUN BLDV-MCNC: 28 MG/DL (ref 7–20)
CALCIUM SERPL-MCNC: 9.3 MG/DL (ref 8.3–10.6)
CHLORIDE BLD-SCNC: 98 MMOL/L (ref 99–110)
CO2: 25 MMOL/L (ref 21–32)
CREAT SERPL-MCNC: 0.8 MG/DL (ref 0.6–1.2)
EOSINOPHILS ABSOLUTE: 0.2 K/UL (ref 0–0.6)
EOSINOPHILS RELATIVE PERCENT: 4.4 %
GFR AFRICAN AMERICAN: >60
GFR NON-AFRICAN AMERICAN: >60
GLUCOSE BLD-MCNC: 109 MG/DL (ref 70–99)
HCT VFR BLD CALC: 34.3 % (ref 36–48)
HEMOGLOBIN: 11.3 G/DL (ref 12–16)
LYMPHOCYTES ABSOLUTE: 2.6 K/UL (ref 1–5.1)
LYMPHOCYTES RELATIVE PERCENT: 47.6 %
MCH RBC QN AUTO: 31.5 PG (ref 26–34)
MCHC RBC AUTO-ENTMCNC: 33 G/DL (ref 31–36)
MCV RBC AUTO: 95.4 FL (ref 80–100)
MONOCYTES ABSOLUTE: 0.4 K/UL (ref 0–1.3)
MONOCYTES RELATIVE PERCENT: 6.8 %
NEUTROPHILS ABSOLUTE: 2.2 K/UL (ref 1.7–7.7)
NEUTROPHILS RELATIVE PERCENT: 40.4 %
PDW BLD-RTO: 13.6 % (ref 12.4–15.4)
PLATELET # BLD: 274 K/UL (ref 135–450)
PMV BLD AUTO: 7.9 FL (ref 5–10.5)
POTASSIUM SERPL-SCNC: 4.2 MMOL/L (ref 3.5–5.1)
RBC # BLD: 3.59 M/UL (ref 4–5.2)
SODIUM BLD-SCNC: 133 MMOL/L (ref 136–145)
WBC # BLD: 5.5 K/UL (ref 4–11)

## 2021-10-05 PROCEDURE — 92526 ORAL FUNCTION THERAPY: CPT

## 2021-10-05 PROCEDURE — 2580000003 HC RX 258: Performed by: STUDENT IN AN ORGANIZED HEALTH CARE EDUCATION/TRAINING PROGRAM

## 2021-10-05 PROCEDURE — 36415 COLL VENOUS BLD VENIPUNCTURE: CPT

## 2021-10-05 PROCEDURE — 80048 BASIC METABOLIC PNL TOTAL CA: CPT

## 2021-10-05 PROCEDURE — 94760 N-INVAS EAR/PLS OXIMETRY 1: CPT

## 2021-10-05 PROCEDURE — APPNB45 APP NON BILLABLE 31-45 MINUTES: Performed by: NURSE PRACTITIONER

## 2021-10-05 PROCEDURE — 6370000000 HC RX 637 (ALT 250 FOR IP): Performed by: INTERNAL MEDICINE

## 2021-10-05 PROCEDURE — 93975 VASCULAR STUDY: CPT

## 2021-10-05 PROCEDURE — 85025 COMPLETE CBC W/AUTO DIFF WBC: CPT

## 2021-10-05 PROCEDURE — 6370000000 HC RX 637 (ALT 250 FOR IP): Performed by: NURSE PRACTITIONER

## 2021-10-05 PROCEDURE — 9990000010 HC NO CHARGE VISIT: Performed by: PHYSICAL THERAPIST

## 2021-10-05 PROCEDURE — APPSS45 APP SPLIT SHARED TIME 31-45 MINUTES: Performed by: NURSE PRACTITIONER

## 2021-10-05 RX ORDER — AMLODIPINE BESYLATE 10 MG/1
10 TABLET ORAL DAILY
Qty: 30 TABLET | Refills: 0 | Status: SHIPPED | OUTPATIENT
Start: 2021-10-05 | End: 2021-10-18

## 2021-10-05 RX ORDER — CLOPIDOGREL BISULFATE 75 MG/1
75 TABLET ORAL DAILY
Qty: 30 TABLET | Refills: 0 | Status: SHIPPED | OUTPATIENT
Start: 2021-10-05

## 2021-10-05 RX ORDER — AMLODIPINE BESYLATE 10 MG/1
10 TABLET ORAL NIGHTLY
Status: DISCONTINUED | OUTPATIENT
Start: 2021-10-05 | End: 2021-10-05 | Stop reason: HOSPADM

## 2021-10-05 RX ADMIN — Medication 10 ML: at 08:45

## 2021-10-05 RX ADMIN — LISINOPRIL 40 MG: 40 TABLET ORAL at 08:45

## 2021-10-05 RX ADMIN — ALPRAZOLAM 0.25 MG: 0.25 TABLET ORAL at 08:44

## 2021-10-05 RX ADMIN — TRAMADOL HYDROCHLORIDE 25 MG: 50 TABLET ORAL at 13:40

## 2021-10-05 RX ADMIN — DICYCLOMINE HYDROCHLORIDE 20 MG: 20 TABLET ORAL at 08:45

## 2021-10-05 RX ADMIN — GABAPENTIN 300 MG: 300 CAPSULE ORAL at 08:45

## 2021-10-05 RX ADMIN — MAGNESIUM HYDROXIDE/ALUMINUM HYDROXICE/SIMETHICONE: 120; 1200; 1200 SUSPENSION ORAL at 11:05

## 2021-10-05 ASSESSMENT — PAIN SCALES - GENERAL
PAINLEVEL_OUTOF10: 0
PAINLEVEL_OUTOF10: 8

## 2021-10-05 ASSESSMENT — ENCOUNTER SYMPTOMS
ABDOMINAL PAIN: 1
NAUSEA: 1
SHORTNESS OF BREATH: 0

## 2021-10-05 NOTE — CARE COORDINATION
CASE MANAGEMENT DISCHARGE SUMMARY:    DISCHARGE DATE: 10/5/21    DISCHARGED TO: Home with sister  Discussed PCP. Patient requested [facility]) Physician list. List provided. Family to call to schedule. Assessment with [address] [place] [address] outpatient services today. Confirmed with patient and sister that sister will transport. Notified Lawanda Teran with [address] [place] [address]. TRANSPORTATION: Sister at bedside. TIME: By 2:00 PM     PREFERRED PHARMACY: [address] [place] [address]   Patient and sister aware of $30 cost for prescription; they reported no needs for assistance at this time.      ZANA Haynes, AIDEW, Social Work/Case Management   [phone]  Electronically signed by ZANA Haynes, AIDEW on 10/5/2021 at 12:36 PM

## 2021-10-05 NOTE — PROGRESS NOTES
Hospitalist Progress Note      PCP: No primary care provider on file. Date of Admission: 9/29/2021        Subjective: Still having abdominal pain with some improvement, denies nausea or vomiting at this time no chest pain or shortness of breath nurse at bedside      Medications:  Reviewed    Infusion Medications    sodium chloride       Scheduled Medications    enoxaparin  30 mg SubCUTAneous Nightly    melatonin  3 mg Oral Nightly    ALPRAZolam  0.25 mg Oral BID    gabapentin  300 mg Oral TID    lisinopril  40 mg Oral Daily    pantoprazole  40 mg Oral QAM AC    venlafaxine  75 mg Oral Daily    amLODIPine  5 mg Oral Nightly    nicotine  1 patch TransDERmal Daily    atorvastatin  40 mg Oral Nightly    clopidogrel  75 mg Oral Daily    sodium chloride flush  5-40 mL IntraVENous 2 times per day     PRN Meds: acetaminophen, dicyclomine, diphenhydrAMINE, calcium carbonate, gabapentin, traMADol, labetalol, hydrALAZINE, sodium chloride, ondansetron **OR** ondansetron, polyethylene glycol, sodium chloride flush      Intake/Output Summary (Last 24 hours) at 10/5/2021 0721  Last data filed at 10/5/2021 0707  Gross per 24 hour   Intake 480 ml   Output 1400 ml   Net -920 ml       Physical Exam Performed:    BP (!) 196/74   Pulse 90   Temp 98.3 °F (36.8 °C) (Oral)   Resp 18   Ht 5' 1\" (1.549 m)   Wt 110 lb 10.7 oz (50.2 kg) Comment: Patient refused standing scale  SpO2 97%   BMI 20.91 kg/m²     General appearance: No apparent distress  Neck: Supple  Respiratory:  Normal respiratory effort. Clear to auscultation, bilaterally without Rales/Wheezes/Rhonchi. Cardiovascular: Regular rate and rhythm with normal S1/S2 without murmurs, rubs or gallops. Abdomen: Soft, mild tenderness in epigastric area  Musculoskeletal: No clubbing, cyanosis. Skin: Skin color, texture, turgor normal.  No rashes or lesions.   Neurologic: No focal weakness  Psychiatric: Alert and oriented  Capillary Refill: Brisk,3 seconds, normal   Peripheral Pulses: +2 palpable, equal bilaterally       Labs:   Recent Labs     10/03/21  0530 10/04/21  0513 10/05/21  0543   WBC 7.5 5.2 5.5   HGB 11.3* 11.6* 11.3*   HCT 33.9* 35.2* 34.3*    284 274     Recent Labs     10/03/21  0530 10/04/21  0513 10/05/21  0543   * 136 133*   K 3.9 4.5 4.2   CL 99 100 98*   CO2 22 23 25   BUN 43* 42* 28*   CREATININE 1.4* 1.2 0.8   CALCIUM 9.3 9.5 9.3     No results for input(s): AST, ALT, BILIDIR, BILITOT, ALKPHOS in the last 72 hours. No results for input(s): INR in the last 72 hours. No results for input(s): Kathyrn Belch in the last 72 hours. Urinalysis:    No results found for: Cathlene Shank, BACTERIA, RBCUA, BLOODU, [address], [address]    Radiology:  VL Extremity Venous Bilateral   Final Result      VL Extremity Venous Bilateral   Final Result      MRA NECK WO CONTRAST   Final Result   Normal contrast-enhanced MRA of the neck. No evidence of significant   stenosis. MRI BRAIN WO CONTRAST   Final Result   1. No evidence of acute infarct, hemorrhage, mass effect/midline shift, or   ventriculomegaly. 2. Moderate microvascular ischemic disease with a chronic left basal ganglia   lacunar infarct. 3. Focal flow-void in the anterior communicating artery region likely related   to a saccular aneurysm. Please refer to 09/30/2021 MRA head. XR ABDOMEN (KUB) (SINGLE AP VIEW)   Final Result   No significant finding in the abdomen. 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 Otherwise, no acute abnormality or flow-limiting stenosis in the major   arteries of the head. XR CHEST 1 VIEW   Final Result   No acute disease         CT HEAD WO CONTRAST   Final Result   No acute intracranial abnormality. Chronic small-vessel white matter ischemic changes. Left basal ganglia   lacunar infarct.       Findings were discussed with SHAGUFTA BAUGH at 12:21 pm on   9/29/2021. VL AORTA/IVC/ILIAC/BYPASS GRAFT COMPLETE    (Results Pending)           Assessment/Plan:    Active Hospital Problems    Diagnosis     Severe malnutrition ([facility] [address] [E43]     TIA (transient ischemic attack) [G45.9]      1. Possible TIA, MRI negative for acute changes, neurology has been consulted, on Plavix, statin, continued on telemetry for now, no new symptoms. 2. Saccular aneurysm, 4 mm, neurosurgery consulted, no immediate recommendations for intervention, follow-up with neurosurgery as outpatient  3. Acute renal failure, improving  4. Abdominal pain, GI consulted, patient with history of mesenteric artery stenosis, vascular surgery consulted, will follow recommendations. 5. Essential hypertension, uncontrolled, restarted on lisinopril as kidney function improving, will check her blood pressure manually  6. PFO, follow-up with cardiology as outpatient, ultrasound lower extremity pending  7.  Heroin abuse, counseled      Diet: Diet NPO  Code Status: Full Code        Lenore Lee MD

## 2021-10-05 NOTE — PROGRESS NOTES
Physical Therapy  Dwain Klein  [id number]  [id number]    Attempted to see for PT session however pt out of room to vascular; will attempt later as schedule permits  Electronically signed by BERNICE GALVAN PT on 10/5/2021 at 7:43 AM

## 2021-10-05 NOTE — PROGRESS NOTES
INPATIENT PROGRESS NOTE        IDENTIFYING DATA/REASON FOR CONSULTATION   PATIENT:  Lefty Sorto  MRN:  [id number]  ADMIT DATE: 2021  TIME OF EVALUATION: 10/5/2021 11:22 AM  HOSPITAL STAY:   LOS: 6 days   CONSULTING PHYSICIAN: Kasi Walsh MD   REASON FOR CONSULTATION: chronic abdominal pain    Subjective:    Patient seen in follow up for chronic abdominal pain suspected due to mesenteric ischemia. Doppler US showed hemodynamically significant stenosis of the proximal celiac and superior mesenteric arteries. JUDD not well visualized. MEDICATIONS   SCHEDULED:  enoxaparin, 30 mg, Nightly  melatonin, 3 mg, Nightly  ALPRAZolam, 0.25 mg, BID  gabapentin, 300 mg, TID  lisinopril, 40 mg, Daily  pantoprazole, 40 mg, QAM AC  venlafaxine, 75 mg, Daily  amLODIPine, 5 mg, Nightly  nicotine, 1 patch, Daily  atorvastatin, 40 mg, Nightly  clopidogrel, 75 mg, Daily  sodium chloride flush, 5-40 mL, 2 times per day      FLUIDS/DRIPS:     sodium chloride       PRNs: acetaminophen, 650 mg, Q6H PRN  dicyclomine, 20 mg, Q6H PRN  diphenhydrAMINE, 25 mg, Nightly PRN  calcium carbonate, 500 mg, TID PRN  gabapentin, 300 mg, Q8H PRN  traMADol, 25 mg, Q6H PRN  labetalol, 10 mg, Q4H PRN  hydrALAZINE, 10 mg, Q6H PRN  sodium chloride, 25 mL, PRN  ondansetron, 4 mg, Q8H PRN   Or  ondansetron, 4 mg, Q6H PRN  polyethylene glycol, 17 g, Daily PRN  sodium chloride flush, 5-40 mL, PRN      ALLERGIES:    Allergies   Allergen Reactions    Iodine     Nsaids     Sulfa Antibiotics          PHYSICAL EXAM   VITALS:  BP (!) 162/78 Comment: manual  Pulse 80   Temp 98.1 °F (36.7 °C) (Oral)   Resp 18   Ht 5' 1\" (1.549 m)   Wt 110 lb 10.7 oz (50.2 kg) Comment: Patient refused standing scale  SpO2 98%   BMI 20.91 kg/m²   TEMPERATURE:  Current - Temp: 98.1 °F (36.7 °C);  Max - Temp  Av.3 °F (36.8 °C)  Min: 98.1 °F (36.7 °C)  Max: 98.5 °F (36.9 °C)    Physical Exam:  General appearance: alert, NAD, thin/frail  Eyes: Anicteric  Head: Normocephalic, without obvious abnormality  Lungs: clear to auscultation bilaterally, Normal Effort  Heart: regular rate and rhythm, normal S1 and S2, no murmurs or rubs  Abdomen: soft, non-distended, diffusely tender without rebound or guarding  Skin: warm and dry, no jaundice  Neuro: Grossly intact, A&OX3    LABS AND IMAGING   Laboratory   Recent Labs     10/03/21  0530 10/04/21  0513 10/05/21  0543   WBC 7.5 5.2 5.5   HGB 11.3* 11.6* 11.3*   HCT 33.9* 35.2* 34.3*   MCV 94.9 95.8 95.4    284 274     Recent Labs     10/03/21  0530 10/04/21  0513 10/05/21  0543   * 136 133*   K 3.9 4.5 4.2   CL 99 100 98*   CO2 22 23 25   BUN 43* 42* 28*   CREATININE 1.4* 1.2 0.8     No results for input(s): AST, ALT, ALB, BILIDIR, BILITOT, ALKPHOS in the last 72 hours. No results for input(s): LIPASE, AMYLASE in the last 72 hours. No results for input(s): PROTIME, INR in the last 72 hours. Imaging  VL AORTA/IVC/ILIAC/BYPASS GRAFT COMPLETE   Final Result      VL Extremity Venous Bilateral   Final Result      VL Extremity Venous Bilateral   Final Result      MRA NECK WO CONTRAST   Final Result   Normal contrast-enhanced MRA of the neck. No evidence of significant   stenosis. MRI BRAIN WO CONTRAST   Final Result   1. No evidence of acute infarct, hemorrhage, mass effect/midline shift, or   ventriculomegaly. 2. Moderate microvascular ischemic disease with a chronic left basal ganglia   lacunar infarct. 3. Focal flow-void in the anterior communicating artery region likely related   to a saccular aneurysm. Please refer to 09/30/2021 MRA head. XR ABDOMEN (KUB) (SINGLE AP VIEW)   Final Result   No significant finding in the abdomen. 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 Otherwise, no acute abnormality or flow-limiting stenosis in the major   arteries of the head.          XR CHEST 1 VIEW   Final Result   No acute disease CT HEAD WO CONTRAST   Final Result   No acute intracranial abnormality. Chronic small-vessel white matter ischemic changes. Left basal ganglia   lacunar infarct. Findings were discussed with Lissett Watson at 12:21 pm on   9/29/2021. Endoscopy      ASSESSMENT AND RECOMMENDATIONS   Madelyn Garcia is a 69 y.o. female with PMH of heroin abuse, COPD, HTN, asthma, CVA, seizures and chronic abdominal pain who presented on 9/29/2021 with left sided facial droop. Work-up in the ED noted significant hypertension with SBP in the 200s. She was admitted for further management of her blood pressure and for stroke work-up. CT head wo contrast and MRI of the brain was negative for acute infarct or hemorrhage. CTA neck was not obtained due to contrast allergy. MRA neck showed no significant stenosis. We have been consulted regarding abdominal pain.        1. Chronic abdominal pain suspect 2/2 chronic mesenteric ischemia  -pain worse with eating.  +anorexia and weight loss  -Doppler US today shows  hemodynamically significant stenosis of the proximal celiac and superior mesenteric arteries. JUDD not well visualized. -She's had imaging from 2018 demonstrating severe mesenteric stenosis   -Vasc Surgery consulted, possible angiography tomorrow. Appreciate their assistance      RECOMMENDATIONS:    Follow up on Vascular Surgery's plan  Continue supportive care    If you have any questions or need any further information, please feel free to contact us [phone]. Thank you for allowing us to participate in the care of Madelyn Garcia. The note was completed using Dragon voice recognition transcription. Every effort was made to ensure accuracy; however, inadvertent transcription errors may be present despite my best efforts to edit errors.     Isabel QUEVEDO      Attending physician addendum:      I have personally seen and examined the patient, reviewed the patient's medical record and pertinent labs and clinical imaging. I have personally staffed the case with EMY Mark. I agree with her consultation note, exam findings, assessment and plans  as written above. I have made appropriate modifications and edited her assessment and plan where needed to reflect my impression and plans for this patient. No acute events  BP (!) 162/78 Comment: manual  Pulse 80   Temp 98.1 °F (36.7 °C) (Oral)   Resp 18   Ht 5' 1\" (1.549 m)   Wt 110 lb 10.7 oz (50.2 kg) Comment: Patient refused standing scale  SpO2 98%   BMI 20.91 kg/m²    Gen- thin, NAD  Abd- mild TTP without G/R    Doppler study.      Hemodynamically significant stenosis of both the proximal celiac and    superior mesenteric arteries based on velocity criteria suggestive of    greater may 70% stenosis. JUDD not visualized. Impression    1) Chronic mesenteric ischemia  2) Opioid withdrawal  3) Hx of IV heroin abuse    Plan:  Patient is still resolving from heroin withdrawal.  Has chronic mesenteric ischemia (CMI). Appreciate vascular surgery seeing patient. Suspect her chronic post prandial pain is related to CMI. She will need vascular surgery follow up to discuss options after she has recovered from her IVDU and gone thru some rehab. No other GI workup is planned  Call with ? Continue supportive care. Continue Plavix. Thank you for allowing me to participate in this patient's care. If there are any questions or concerns regarding this patient, or the plan we have set in place, please feel free to contact me at [phone].      Luma Cipriano, DO

## 2021-10-05 NOTE — PROGRESS NOTES
[facility]   Speech Therapy  Daily Dysphagia Treatment Note        Herlinda Schulte  AGE: 69 y.o. GENDER: female  : 1950  [id number]  EPISODE DATE:  2021  Patient Active Problem List   Diagnosis    TIA (transient ischemic attack)    Severe malnutrition (HCC)     Allergies   Allergen Reactions    Iodine     Nsaids     Sulfa Antibiotics      Treatment Diagnosis: Dysphagia       HPI: 69 y. o. female with PMHx of Asthma, COPD and polysubstance abuse presented to [facility] in transfer from [facility] ED for evaluation of facial droop.  Patient was brought in by her sister who was concerned that patient had a left facial droop and is in heroin withdrawal.  Patient last used heroin Friday, 5 days ago. John Guadalupe sister brought her down from [place] to help her get clean.  She was started on clonidine and antiemetic on Saturday after being seen in an outside hospital.  Sister states today she noted a facial droop.  No other neurological findings.  When patient was brought back to the emergency department they did not note a facial droop.  She had no neurological deficit.  She does complain of headache along with nausea, vomiting and diarrhea consistent with heroin withdrawal.     Recent Chest Xray: 2021  Impression   No acute disease   Head CT: 2021  Impression   No acute intracranial abnormality.       Chronic small-vessel white matter ischemic changes.  Left basal ganglia   lacunar infarct. X-RAY ABDOMEN 10/1/21:  Impression   No significant finding in the abdomen.           GI NOTE 10/5/21: Patient seen in follow up for chronic abdominal pain suspected due to mesenteric ischemia. Doppler US showed hemodynamically significant stenosis of the proximal celiac and superior mesenteric arteries. JUDD not well visualized.       Subjective:     Current diet: reg/thin    Comments regarding tolerating Current Diet: RN reports limited PO intake    Objective:     Pain   Patient Currently in Pain: Denies    Cognitive/Behavior   Behavior/Cognition: Alert, Cooperative, Lethargic    Positioning Upright in bed    PO Trials:  · Thin Liquids: Decreased laryngeal elevation, no overt s/s of aspiration or penetration with continuous gulps of thin liquid via straw  · Nectar thick liquids: DNT  · Honey Thick liquids: DNT  · Puree: DNT  · Soft food: DNT  · Regular food: Patient reports min difficulty with regular textures, slow prolonged mastication with adequate clearance of bolus, tolerated with no overt s/s of aspiration or penetration     Dysphagia Tx:   Direct Dysphagia tx: PO tolerance as indicated above. Patient seen at lunch and demonstrates improved PO intake consuming 1/2 hamburger, 1/2 sweet potato and a couple bites of fruit. Pt reports she has to eat in small portions because she often feels bloated and attempts to eat several meats throughout day. Pt seen with regular textures with prolonged but effective mastication of regular textures. Tolerating both solids and liquids with no overt s/s of aspiration or penetration. Dysphagia ex: N/A  Training in compensatory strategies: Educated on taking small bites and choosing softer foods to assist with mastication difficulties. Pt response to ex/training: Verbalized understanding    Goals:   Dysphagia Goals: The patient will tolerate recommended diet without observed clinical signs of aspiration Ongoing  The patient will tolerate repeat BSE when able. Goal met    Assessment:   Impressions:   Dysphagia Diagnosis: Mild oral stage dysphagia, Mild pharyngeal stage dysphagia     Dysphagia Impression :   · Pt awake and alert upon SLP entry. Pt oriented to self, location, and time. Pt edentulous for assessment but reports wearing top/bottom dentures at baseline. Limited PO sample d/t reduced appetite.    · Mild oropharyngeal stage dysphagia characterized by prolonged but effective mastication for regular d/t edentulism, and decreased laryngeal elevation for all. No overt s/s of aspiration with any trials  · Recommend regular/thin. Meds PO.   · ST to f/u 1x to monitor for diet tolerance    Diet Recommendations: Regular, thin   Recommended Form of Meds: PO    Strategies:   Compensatory Swallowing Strategies: Upright as possible for all oral intake, Remain upright for 30-45 minutes after meals    Education:  Consulted and agree with results and recommendations: Patient, RN  Patient Education: results, recommendations  Patient Education Response: Verbalizes understanding    Prognosis: Good for diet tolerance    Plan:     Continue Dysphagia Therapy:   Interventions: Therapeutic Interventions: Therapeutic PO trials with SLP, Diet tolerance monitoring     Duration/Frequency of therapy while on unit: Duration/Frequency of Treatment  Duration/Frequency of Treatment: 1-3x to confirm PO tolerance and monitor for SLP eval  Discharge Instructions:  Anticipate NO for further skilled Speech Therapy for Dysphagia at discharge    This note serves as a D/C Summary in the event that this patient is discharged prior to the next therapy session.     Coded treatment time: 0  Total treatment time: [place], [place], [address] [place] [address]  Speech-Language Pathologist  On 10/05/21 at 1:00 PM ,DirectAddress_Unknown,DirectAddress_Unknown

## 2021-10-05 NOTE — PLAN OF CARE
Problem: Pain:  Goal: Pain level will decrease  Description: Pain level will decrease  Outcome: Completed     Problem: Pain:  Goal: Control of acute pain  Description: Control of acute pain  Outcome: Completed     Problem: Pain:  Goal: Control of chronic pain  Description: Control of chronic pain  Outcome: Completed     Problem: Pain:  Goal: Patient's pain/discomfort is manageable  Description: Patient's pain/discomfort is manageable  Outcome: Completed     Problem: Skin Integrity:  Goal: Will show no infection signs and symptoms  Description: Will show no infection signs and symptoms  Outcome: Completed     Problem: Skin Integrity:  Goal: Absence of new skin breakdown  Description: Absence of new skin breakdown  Outcome: Completed     Problem: HEMODYNAMIC STATUS  Goal: Patient has stable vital signs and fluid balance  Outcome: Completed     Problem: ACTIVITY INTOLERANCE/IMPAIRED MOBILITY  Goal: Mobility/activity is maintained at optimum level for patient  Outcome: Completed     Problem: COMMUNICATION IMPAIRMENT  Goal: Ability to express needs and understand communication  Outcome: Completed     Problem: Nutrition  Goal: Optimal nutrition therapy  Outcome: Completed     Problem: Falls - Risk of:  Goal: Will remain free from falls  Description: Will remain free from falls  Outcome: Completed     Problem: Falls - Risk of:  Goal: Absence of physical injury  Description: Absence of physical injury  Outcome: Completed     Problem: Infection:  Goal: Will remain free from infection  Description: Will remain free from infection  Outcome: Completed     Problem: Safety:  Goal: Free from accidental physical injury  Description: Free from accidental physical injury  Outcome: Completed     Problem: Safety:  Goal: Free from intentional harm  Description: Free from intentional harm  Outcome: Completed     Problem: Daily Care:  Goal: Daily care needs are met  Description: Daily care needs are met  Outcome: Completed     Problem: Skin Integrity:  Goal: Skin integrity will stabilize  Description: Skin integrity will stabilize  Outcome: Completed     Problem: Discharge Planning:  Goal: Patients continuum of care needs are met  Description: Patients continuum of care needs are met  Outcome: Completed     Problem:  Activity:  Goal: Ability to tolerate increased activity will improve  Description: Ability to tolerate increased activity will improve  Outcome: Completed

## 2021-10-05 NOTE — PROGRESS NOTES
Discussed with patient's sister. They would like to consider all options and have a sitdown discussion about treating her CMI. They would also like to try and rehabilitate her before pursuing aggressive treatment measures. Plan for OP visit in 1-2 months. Okay for discharge from vascular perspective as patient's mesenteric ischemia is chronic and unchanged from previous assessments. Please call with any questions or concerns.

## 2021-10-05 NOTE — CONSULTS
[facility]  Consultation Note    10/5/2021 9:25 AM    Chief Complaint: Abdominal Pain     Reason for Consultation: Mesenteric Ischemia    History of Present Illness  Patient is a 69 y.o. female who presented to the ED on 9/29/2021 with complaints of Abdominal Pain and Headache and Left Facial Droop. She has a significant PMH of CVA, COPD, HTN, Seizures and Heroin Abuse. She is apparently lives near [place] but came to [place] to live with her sister in an effort to stop using heroin. She was seen recently at [facility] on 9/25/2021 for Opiate Withdrawal. Upon further review of Care Everywhere, the patient has imaging dating back to September 2018 with severe SMA stenosis. She had been following with a Vascular Surgeon in [place] within the past year; however, it is unclear how diligent she has been with follow up. She reports weight loss and according to Care Everywhere she has lost about 17 lbs over the past 6 months. She states she was taking prescription opioids for her abdominal pain in the past. She reports she has a desire to eat but is unable to do so because of her abdominal pain. She states her abdominal pain is \"constant\" and rates it at a 9/10 with no relieving factors. Of note, there was no facial droop seen when the patient was in the ED or by Neurology. The patient had an MRI which was negative for an acute infarct on 10/2/2021. Neurology evaluated the patient and has suggested Plavix and Statin and has signed off. A CT with contrast imaging was unable to be ordered in the ED as the patient and her sister report a \"life threatening allergy to contrast\". We have been consulted to evaluate the patient for Mesenteric Ischemia. At present, the patient is with abdominal pain but stable. Review of Systems  Review of Systems   Constitutional: Positive for appetite change and unexpected weight change.         Desire to eat but unable to eat d/t abdominal pain   HENT: Negative. Respiratory: Negative for shortness of breath. Cardiovascular: Negative for chest pain and palpitations. Gastrointestinal: Positive for abdominal pain and nausea. Genitourinary: Negative. Musculoskeletal: Negative. Skin: Negative for wound. Neurological: Positive for seizures. History of seizures        Past Medical History:   Diagnosis Date    Asthma     [facility] with [facility]     COPD (chronic obstructive pulmonary disease) (HCC)     Hypertension     Seizures ([facility]        History reviewed. No pertinent surgical history. Allergies   Allergen Reactions    Iodine     Nsaids     Sulfa Antibiotics        Social History     Socioeconomic History    Marital status: Single     Spouse name: Not on file    Number of children: Not on file    Years of education: Not on file    Highest education level: Not on file   Occupational History    Not on file   Tobacco Use    Smoking status: Current Every Day Smoker     Packs/day: 0.50    Smokeless tobacco: Current User   Substance and Sexual Activity    Alcohol use: Not Currently    Drug use: Yes     Types: Marijuana     Comment: heroin  snorts daily last used 5 days ago    Sexual activity: Not Currently   Other Topics Concern    Not on file   Social History Narrative    Not on file     Social Determinants of Health     Financial Resource Strain:     Difficulty of Paying Living Expenses:    Food Insecurity:     Worried About Running Out of Food in the Last Year:     Ran Out of Food in the Last Year:    Transportation Needs:     Lack of Transportation (Medical):      Lack of Transportation (Non-Medical):    Physical Activity:     Days of Exercise per Week:     Minutes of Exercise per Session:    Stress:     Feeling of Stress :    Social Connections:     Frequency of Communication with Friends and Family:     Frequency of Social Gatherings with Friends and Family:     Attends Latter day Services:     Active Member of Clubs or Organizations:     Attends Club or Organization Meetings:     Marital Status:    Intimate Partner Violence:     Fear of Current or Ex-Partner:     Emotionally Abused:     Physically Abused:     Sexually Abused:        History reviewed. No pertinent family history.     Vital Signs  Vitals:    10/05/21 0452 10/05/21 0455 10/05/21 0844 10/05/21 0845   BP:  (!) 196/74 (!) 159/75 (!) 162/78   Pulse:  90 80    Resp:  18 18    Temp:  98.3 °F (36.8 °C) 98.1 °F (36.7 °C)    TempSrc:  Oral Oral    SpO2:  97% 98%    Weight: 110 lb 10.7 oz (50.2 kg)      Height:           Physical Exam:  General: no apparent distress, alert and oriented, afebrile  Psychiatric: withdrawn affect  Head/Eyes/Ears/Nose/Throat: normocephalic, atraumatic, face symmetric  Neck: supple, symmetrical, trachea midline  Chest/Lungs: respirations easy, no accessory muscle use  Cardiac: regular rate and rhythm, NSR  Abdomen: soft, active bowel sounds, mild tenderness with palpation  Extremities: warm and well perfused, no signs of cyanosis or ischemia, no significant edema, bilateral upper and lower extremity motorsensory intact  Vascular exam:  -R Femoral: +2 palp  -L Femoral: +2 palp    Labs  Lab Results   Component Value Date    WBC 5.5 10/05/2021    HGB 11.3 10/05/2021    HCT 34.3 10/05/2021    MCV 95.4 10/05/2021     10/05/2021     Lab Results   Component Value Date     10/05/2021    K 4.2 10/05/2021    K 3.5 09/29/2021    CL 98 10/05/2021    CO2 25 10/05/2021    BUN 28 10/05/2021    CREATININE 0.8 10/05/2021      No components found for: GLU    Scheduled Meds:    enoxaparin  30 mg SubCUTAneous Nightly    melatonin  3 mg Oral Nightly    ALPRAZolam  0.25 mg Oral BID    gabapentin  300 mg Oral TID    lisinopril  40 mg Oral Daily    pantoprazole  40 mg Oral QAM AC    venlafaxine  75 mg Oral Daily    amLODIPine  5 mg Oral Nightly    nicotine  1 patch TransDERmal Daily    atorvastatin  40 mg Oral Nightly    clopidogrel  75 mg Oral Daily    sodium chloride flush  5-40 mL IntraVENous 2 times per day     Continuous Infusions:    sodium chloride         Imaging:   Mesenteric Vasculature Duplex - 10/5/2021  Tech Comments     UnilateralNo abdominal Aorta aneurysm demonstrated. Mild plaque visualized  along the walls of the Aorta. The origin Superior Mesentery Artery (SMA) appears to have a hemodynamically  significant stenosis based on Doppler velocity criteria with velocity over 600  cm/s. Elevated systolic velocities noted in the proximal Celiac Artery with velocity  over 300 cm/s. The Inferior Mesentery Artery (JUDD) could not be insonated due to bowel gas. Assessment:  -Chronic abdominal pain - multiple imaging studies which have revealed severe SMA stenosis since September 2018  -Heroin withdrawal  -Weight loss of 17 lbs over the past 6 months  -CVA history    Plan:   -Can consider Angiography tomorrow to further evaluate stenosis  -Pt would likely need small Heparin bolus during procedure - d/w Neurology given history of CVA and admission with CVA-like symptoms  -Would need to be pre-medicated given allergy to contrast    All pertinent information and plan of care discussed with Dr. Aurelio Burkett. All questions and concerns were addressed with the patient. I have discussed the above stated plan with patient and the nurse. The patient verbalized understanding and agreed with the plan.     Thank you for allowing to us to participate in the care of Mary Jo Parkinson      Electronically signed by ANNA MARIE Taylor CNP on 10/5/2021 at 9:25 AM

## 2021-10-05 NOTE — PROGRESS NOTES
Speech Language Pathology    Attempted to see patient for dysphagia tx. Patient currently with PCA getting shower. Will re-attempt as schedule permits.     Mars [place], [place], [facility]  Speech-Language Pathologist  On 10/05/21 at 11:26  AM

## 2021-10-05 NOTE — PROGRESS NOTES
Discharge instructions reviewed with pt/family, discussed medications, VU, denies any further questions or anxiety related to discharge. Educational handouts in regards to new medications, given via [employer], side effects discussed, VU. IV/tele discontinued. Taken from room via wheelchair, personal belongings taken with. Pt instructed to follow up with Dr. Alexey Hutchinson and Dr. Norah Lorenzo within the next two weeks. New medications supplied through outpatient pharmacy.      Electronically signed by Rusty Garcia RN on 10/5/21 at 2:11 PM EDT

## 2021-10-05 NOTE — DISCHARGE SUMMARY
Hospital Medicine Discharge Summary    Patient ID: Lis Rowe      Patient's PCP: No primary care provider on file. Admit Date: 9/29/2021     Discharge Date:   10/05/2021    Admitting Physician: Frida Mccrary DO     Discharge Physician: Lex Law MD     Discharge Diagnoses: Active Hospital Problems    Diagnosis     Severe malnutrition ([facility] [address] [E43]     TIA (transient ischemic attack) [G45.9]        The patient was seen and examined on day of discharge and this discharge summary is in conjunction with any daily progress note from day of discharge. Hospital Course:     From HPI:\"70 y.o. female with PMHx of Asthma, COPD and polysubstance abuse presented to [facility] in transfer from [facility] ED for evaluation of facial droop. Patient was brought in by her sister who was concerned that patient had a left facial droop and is in heroin withdrawal.  Patient last used heroin Friday, 5 days ago. Her sister brought her down from [place] to help her get clean. She was started on clonidine and antiemetic on Saturday after being seen in an outside hospital.  Sister states today she noted a facial droop. No other neurological findings. When patient was brought back to the emergency department they did not note a facial droop. She had no neurological deficit. She does complain of headache along with nausea, vomiting and diarrhea consistent with heroin withdrawal.     ED obtained CT head, she has life-threatening allergy to CT contrast so CTA was not obtained. ED work-up was unremarkable and patient will be admitted for MRI and neuro eval.\"          1. Possible TIA, MRI negative for acute changes, neurology has been consulted, started on Plavix, statin, no further events  2. Saccular aneurysm, 4 mm, neurosurgery consulted, no immediate recommendations for intervention, follow-up with neurosurgery as outpatient  3. Acute renal failure, improving  4.  Essential hypertension, uncontrolled, restarting lisinopril as kidney function improving, will increase amlodipine further  5. PFO, follow-up with cardiology as outpatient, ultrasound lower extremity without DVT  6. Heroin abuse, counseled,  helping with rehab  7. Mesenteric chronic ischemia, discussed with vascular Dralexandra Thakkar to discharge for now and follow-up as outpatient no need for further intervention at this time          Physical Exam Performed:     BP (!) 162/78 Comment: manual  Pulse 80   Temp 98.1 °F (36.7 °C) (Oral)   Resp 18   Ht 5' 1\" (1.549 m)   Wt 110 lb 10.7 oz (50.2 kg) Comment: Patient refused standing scale  SpO2 98%   BMI 20.91 kg/m²       PER PROGRESS NOTE       Labs: For convenience and continuity at follow-up the following most recent labs are provided:      CBC:    Lab Results   Component Value Date    WBC 5.5 10/05/2021    HGB 11.3 10/05/2021    HCT 34.3 10/05/2021     10/05/2021       Renal:    Lab Results   Component Value Date     10/05/2021    K 4.2 10/05/2021    K 3.5 09/29/2021    CL 98 10/05/2021    CO2 25 10/05/2021    BUN 28 10/05/2021    CREATININE 0.8 10/05/2021    CALCIUM 9.3 10/05/2021         Significant Diagnostic Studies    Radiology:   VL AORTA/IVC/ILIAC/BYPASS GRAFT COMPLETE   Final Result      VL Extremity Venous Bilateral   Final Result      VL Extremity Venous Bilateral   Final Result      MRA NECK WO CONTRAST   Final Result   Normal contrast-enhanced MRA of the neck. No evidence of significant   stenosis. MRI BRAIN WO CONTRAST   Final Result   1. No evidence of acute infarct, hemorrhage, mass effect/midline shift, or   ventriculomegaly. 2. Moderate microvascular ischemic disease with a chronic left basal ganglia   lacunar infarct. 3. Focal flow-void in the anterior communicating artery region likely related   to a saccular aneurysm. Please refer to 09/30/2021 MRA head.          XR ABDOMEN (KUB) (SINGLE AP VIEW)   Final Result   No significant finding in the abdomen. 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 Otherwise, no acute abnormality or flow-limiting stenosis in the major   arteries of the head. XR CHEST 1 VIEW   Final Result   No acute disease         CT HEAD WO CONTRAST   Final Result   No acute intracranial abnormality. Chronic small-vessel white matter ischemic changes. Left basal ganglia   lacunar infarct. Findings were discussed with Lissett Watson at 12:21 pm on   9/29/2021.                 Consults:     IP CONSULT TO STROKE TEAM  IP CONSULT TO PHARMACY  PHARMACY TO CHANGE BASE FLUIDS  IP CONSULT TO SOCIAL WORK  IP CONSULT TO NEUROLOGY  IP CONSULT TO NEUROSURGERY  IP CONSULT TO SOCIAL WORK  IP CONSULT TO GI  IP CONSULT TO VASCULAR SURGERY    Disposition:  Home     Condition at Discharge: Stable    Discharge Instructions/Follow-up:  PCP, NS, Cardiology, VS    Code Status:  Full Code     Activity: activity as tolerated    Diet: cardiac diet      Discharge Medications:     Current Discharge Medication List           Details   clopidogrel (PLAVIX) 75 MG tablet Take 1 tablet by mouth daily  Qty: 30 tablet, Refills: 0      calcium carbonate (TUMS) 500 MG chewable tablet Take 1 tablet by mouth 3 times daily as needed for Heartburn  Qty: 14 tablet, Refills: 0      diphenhydrAMINE (BENADRYL) 25 MG tablet Take 1 tablet by mouth nightly as needed for Sleep  Qty: 14 tablet, Refills: 0      atorvastatin (LIPITOR) 40 MG tablet Take 1 tablet by mouth nightly  Qty: 30 tablet, Refills: 3      melatonin 3 MG TABS tablet Take 1 tablet by mouth nightly for 14 days  Qty: 14 tablet, Refills: 0      nicotine (NICODERM CQ) 14 MG/24HR Place 1 patch onto the skin daily  Qty: 30 patch, Refills: 3      dicyclomine (BENTYL) 20 MG tablet Take 1 tablet by mouth every 6 hours as needed (Abdominal Cramping)  Qty: 120 tablet, Refills: 3              Details amLODIPine (NORVASC) 10 MG tablet Take 1 tablet by mouth daily  Qty: 30 tablet, Refills: 0      gabapentin (NEURONTIN) 300 MG capsule Take 1 capsule by mouth 3 times daily for 14 days. Qty: 42 capsule, Refills: 0              Details   pantoprazole (PROTONIX) 40 MG tablet Take 40 mg by mouth daily      ALPRAZolam (XANAX) 0.25 MG tablet Take 0.25 mg by mouth 2 times daily. venlafaxine (EFFEXOR XR) 75 MG extended release capsule Take 75 mg by mouth daily      Indapamide (LOZOL PO) Take 2.5 mg by mouth daily       linaclotide (LINZESS) 145 MCG capsule Take 145 mcg by mouth every morning (before breakfast)      albuterol sulfate HFA (VENTOLIN HFA) 108 (90 Base) MCG/ACT inhaler Inhale 2 puffs into the lungs every 6 hours as needed for Wheezing      ondansetron (ZOFRAN-ODT) 4 MG disintegrating tablet Take 4 mg by mouth every 8 hours as needed for Nausea or Vomiting             Time Spent on discharge is more than 1 hour in the examination, evaluation, counseling and review of medications and discharge plan. Signed:    Rome Trivedi MD   10/5/2021      Thank you No primary care provider on file. for the opportunity to be involved in this patient's care. If you have any questions or concerns please feel free to contact me at [phone].

## 2021-10-06 LAB — C1 ESTERASE INHIBITOR: 38 MG/DL (ref 21–39)

## 2021-10-08 ENCOUNTER — TELEPHONE (OUTPATIENT)
Dept: CARDIOLOGY CLINIC | Age: 71
End: 2021-10-08

## 2021-10-08 NOTE — TELEPHONE ENCOUNTER
Please call to schedule patient to be seen next week with Dr Merchant Bonds [facility] [place] follow up to discuss echo. Okay to double book.

## 2021-10-13 NOTE — PROGRESS NOTES
[address]  Cardiology Consult    Lilly Olivera  1950 October 14, 2021      Reason for Referral: PFO/ASD    CC: \"I continue to abdominal pain. \"      Subjective:     History of Present Illness:    Lilly Olivera is a 69 y.o. patient with a PMH significant for hypertension, asthma, polysubstance abuse, CVA and COPD. She presented to the emergency room on 9/29/2021 with left facial droop and heroin withdrawal.   Today, she is here for hospital follow up. She says that she has been told in the past that she has an irregular heart beat. She is unsure of what the irregularity is. She recently moved her from [place]. She continues to have abdominal pain and says that she has been out of dicyclomine due to needing authorization. Patient denies exertional chest pain/pressure, dyspnea at rest, HDZ, PND, orthopnea, palpitations, lightheadedness, weight changes, changes in LE edema, and syncope. Past Medical History:   has a past medical history of Asthma, Cerebral artery occlusion with cerebral infarction ([facility] [address] COPD (chronic obstructive pulmonary disease) ([facility] [address] Hypertension, and Seizures ([facility] [address] Surgical History:   has no past surgical history on file. Social History:   reports that she has been smoking. She has been smoking about 0.50 packs per day. She uses smokeless tobacco. She reports previous alcohol use. She reports current drug use. Drug: Marijuana. Family History:  family history is not on file. Home Medications:  Were reviewed and are listed in nursing record and/or below  Prior to Admission medications    Medication Sig Start Date End Date Taking?  Authorizing Provider   dicyclomine (BENTYL) 20 MG tablet Take 1 tablet by mouth every 6 hours as needed (Abdominal Cramping) 10/14/21  Yes Walt Mata MD   clopidogrel (PLAVIX) 75 MG tablet Take 1 tablet by mouth daily 10/5/21  Yes Kasi Barton MD   amLODIPine (NORVASC) 10 MG tablet Take 1 tablet by mouth daily 10/5/21  Yes Kasi Cai MD   calcium carbonate (TUMS) 500 MG chewable tablet Take 1 tablet by mouth 3 times daily as needed for Heartburn 10/3/21 11/2/21 Yes Mcaey Mcelroy MD   gabapentin (NEURONTIN) 300 MG capsule Take 1 capsule by mouth 3 times daily for 14 days. 10/3/21 10/17/21 Yes Macey Mcelroy MD   diphenhydrAMINE (BENADRYL) 25 MG tablet Take 1 tablet by mouth nightly as needed for Sleep 10/3/21 11/2/21 Yes Macey Mcelroy MD   atorvastatin (LIPITOR) 40 MG tablet Take 1 tablet by mouth nightly 10/3/21  Yes Macey Mcelroy MD   melatonin 3 MG TABS tablet Take 1 tablet by mouth nightly for 14 days 10/3/21 10/17/21 Yes Macey Mcelroy MD   nicotine (NICODERM CQ) 14 MG/24HR Place 1 patch onto the skin daily 10/4/21  Yes Macey Mcelroy MD   albuterol sulfate HFA (VENTOLIN HFA) 108 (90 Base) MCG/ACT inhaler Inhale 2 puffs into the lungs every 6 hours as needed for Wheezing   Yes Historical Provider, MD   pantoprazole (PROTONIX) 40 MG tablet Take 40 mg by mouth daily   Yes Historical Provider, MD   ALPRAZolam (XANAX) 0.25 MG tablet Take 0.25 mg by mouth 2 times daily. Yes Historical Provider, MD   venlafaxine (EFFEXOR XR) 75 MG extended release capsule Take 75 mg by mouth daily   Yes Historical Provider, MD   ondansetron (ZOFRAN-ODT) 4 MG disintegrating tablet Take 4 mg by mouth every 8 hours as needed for Nausea or Vomiting   Yes Historical Provider, MD   Indapamide (LOZOL PO) Take 2.5 mg by mouth daily    Yes Historical Provider, MD   linaclotide (LINZESS) 145 MCG capsule Take 145 mcg by mouth every morning (before breakfast)   Yes Historical Provider, MD        CURRENT Medications:  No current facility-administered medications for this visit. Allergies:  Iodine, Nsaids, and Sulfa antibiotics           Review of Systems: SEE HPI   · Constitutional: no unanticipated weight loss. There's been no change in energy level, sleep pattern, or activity level. No fevers, chills. · Eyes: No visual changes or diplopia.  No scleral icterus. · ENT: No Headaches, hearing loss or vertigo. No mouth sores or sore throat. · Cardiovascular: No Chest pain, tightness or discomfort.  No Shortness of breath. No Dyspnea on exertion, Orthopnea, Paroxysmal nocturnal dyspnea or breathlessness at rest.   No Palpitations.  No Syncope ('blackouts', 'faints', 'collapse') or dizziness. · Respiratory: No cough or wheezing, no sputum production. No hematemesis. · Gastrointestinal: No abdominal pain, appetite loss, blood in stools. No change in bowel or bladder habits. · Genitourinary: No dysuria, trouble voiding, or hematuria. · Musculoskeletal:  No gait disturbance, no joint complaints. · Integumentary: No rash or pruritis. · Neurological: No headache, diplopia, change in muscle strength, numbness or tingling. · Psychiatric: No anxiety or depression. · Endocrine: No temperature intolerance. No excessive thirst, fluid intake, or urination. No tremor. · Hematologic/Lymphatic: No abnormal bruising or bleeding, blood clots or swollen lymph nodes. · Allergic/Immunologic: No nasal congestion or hives. Objective:     PHYSICAL EXAM:      Vitals:    10/14/21 1429   BP: (!) 158/76   Pulse:    SpO2:     Weight: 114 lb (51.7 kg)       General Appearance:  Alert, cooperative, no distress, appears stated age. Head:  Normocephalic, without obvious abnormality, atraumatic. Eyes:  Pupils equal and round. No scleral icterus. Mouth: Moist mucosa, no pharyngeal erythema. Nose: Nares normal. No drainage or sinus tenderness. Neck: Supple, symmetrical, trachea midline. No adenopathy. No tenderness/mass/nodules. No carotid bruit or elevated JVD. Lungs:   Respiratory Effort: Normal   Auscultation: Clear to auscultation bilaterally, respirations unlabored. No wheeze, rales   Chest Wall:  No tenderness or deformity. Cardiovascular:    Pulses  Palpation: normal   Ascultation: Regular rate, S1/ S2 normal. No murmur, rub, or gallop.   2+ radial and pedal pulses, symmetric  Carotid  Femoral   Abdomen and Gastrointestinal:   Soft, non-tender, bowel sounds active. Liver and Spleen  Masses   Musculoskeletal: No muscle wasting  Back  Gait   Extremities: Extremities normal, atraumatic. No cyanosis or edema. No cyanosis clubbing       Skin: Inspection and palpation performed, no rashes or lesions. Pysch: Normal mood and affect. Alert and oriented to time place person   Neurologic: Normal gross motor and sensory exam.       Labs     All labs have been reviewed    Lab Results   Component Value Date    WBC 5.5 10/05/2021    RBC 3.59 10/05/2021    HGB 11.3 10/05/2021    HCT 34.3 10/05/2021    MCV 95.4 10/05/2021    RDW 13.6 10/05/2021     10/05/2021     Lab Results   Component Value Date     10/05/2021    K 4.2 10/05/2021    K 3.5 09/29/2021    CL 98 10/05/2021    CO2 25 10/05/2021    BUN 28 10/05/2021    CREATININE 0.8 10/05/2021    GFRAA >60 10/05/2021    AGRATIO 1.2 09/29/2021    LABGLOM >60 10/05/2021    GLUCOSE 109 10/05/2021    PROT 8.6 09/29/2021    CALCIUM 9.3 10/05/2021    BILITOT 0.7 09/29/2021    ALKPHOS 29 09/29/2021    AST 19 09/29/2021    ALT 7 09/29/2021     No results found for: PTINR  Lab Results   Component Value Date    LABA1C 5.4 09/30/2021     Lab Results   Component Value Date    TROPONINI <0.01 09/29/2021       Cardiac, Vascular and Imaging Data all Personally Reviewed in Detail by Myself      EKG:     Echocardiogram:   ECHO 9/30/2021  A bubble study was performed and shows evidence of right to left shunting  consistent with a patent foramen ovale or atrial septal defect. Mild concentric LVH with normal LV size and wall motion. EF is   70%. Grade  I diastolic dysfunction with normal LV filling pressures. Trivial mitral regurgitation. The right ventricle is normal in size and function. Stress Test:     Cath: Other imaging:   MRI brain 10/2/2021  IMPRESSION:  1.  No evidence of acute infarct, hemorrhage, mass effect/midline shift, or  ventriculomegaly. 2. Moderate microvascular ischemic disease with a chronic left basal ganglia  lacunar infarct. 3. Focal flow-void in the anterior communicating artery region likely related  to a saccular aneurysm. Please refer to 09/30/2021 MRA head. Assessment and Plan     TIA  Will assess for cardiac arrhythmia with 30 day event monitor. Continue Plavix and statin therapy. PFO/ASD  Noted on echocardiogram. Will order 30 day event monitor. Continue Plavix and statin therapy. Encouraged medication compliance. Celiac/mesenteric stenosis  Increased abdominal pain. Following with Dr Fabricio Ward. Hypertension  Elevated today. I have asked her to maintain a blood pressure diary for 2 weeks prior to adjusting medications. Follow up in 1 month. Thank you for allowing us to participate in the care of Jessica Shin. Please do not hesitate to contact me if you have any questions. Cuong Valladares MD, MPH    [address] [place]   [place] [address]  Ph: [phone]  Fax: [phone]      This note was scribed in the presence of Dr Sandra Fulton, by Alphonso Lindo RN  Physician Attestation:  The scribes documentation has been prepared under my direction and personally reviewed by me in its entirety. I confirm that the note above accurately reflects all work, treatment, procedures, and medical decision making performed by me.

## 2021-10-14 ENCOUNTER — OFFICE VISIT (OUTPATIENT)
Dept: CARDIOLOGY CLINIC | Age: 71
End: 2021-10-14
Payer: MEDICARE

## 2021-10-14 VITALS
WEIGHT: 114 LBS | HEIGHT: 61 IN | OXYGEN SATURATION: 99 % | BODY MASS INDEX: 21.52 KG/M2 | SYSTOLIC BLOOD PRESSURE: 158 MMHG | DIASTOLIC BLOOD PRESSURE: 76 MMHG | HEART RATE: 88 BPM

## 2021-10-14 DIAGNOSIS — Q21.10 ASD (ATRIAL SEPTAL DEFECT): ICD-10-CM

## 2021-10-14 DIAGNOSIS — I10 ESSENTIAL HYPERTENSION: ICD-10-CM

## 2021-10-14 DIAGNOSIS — Q21.12 PFO (PATENT FORAMEN OVALE): ICD-10-CM

## 2021-10-14 DIAGNOSIS — K55.1 MESENTERIC ARTERY STENOSIS (HCC): Primary | ICD-10-CM

## 2021-10-14 DIAGNOSIS — G45.9 TIA (TRANSIENT ISCHEMIC ATTACK): ICD-10-CM

## 2021-10-14 PROCEDURE — 99204 OFFICE O/P NEW MOD 45 MIN: CPT | Performed by: INTERNAL MEDICINE

## 2021-10-14 RX ORDER — DICYCLOMINE HCL 20 MG
20 TABLET ORAL EVERY 6 HOURS PRN
Qty: 120 TABLET | Refills: 3 | Status: SHIPPED | OUTPATIENT
Start: 2021-10-14

## 2021-10-14 NOTE — PATIENT INSTRUCTIONS
Patient Education        Transient Ischemic Attack: Care Instructions  Overview     A transient ischemic attack (TIA) is when blood flow to a part of your brain is blocked for a short time. A TIA is like a stroke but usually lasts only a few minutes. A TIA does not cause lasting brain damage. Any vision problems, slurred speech, or other symptoms usually go away in 10 to 20 minutes. But they may last for up to 24 hours. TIAs are often warning signs of a stroke. Some people who have a TIA may have a stroke in the future. A stroke can cause symptoms like those of a TIA. But a stroke causes lasting damage to your brain. You can take steps to help prevent a stroke. One thing you can do is get early treatment. If you have other new symptoms, or if your symptoms do not get better, go back to the emergency room or call your doctor right away. Getting treatment right away may prevent long-term brain damage caused by a stroke. Follow-up care is a key part of your treatment and safety. Be sure to make and go to all appointments, and call your doctor if you are having problems. It's also a good idea to know your test results and keep a list of the medicines you take. How can you care for yourself at home? Medicines    · Be safe with medicines. Take your medicines exactly as prescribed. Call your doctor if you think you are having a problem with your medicine.     · If you take a blood thinner, such as aspirin, be sure you get instructions about how to take your medicine safely. Blood thinners can cause serious bleeding problems.     · Call your doctor if you are not able to take your medicines for any reason.     · Do not take any over-the-counter medicines or herbal products without talking to your doctor first.     · If you take birth control pills or hormone therapy, talk to your doctor. Ask if these treatments are right for you. Lifestyle changes    · Do not smoke.  If you need help quitting, talk to your doctor Care instructions adapted under license by [facility] ([facility]). If you have questions about a medical condition or this instruction, always ask your healthcare professional. [address] any warranty or liability for your use of this information.

## 2021-10-15 NOTE — TELEPHONE ENCOUNTER
Ca Lambert is calling in stating that the medication Bentyl needs a prior authorization and that the medication needs to say \"blockage in arteries causing pain\" for the insurance to cover it. She states that Jaret is in a lot of pain and will need the medication filled today.        Ca Lambert can be reached at [phone]

## 2021-10-18 ENCOUNTER — OFFICE VISIT (OUTPATIENT)
Dept: SURGERY | Age: 71
End: 2021-10-18
Payer: MEDICARE

## 2021-10-18 VITALS — BODY MASS INDEX: 22.18 KG/M2 | DIASTOLIC BLOOD PRESSURE: 64 MMHG | WEIGHT: 117.4 LBS | SYSTOLIC BLOOD PRESSURE: 186 MMHG

## 2021-10-18 DIAGNOSIS — K55.1 CHRONIC MESENTERIC ISCHEMIA (HCC): Primary | ICD-10-CM

## 2021-10-18 PROCEDURE — 99213 OFFICE O/P EST LOW 20 MIN: CPT | Performed by: STUDENT IN AN ORGANIZED HEALTH CARE EDUCATION/TRAINING PROGRAM

## 2021-10-18 RX ORDER — CLONIDINE HYDROCHLORIDE 0.1 MG/1
0.1 TABLET ORAL 2 TIMES DAILY
COMMUNITY
Start: 2021-09-25 | End: 2021-10-18

## 2021-10-18 RX ORDER — FLUOXETINE HYDROCHLORIDE 20 MG/1
20 CAPSULE ORAL
COMMUNITY

## 2021-10-18 RX ORDER — ATENOLOL 25 MG/1
TABLET ORAL
COMMUNITY
Start: 2021-10-14 | End: 2021-10-18

## 2021-10-18 RX ORDER — MIRTAZAPINE 15 MG/1
TABLET, FILM COATED ORAL
COMMUNITY
Start: 2021-07-11

## 2021-10-18 RX ORDER — ROSUVASTATIN CALCIUM 5 MG/1
TABLET, COATED ORAL
COMMUNITY
Start: 2021-10-08

## 2021-10-18 RX ORDER — AMLODIPINE BESYLATE 5 MG/1
TABLET ORAL
COMMUNITY
Start: 2021-10-08 | End: 2021-10-18

## 2021-10-18 RX ORDER — ONDANSETRON 4 MG/1
TABLET, FILM COATED ORAL
COMMUNITY
Start: 2021-09-21

## 2021-10-18 RX ORDER — POLYETHYLENE GLYCOL 3350 17 G/17G
17 POWDER, FOR SOLUTION ORAL DAILY
COMMUNITY

## 2021-10-18 RX ORDER — DOXYCYCLINE HYCLATE 100 MG/1
CAPSULE ORAL
COMMUNITY
Start: 2021-08-05

## 2021-10-18 RX ORDER — LISINOPRIL 40 MG/1
TABLET ORAL
COMMUNITY
Start: 2021-10-08 | End: 2021-10-18

## 2021-10-18 ASSESSMENT — ENCOUNTER SYMPTOMS
DIARRHEA: 1
NAUSEA: 1
ABDOMINAL PAIN: 1
CONSTIPATION: 1

## 2021-10-18 NOTE — PROGRESS NOTES
Lilly Olivera (:  1950) is a 69 y.o. female,Established patient, here for evaluation of the following chief complaint(s):  Abdominal Pain (ER follow up for mesenteric artery stenosis/Pt says she is in too much pain to wait for surgery)         ASSESSMENT/PLAN:  1. Chronic mesenteric ischemia [facility])       This is a 59-year-old female patient presents the office today to discuss her abdominal discomfort and wounds related to mesenteric ischemia. She is undergone a mesenteric duplex in the hospital that was fasting. This did demonstrate that she has elevated velocities in both the superior mesenteric and celiac arteries that are hemodynamically significant. The JUDD was unable to be appropriately visualized. She has a dye allergy. I basically went over all of the modalities for evaluating and treating mesenteric ischemia. We discussed that first and foremost is important to rule out any other etiologies. She does have significant elevated velocities in a fasting state that suggest that she has real occlusive disease. This is likely spillover disease from the aorta. Anticipate she has significant occlusive disease that is likely exacerbating her symptoms but may also be the primary cause. She needs to have an upper endoscopy. I recommended that she have this evaluated soon as possible. She also needs undergo CT angiogram to really outline her aortic and also visceral anatomy. From there can be determined whether or not she has a better suited for an endovascular first or even an open approach. She would need nutrition labs and may be on hyperalimentation if her prealbumin and albumin levels are quite low. In the hospital her abdomen was 4.6 so anticipate this is likely normal but either way it is probably best evaluate this once again. She however would like to resume her care back in [place]. I provided her with 2 names should she select a certain institution or one of the other.   She expressed concern to having this treated at [facility].  Understandably therefore recommended she have it treated either at [facility] or at the [facility]. Should she want to stay in [place] would be happy to continue with her care here. Once again a further diagnostic work-up should at least be done first.  Once again I would assume the next step would be an upper endoscopy with possible biopsies and evaluation for recurrent H. pylori and any other occult issues that could be going on. All questions were answered with family present. Subjective   SUBJECTIVE/OBJECTIVE:  This is a 69-year-old female presents the office today for evaluation of mesenteric ischemia. She was seen in the hospital for this. She had presented to the hospital with severe abdominal pain. This is been a longstanding issue. She is from [place]. She recently moved down here with her family members for evaluation for possible treatment of narcotic abuse. Apparently she used heroin in the recent past to treat and numb her abdominal pain. This been a longstanding issue as stated before. She does admit to weight loss, food fear, postprandial pain. However she also does have intermittent bouts of pain that wakes her up in middle the night. She has been treated for H. pylori in the past.  She has not had a recent upper endoscopy. She has not had recent lower endoscopy. She is being evaluated for this at [facility] in [place] but ultimately decided to not use this facility because of some questionable issues from that standpoint. She cannot divulge any further information as her memory appears to be more less suspect at this time. She is present with a family member who is trying to corroborate a lot of this information. She does have a family history of gastric cancer. In terms of the weight loss she is unable to quantify but has been a steady decline.   Most foods that are tolerated on the liquid portion and not significant volume. She does have as she states irritable bowel and discomfort that has been a chronic problem ever since her youth. She also has known diverticulosis with bouts of diverticulitis. Review of Systems   Constitutional: Positive for unexpected weight change. Negative for chills and fever. Gastrointestinal: Positive for abdominal pain, constipation, diarrhea and nausea. Genitourinary: Negative for difficulty urinating. Neurological: Negative for weakness and numbness. Psychiatric/Behavioral: Negative for agitation. Substance abuse          Objective   Physical Exam  Cardiovascular:      Rate and Rhythm: Normal rate and regular rhythm. Pulmonary:      Effort: Pulmonary effort is normal. No respiratory distress. Abdominal:      General: There is no distension. Palpations: Abdomen is soft. There is no mass. Comments: Mild diffuse discomfort with deep palpation   Musculoskeletal:         General: Normal range of motion. Skin:     General: Skin is warm and dry. Capillary Refill: Capillary refill takes less than 2 seconds. Neurological:      General: No focal deficit present. Mental Status: She is alert. Psychiatric:         Mood and Affect: Mood normal.         Behavior: Behavior normal.         Thought Content: Thought content normal.         Judgment: Judgment normal.            On this date 10/18/2021 I have spent 25 minutes reviewing previous notes, test results and face to face with the patient discussing the diagnosis and importance of compliance with the treatment plan as well as documenting on the day of the visit. Alexus Hickey DO, [address] [facility]    This document was dictated using voice recognition software.

## 2021-10-18 NOTE — PROGRESS NOTES
Physician Progress Note      PATIENTBeufisaac Messing  CSN #:                  [id number]  :                       1950  ADMIT DATE:       2021 11:38 AM  DISCH DATE:        10/5/2021 2:08 PM  RESPONDING  PROVIDER #:        Sofi Black MD          QUERY TEXT:    Pt admitted with facial droop. Pt noted to have prior CVA. If possible, please   document in progress notes and discharge summary if you are evaluating and/or   treating any of the following: The medical record reflects the following:  Risk Factors: Prior CVA  HTN  Heroin abuse withdrawal  Clinical Indicators: per Neurology consult \"Patient had reported facial   weakness. She has history of stroke. Can not rule out TIA, could also be   hypertensive in nature vs other. \"   BP on admit 256/85  Treatment: Neurology consult, CT, CTA MRI  and monitoring  Options provided:  -- Facial droop (resolved) as a sequela of prior CVA  -- Other - I will add my own diagnosis  -- Disagree - Not applicable / Not valid  -- Disagree - Clinically unable to determine / Unknown  -- Refer to Clinical Documentation Reviewer    PROVIDER RESPONSE TEXT:    Possible TIA with facial droop, resolved    Query created by: Lisa Moctezuma on 10/15/2021 3:37 PM      QUERY TEXT:    Pt admitted with TIA  Noted documentation of Severe Malnutrition on 10/4/21 by   ordered Dietitian consultant.   If possible, please document in progress notes   and discharge summary:    The medical record reflects the following:  Risk Factors: Heroin abuse withdrawal  Clinical Indicators: Dietitian Consult Malnutrition Status:  Severe   malnutrition  Context:  Chronic Illness  Findings of the 6 clinical characteristics of malnutrition:  Energy Intake:  Mild decrease in energy intake (Comment) (Pt report on EMR   poor intake prior to admission)  Weight Loss:  7 - Greater than 10% over 6 months  Body Fat Loss:  7 - Severe body fat loss Orbital, Buccal region  Muscle Mass Loss:  7 - Severe muscle mass loss Temples (temporalis)  Fluid Accumulation:  No significant fluid accumulation   Strength:  Not Performed? Treatment: Dietitian consult and supplements  Options provided:  -- Severe Malnutrition confirmed present on admission  -- Other - I will add my own diagnosis  -- Disagree - Not applicable / Not valid  -- Disagree - Clinically unable to determine / Unknown  -- Refer to Clinical Documentation Reviewer    PROVIDER RESPONSE TEXT:    The diagnosis of Severe Malnutrition was confirmed as present on admission.     Query created by: Willie Jackson on 10/15/2021 3:41 PM      Electronically signed by:  Adolfo Souza MD 10/18/2021 1:01 PM

## 2021-10-19 NOTE — TELEPHONE ENCOUNTER
Checked cover my meds and this was the response. Case cannot be processed electronically. Your request has been received and will be reviewed manually. You will receive a decision via fax within 72 hours.

## 2021-10-22 ENCOUNTER — TELEPHONE (OUTPATIENT)
Dept: SURGERY | Age: 71
End: 2021-10-22

## 2022-11-26 ENCOUNTER — HOSPITAL ENCOUNTER (INPATIENT)
Age: 72
LOS: 4 days | Discharge: HOME | DRG: 897 | End: 2022-11-30
Attending: STUDENT IN AN ORGANIZED HEALTH CARE EDUCATION/TRAINING PROGRAM | Admitting: INTERNAL MEDICINE
Payer: MEDICARE

## 2022-11-26 ENCOUNTER — APPOINTMENT (OUTPATIENT)
Dept: GENERAL RADIOLOGY | Age: 72
DRG: 897 | End: 2022-11-26
Payer: MEDICARE

## 2022-11-26 ENCOUNTER — APPOINTMENT (OUTPATIENT)
Dept: CT IMAGING | Age: 72
DRG: 897 | End: 2022-11-26
Payer: MEDICARE

## 2022-11-26 DIAGNOSIS — N17.9 AKI (ACUTE KIDNEY INJURY) (HCC): ICD-10-CM

## 2022-11-26 DIAGNOSIS — R55 SYNCOPE AND COLLAPSE: Primary | ICD-10-CM

## 2022-11-26 LAB
ADENOVIRUS BY PCR: NOT DETECTED
ALBUMIN SERPL-MCNC: 4.1 G/DL (ref 3.5–5.2)
ALP BLD-CCNC: 32 U/L (ref 35–104)
ALT SERPL-CCNC: 6 U/L (ref 0–32)
ANION GAP SERPL CALCULATED.3IONS-SCNC: 14 MMOL/L (ref 7–16)
AST SERPL-CCNC: 13 U/L (ref 0–31)
BACTERIA: NORMAL /HPF
BASOPHILS ABSOLUTE: 0.07 E9/L (ref 0–0.2)
BASOPHILS RELATIVE PERCENT: 0.9 % (ref 0–2)
BILIRUB SERPL-MCNC: 0.3 MG/DL (ref 0–1.2)
BILIRUBIN URINE: NEGATIVE
BLOOD, URINE: NEGATIVE
BORDETELLA PARAPERTUSSIS BY PCR: NOT DETECTED
BORDETELLA PERTUSSIS BY PCR: NOT DETECTED
BUN BLDV-MCNC: 32 MG/DL (ref 6–23)
CALCIUM SERPL-MCNC: 9.6 MG/DL (ref 8.6–10.2)
CHLAMYDOPHILIA PNEUMONIAE BY PCR: NOT DETECTED
CHLORIDE BLD-SCNC: 95 MMOL/L (ref 98–107)
CHLORIDE URINE RANDOM: 83 MMOL/L
CHP ED QC CHECK: NORMAL
CLARITY: CLEAR
CO2: 24 MMOL/L (ref 22–29)
COLOR: YELLOW
CORONAVIRUS 229E BY PCR: NOT DETECTED
CORONAVIRUS HKU1 BY PCR: NOT DETECTED
CORONAVIRUS NL63 BY PCR: NOT DETECTED
CORONAVIRUS OC43 BY PCR: NOT DETECTED
CREAT SERPL-MCNC: 2.4 MG/DL (ref 0.5–1)
CREATININE URINE: 41 MG/DL (ref 29–226)
EOSINOPHILS ABSOLUTE: 0.14 E9/L (ref 0.05–0.5)
EOSINOPHILS RELATIVE PERCENT: 1.9 % (ref 0–6)
GFR SERPL CREATININE-BSD FRML MDRD: 21 ML/MIN/1.73
GLUCOSE BLD-MCNC: 119 MG/DL
GLUCOSE BLD-MCNC: 119 MG/DL (ref 74–99)
GLUCOSE URINE: NEGATIVE MG/DL
HCT VFR BLD CALC: 37.9 % (ref 34–48)
HEMOGLOBIN: 12.2 G/DL (ref 11.5–15.5)
HUMAN METAPNEUMOVIRUS BY PCR: NOT DETECTED
HUMAN RHINOVIRUS/ENTEROVIRUS BY PCR: NOT DETECTED
IMMATURE GRANULOCYTES #: 0.03 E9/L
IMMATURE GRANULOCYTES %: 0.4 % (ref 0–5)
INFLUENZA A BY PCR: NOT DETECTED
INFLUENZA B BY PCR: NOT DETECTED
KETONES, URINE: NEGATIVE MG/DL
LEUKOCYTE ESTERASE, URINE: NEGATIVE
LYMPHOCYTES ABSOLUTE: 3.08 E9/L (ref 1.5–4)
LYMPHOCYTES RELATIVE PERCENT: 41.6 % (ref 20–42)
MAGNESIUM: 1.7 MG/DL (ref 1.6–2.6)
MCH RBC QN AUTO: 31.2 PG (ref 26–35)
MCHC RBC AUTO-ENTMCNC: 32.2 % (ref 32–34.5)
MCV RBC AUTO: 96.9 FL (ref 80–99.9)
METER GLUCOSE: 129 MG/DL (ref 74–99)
MONOCYTES ABSOLUTE: 0.45 E9/L (ref 0.1–0.95)
MONOCYTES RELATIVE PERCENT: 6.1 % (ref 2–12)
MYCOPLASMA PNEUMONIAE BY PCR: NOT DETECTED
NEUTROPHILS ABSOLUTE: 3.64 E9/L (ref 1.8–7.3)
NEUTROPHILS RELATIVE PERCENT: 49.1 % (ref 43–80)
NITRITE, URINE: NEGATIVE
PARAINFLUENZA VIRUS 1 BY PCR: NOT DETECTED
PARAINFLUENZA VIRUS 2 BY PCR: NOT DETECTED
PARAINFLUENZA VIRUS 3 BY PCR: NOT DETECTED
PARAINFLUENZA VIRUS 4 BY PCR: NOT DETECTED
PDW BLD-RTO: 12.6 FL (ref 11.5–15)
PH UA: 6 (ref 5–9)
PHOSPHORUS: 3.9 MG/DL (ref 2.5–4.5)
PLATELET # BLD: 414 E9/L (ref 130–450)
PMV BLD AUTO: 9.1 FL (ref 7–12)
POTASSIUM REFLEX MAGNESIUM: 3.6 MMOL/L (ref 3.5–5)
POTASSIUM, UR: 19 MMOL/L
PROTEIN UA: NEGATIVE MG/DL
RBC # BLD: 3.91 E12/L (ref 3.5–5.5)
RBC UA: NORMAL /HPF (ref 0–2)
RESPIRATORY SYNCYTIAL VIRUS BY PCR: NOT DETECTED
SARS-COV-2, PCR: NOT DETECTED
SODIUM BLD-SCNC: 133 MMOL/L (ref 132–146)
SODIUM URINE: 86 MMOL/L
SPECIFIC GRAVITY UA: 1.01 (ref 1–1.03)
TOTAL PROTEIN: 7.7 G/DL (ref 6.4–8.3)
TROPONIN, HIGH SENSITIVITY: 31 NG/L (ref 0–9)
TROPONIN, HIGH SENSITIVITY: 34 NG/L (ref 0–9)
UREA NITROGEN, UR: 247 MG/DL (ref 800–1666)
UROBILINOGEN, URINE: 0.2 E.U./DL
WBC # BLD: 7.4 E9/L (ref 4.5–11.5)
WBC UA: NORMAL /HPF (ref 0–5)

## 2022-11-26 PROCEDURE — 96361 HYDRATE IV INFUSION ADD-ON: CPT

## 2022-11-26 PROCEDURE — 96374 THER/PROPH/DIAG INJ IV PUSH: CPT

## 2022-11-26 PROCEDURE — 82570 ASSAY OF URINE CREATININE: CPT

## 2022-11-26 PROCEDURE — 84540 ASSAY OF URINE/UREA-N: CPT

## 2022-11-26 PROCEDURE — 82436 ASSAY OF URINE CHLORIDE: CPT

## 2022-11-26 PROCEDURE — 84133 ASSAY OF URINE POTASSIUM: CPT

## 2022-11-26 PROCEDURE — 99285 EMERGENCY DEPT VISIT HI MDM: CPT

## 2022-11-26 PROCEDURE — 2580000003 HC RX 258: Performed by: STUDENT IN AN ORGANIZED HEALTH CARE EDUCATION/TRAINING PROGRAM

## 2022-11-26 PROCEDURE — 84300 ASSAY OF URINE SODIUM: CPT

## 2022-11-26 PROCEDURE — 80053 COMPREHEN METABOLIC PANEL: CPT

## 2022-11-26 PROCEDURE — 36415 COLL VENOUS BLD VENIPUNCTURE: CPT

## 2022-11-26 PROCEDURE — 2060000000 HC ICU INTERMEDIATE R&B

## 2022-11-26 PROCEDURE — 83735 ASSAY OF MAGNESIUM: CPT

## 2022-11-26 PROCEDURE — 70450 CT HEAD/BRAIN W/O DYE: CPT

## 2022-11-26 PROCEDURE — 2500000003 HC RX 250 WO HCPCS: Performed by: INTERNAL MEDICINE

## 2022-11-26 PROCEDURE — 6370000000 HC RX 637 (ALT 250 FOR IP): Performed by: INTERNAL MEDICINE

## 2022-11-26 PROCEDURE — 84484 ASSAY OF TROPONIN QUANT: CPT

## 2022-11-26 PROCEDURE — 2500000003 HC RX 250 WO HCPCS: Performed by: STUDENT IN AN ORGANIZED HEALTH CARE EDUCATION/TRAINING PROGRAM

## 2022-11-26 PROCEDURE — 0202U NFCT DS 22 TRGT SARS-COV-2: CPT

## 2022-11-26 PROCEDURE — 6360000002 HC RX W HCPCS

## 2022-11-26 PROCEDURE — 82962 GLUCOSE BLOOD TEST: CPT

## 2022-11-26 PROCEDURE — 2580000003 HC RX 258: Performed by: INTERNAL MEDICINE

## 2022-11-26 PROCEDURE — 71045 X-RAY EXAM CHEST 1 VIEW: CPT

## 2022-11-26 PROCEDURE — 93005 ELECTROCARDIOGRAM TRACING: CPT | Performed by: STUDENT IN AN ORGANIZED HEALTH CARE EDUCATION/TRAINING PROGRAM

## 2022-11-26 PROCEDURE — C9113 INJ PANTOPRAZOLE SODIUM, VIA: HCPCS | Performed by: INTERNAL MEDICINE

## 2022-11-26 PROCEDURE — 84100 ASSAY OF PHOSPHORUS: CPT

## 2022-11-26 PROCEDURE — 81001 URINALYSIS AUTO W/SCOPE: CPT

## 2022-11-26 PROCEDURE — 85025 COMPLETE CBC W/AUTO DIFF WBC: CPT

## 2022-11-26 PROCEDURE — 6360000002 HC RX W HCPCS: Performed by: INTERNAL MEDICINE

## 2022-11-26 RX ORDER — LABETALOL HYDROCHLORIDE 5 MG/ML
10 INJECTION, SOLUTION INTRAVENOUS ONCE
Status: COMPLETED | OUTPATIENT
Start: 2022-11-26 | End: 2022-11-26

## 2022-11-26 RX ORDER — PROMETHAZINE HYDROCHLORIDE 12.5 MG/1
12.5 TABLET ORAL EVERY 6 HOURS PRN
Status: DISCONTINUED | OUTPATIENT
Start: 2022-11-26 | End: 2022-11-30 | Stop reason: HOSPADM

## 2022-11-26 RX ORDER — ACETAMINOPHEN 325 MG/1
650 TABLET ORAL EVERY 6 HOURS PRN
Status: DISCONTINUED | OUTPATIENT
Start: 2022-11-26 | End: 2022-11-30 | Stop reason: HOSPADM

## 2022-11-26 RX ORDER — 0.9 % SODIUM CHLORIDE 0.9 %
1000 INTRAVENOUS SOLUTION INTRAVENOUS ONCE
Status: COMPLETED | OUTPATIENT
Start: 2022-11-26 | End: 2022-11-26

## 2022-11-26 RX ORDER — DICYCLOMINE HYDROCHLORIDE 10 MG/1
20 CAPSULE ORAL EVERY 6 HOURS PRN
Status: DISCONTINUED | OUTPATIENT
Start: 2022-11-26 | End: 2022-11-30 | Stop reason: HOSPADM

## 2022-11-26 RX ORDER — OXYCODONE HYDROCHLORIDE 5 MG/1
5 TABLET ORAL ONCE
Status: COMPLETED | OUTPATIENT
Start: 2022-11-26 | End: 2022-11-26

## 2022-11-26 RX ORDER — POLYETHYLENE GLYCOL 3350 17 G/17G
17 POWDER, FOR SOLUTION ORAL DAILY PRN
Status: DISCONTINUED | OUTPATIENT
Start: 2022-11-26 | End: 2022-11-30 | Stop reason: HOSPADM

## 2022-11-26 RX ORDER — DOXEPIN HYDROCHLORIDE 50 MG/1
50 CAPSULE ORAL NIGHTLY
COMMUNITY

## 2022-11-26 RX ORDER — LISINOPRIL 40 MG/1
40 TABLET ORAL DAILY
Status: ON HOLD | COMMUNITY
End: 2022-11-30 | Stop reason: SDUPTHER

## 2022-11-26 RX ORDER — SODIUM CHLORIDE 0.9 % (FLUSH) 0.9 %
5-40 SYRINGE (ML) INJECTION EVERY 12 HOURS SCHEDULED
Status: DISCONTINUED | OUTPATIENT
Start: 2022-11-26 | End: 2022-11-30 | Stop reason: HOSPADM

## 2022-11-26 RX ORDER — DEXTROSE AND SODIUM CHLORIDE 5; .9 G/100ML; G/100ML
INJECTION, SOLUTION INTRAVENOUS CONTINUOUS
Status: DISCONTINUED | OUTPATIENT
Start: 2022-11-26 | End: 2022-11-28

## 2022-11-26 RX ORDER — BUSPIRONE HYDROCHLORIDE 5 MG/1
5 TABLET ORAL DAILY
COMMUNITY

## 2022-11-26 RX ORDER — NICOTINE 21 MG/24HR
1 PATCH, TRANSDERMAL 24 HOURS TRANSDERMAL DAILY
Status: DISCONTINUED | OUTPATIENT
Start: 2022-11-26 | End: 2022-11-30 | Stop reason: HOSPADM

## 2022-11-26 RX ORDER — ATORVASTATIN CALCIUM 40 MG/1
40 TABLET, FILM COATED ORAL NIGHTLY
Status: DISCONTINUED | OUTPATIENT
Start: 2022-11-26 | End: 2022-11-30 | Stop reason: HOSPADM

## 2022-11-26 RX ORDER — SODIUM CHLORIDE 9 MG/ML
INJECTION, SOLUTION INTRAVENOUS PRN
Status: DISCONTINUED | OUTPATIENT
Start: 2022-11-26 | End: 2022-11-30 | Stop reason: HOSPADM

## 2022-11-26 RX ORDER — INDAPAMIDE 2.5 MG/1
2.5 TABLET, FILM COATED ORAL EVERY MORNING
COMMUNITY
End: 2022-12-08 | Stop reason: SINTOL

## 2022-11-26 RX ORDER — AMLODIPINE BESYLATE 10 MG/1
10 TABLET ORAL NIGHTLY
Status: ON HOLD | COMMUNITY
End: 2022-11-30 | Stop reason: SDUPTHER

## 2022-11-26 RX ORDER — GABAPENTIN 300 MG/1
300 CAPSULE ORAL 3 TIMES DAILY
Status: ON HOLD | COMMUNITY
End: 2022-11-30 | Stop reason: HOSPADM

## 2022-11-26 RX ORDER — HEPARIN SODIUM 10000 [USP'U]/ML
5000 INJECTION, SOLUTION INTRAVENOUS; SUBCUTANEOUS EVERY 8 HOURS SCHEDULED
Status: DISCONTINUED | OUTPATIENT
Start: 2022-11-26 | End: 2022-11-30 | Stop reason: HOSPADM

## 2022-11-26 RX ORDER — GABAPENTIN 300 MG/1
300 CAPSULE ORAL 3 TIMES DAILY
Status: DISCONTINUED | OUTPATIENT
Start: 2022-11-26 | End: 2022-11-30

## 2022-11-26 RX ORDER — LABETALOL HYDROCHLORIDE 5 MG/ML
10 INJECTION, SOLUTION INTRAVENOUS EVERY 6 HOURS PRN
Status: DISCONTINUED | OUTPATIENT
Start: 2022-11-26 | End: 2022-11-30 | Stop reason: HOSPADM

## 2022-11-26 RX ORDER — AMLODIPINE BESYLATE 10 MG/1
10 TABLET ORAL NIGHTLY
Status: DISCONTINUED | OUTPATIENT
Start: 2022-11-26 | End: 2022-11-28

## 2022-11-26 RX ORDER — ACETAMINOPHEN 650 MG/1
650 SUPPOSITORY RECTAL EVERY 6 HOURS PRN
Status: DISCONTINUED | OUTPATIENT
Start: 2022-11-26 | End: 2022-11-30 | Stop reason: HOSPADM

## 2022-11-26 RX ORDER — DEXTROSE MONOHYDRATE 100 MG/ML
INJECTION, SOLUTION INTRAVENOUS CONTINUOUS PRN
Status: DISCONTINUED | OUTPATIENT
Start: 2022-11-26 | End: 2022-11-30 | Stop reason: HOSPADM

## 2022-11-26 RX ORDER — HYDRALAZINE HYDROCHLORIDE 20 MG/ML
10 INJECTION INTRAMUSCULAR; INTRAVENOUS EVERY 6 HOURS PRN
Status: DISCONTINUED | OUTPATIENT
Start: 2022-11-26 | End: 2022-11-30 | Stop reason: HOSPADM

## 2022-11-26 RX ORDER — ONDANSETRON 2 MG/ML
INJECTION INTRAMUSCULAR; INTRAVENOUS
Status: COMPLETED
Start: 2022-11-26 | End: 2022-11-26

## 2022-11-26 RX ORDER — DOXEPIN HYDROCHLORIDE 50 MG/1
50 CAPSULE ORAL NIGHTLY
Status: DISCONTINUED | OUTPATIENT
Start: 2022-11-26 | End: 2022-11-30 | Stop reason: HOSPADM

## 2022-11-26 RX ORDER — SODIUM CHLORIDE 0.9 % (FLUSH) 0.9 %
5-40 SYRINGE (ML) INJECTION PRN
Status: DISCONTINUED | OUTPATIENT
Start: 2022-11-26 | End: 2022-11-30 | Stop reason: HOSPADM

## 2022-11-26 RX ORDER — ONDANSETRON 2 MG/ML
4 INJECTION INTRAMUSCULAR; INTRAVENOUS ONCE
Status: COMPLETED | OUTPATIENT
Start: 2022-11-26 | End: 2022-11-26

## 2022-11-26 RX ADMIN — ATORVASTATIN CALCIUM 40 MG: 40 TABLET, FILM COATED ORAL at 21:02

## 2022-11-26 RX ADMIN — LABETALOL HYDROCHLORIDE 10 MG: 5 INJECTION INTRAVENOUS at 18:50

## 2022-11-26 RX ADMIN — SODIUM CHLORIDE 1000 ML: 9 INJECTION, SOLUTION INTRAVENOUS at 13:35

## 2022-11-26 RX ADMIN — AMLODIPINE BESYLATE 10 MG: 10 TABLET ORAL at 21:02

## 2022-11-26 RX ADMIN — HEPARIN SODIUM 5000 UNITS: 10000 INJECTION INTRAVENOUS; SUBCUTANEOUS at 20:54

## 2022-11-26 RX ADMIN — ACETAMINOPHEN 650 MG: 325 TABLET, FILM COATED ORAL at 20:51

## 2022-11-26 RX ADMIN — OXYCODONE 5 MG: 5 TABLET ORAL at 21:28

## 2022-11-26 RX ADMIN — DOXEPIN HYDROCHLORIDE 50 MG: 50 CAPSULE ORAL at 22:01

## 2022-11-26 RX ADMIN — SODIUM CHLORIDE, PRESERVATIVE FREE 40 MG: 5 INJECTION INTRAVENOUS at 20:50

## 2022-11-26 RX ADMIN — SODIUM CHLORIDE 1000 ML: 9 INJECTION, SOLUTION INTRAVENOUS at 15:45

## 2022-11-26 RX ADMIN — ONDANSETRON 4 MG: 2 INJECTION INTRAMUSCULAR; INTRAVENOUS at 15:06

## 2022-11-26 RX ADMIN — DEXTROSE AND SODIUM CHLORIDE: 5; 900 INJECTION, SOLUTION INTRAVENOUS at 21:13

## 2022-11-26 RX ADMIN — GABAPENTIN 300 MG: 300 CAPSULE ORAL at 21:02

## 2022-11-26 RX ADMIN — LABETALOL HYDROCHLORIDE 10 MG: 5 INJECTION INTRAVENOUS at 20:26

## 2022-11-26 RX ADMIN — DICYCLOMINE HYDROCHLORIDE 20 MG: 10 CAPSULE ORAL at 21:02

## 2022-11-26 RX ADMIN — Medication 10 ML: at 20:53

## 2022-11-26 ASSESSMENT — PAIN SCALES - GENERAL
PAINLEVEL_OUTOF10: 9
PAINLEVEL_OUTOF10: 8
PAINLEVEL_OUTOF10: 9

## 2022-11-26 ASSESSMENT — LIFESTYLE VARIABLES
HOW MANY STANDARD DRINKS CONTAINING ALCOHOL DO YOU HAVE ON A TYPICAL DAY: PATIENT DOES NOT DRINK
HOW OFTEN DO YOU HAVE A DRINK CONTAINING ALCOHOL: NEVER

## 2022-11-26 ASSESSMENT — PAIN - FUNCTIONAL ASSESSMENT: PAIN_FUNCTIONAL_ASSESSMENT: NONE - DENIES PAIN

## 2022-11-26 NOTE — ED NOTES
Asked pt for urine sample; family stated they walked her to the bathroom already. Family made aware of need for urine sample as well and asked to press call light for assistance when needed.      Rosanne Miller RN  11/26/22 8004

## 2022-11-26 NOTE — ED PROVIDER NOTES
Department of Emergency Medicine   ED  Provider Note  Admit Date/RoomTime: 11/26/2022  1:02 PM  ED Room: 4508/4508-A          History of Present Illness:  11/26/22, Time: 1:13 PM EST  Chief Complaint   Patient presents with    Seizures     Patient had seizure activity witnessed by family. Hx of seizure / TIA. Takes Plavix         Anna Nunez is a 66 y.o. female presenting to the ED for syncope, beginning just prior to arrival.  The complaint has been persistent, moderate in severity, and worsened by nothing. The patient is a 67-year-old female who presents to the emergency department complaining of syncopal episode. Patient arrives to the emergency department from her family members house via EMS. Symptoms were sudden onset, persistent, moderate in severity, nothing makes it better or worse. Chief complaint states that seizures and there was questionable seizure-like activity witnessed by family. However, after further description the patient had what appears to be a syncopal episode. She has a history of seizures and TIA per EMS and takes Plavix at home. However, the patient was kind of shaking and then briefly passed out. She is from [place] but is visiting family locally. She is currently not having any symptoms. She denies any headache, blurry vision, double vision, numbness, tingling, unilateral weakness, chest pain, shortness of breath, or other acute symptoms or concerns. Review of Systems:   A complete review of systems was performed and pertinent positives and negatives are stated within HPI, all other systems reviewed and are negative.        --------------------------------------------- PAST HISTORY ---------------------------------------------  Past Medical History:  has a past medical history of Asthma, C[facility]bral [facility] COPD (chronic obstructive pulmonary disease) ([facility] Hypertension, and Seizures ([facility]     Past Surgical History:  has no past surgical history on file. Social History:  reports that she has been smoking. She has been smoking an average of .5 packs per day. She uses smokeless tobacco. She reports that she does not currently use alcohol. She reports current drug use. Drug: Marijuana [place]). Family History: family history is not on file. . Unless otherwise noted, family history is non contributory    The patients home medications have been reviewed. Allergies: Iodine, Moxifloxacin, Nsaids, and Sulfa antibiotics    I have reviewed the past medical history, past surgical history, social history, and family history    ---------------------------------------------------PHYSICAL EXAM--------------------------------------    Constitutional/General: Alert and oriented x3  Head: Normocephalic and atraumatic  Eyes: PERRL, EOMI, sclera non icteric  ENT: Oropharynx clear, handling secretions, no trismus, no asymmetry of the posterior oropharynx or uvular edema  Neck: Supple, full ROM, no stridor, no meningeal signs  Respiratory: Lungs clear to auscultation bilaterally, no wheezes, rales, or rhonchi. Not in respiratory distress  Cardiovascular:  Regular rate. Regular rhythm. No murmurs, no gallops, no rubs. 2+ distal pulses. Equal extremity pulses. Gastrointestinal:  Abdomen Soft, Non tender, Non distended. No rebound, guarding, or rigidity. No pulsatile masses. Musculoskeletal: Moves all extremities x 4. Warm and well perfused, no clubbing, no cyanosis, no edema. Capillary refill <3 seconds  Skin: skin warm and dry. No rashes. Neurologic: GCS 15, no focal deficits, symmetric strength 5/5 in the upper and lower extremities bilaterally  Psychiatric: Normal Affect          -------------------------------------------------- RESULTS -------------------------------------------------  Results are listed below.      LABS: (Lab results interpreted by me)  Results for orders placed or performed during the hospital encounter of 11/26/22 Respiratory Panel, Molecular, with COVID-19 (Restricted: peds pts or suitable admitted adults)    Specimen: Nasopharyngeal; Nasal swab   Result Value Ref Range    Adenovirus by PCR Not Detected Not Detected    Bordetella parapertussis by PCR Not Detected Not Detected    Bordetella pertussis by PCR Not Detected Not Detected    Chlamydophilia pneumoniae by PCR Not Detected Not Detected    Coronavirus 229E by PCR Not Detected Not Detected    Coronavirus HKU1 by PCR Not Detected Not Detected    Coronavirus NL63 by PCR Not Detected Not Detected    Coronavirus OC43 by PCR Not Detected Not Detected    SARS-CoV-2, PCR Not Detected Not Detected    Human Metapneumovirus by PCR Not Detected Not Detected    Human Rhinovirus/Enterovirus by PCR Not Detected Not Detected    Influenza A by PCR Not Detected Not Detected    Influenza B by PCR Not Detected Not Detected    Mycoplasma pneumoniae by PCR Not Detected Not Detected    Parainfluenza Virus 1 by PCR Not Detected Not Detected    Parainfluenza Virus 2 by PCR Not Detected Not Detected    Parainfluenza Virus 3 by PCR Not Detected Not Detected    Parainfluenza Virus 4 by PCR Not Detected Not Detected    Respiratory Syncytial Virus by PCR Not Detected Not Detected   CBC with Auto Differential   Result Value Ref Range    WBC 7.4 4.5 - 11.5 E9/L    RBC 3.91 3.50 - 5.50 E12/L    Hemoglobin 12.2 11.5 - 15.5 g/dL    Hematocrit 37.9 34.0 - 48.0 %    MCV 96.9 80.0 - 99.9 fL    MCH 31.2 26.0 - 35.0 pg    MCHC 32.2 32.0 - 34.5 %    RDW 12.6 11.5 - 15.0 fL    Platelets 674 546 - 058 E9/L    MPV 9.1 7.0 - 12.0 fL    Neutrophils % 49.1 43.0 - 80.0 %    Immature Granulocytes % 0.4 0.0 - 5.0 %    Lymphocytes % 41.6 20.0 - 42.0 %    Monocytes % 6.1 2.0 - 12.0 %    Eosinophils % 1.9 0.0 - 6.0 %    Basophils % 0.9 0.0 - 2.0 %    Neutrophils Absolute 3.64 1.80 - 7.30 E9/L    Immature Granulocytes # 0.03 E9/L    Lymphocytes Absolute 3.08 1.50 - 4.00 E9/L    Monocytes Absolute 0.45 0.10 - 0.95 E9/L Eosinophils Absolute 0.14 0.05 - 0.50 E9/L    Basophils Absolute 0.07 0.00 - 0.20 E9/L   Comprehensive Metabolic Panel w/ Reflex to MG   Result Value Ref Range    Sodium 133 132 - 146 mmol/L    Potassium reflex Magnesium 3.6 3.5 - 5.0 mmol/L    Chloride 95 (L) 98 - 107 mmol/L    CO2 24 22 - 29 mmol/L    Anion Gap 14 7 - 16 mmol/L    Glucose 119 (H) 74 - 99 mg/dL    BUN 32 (H) 6 - 23 mg/dL    Creatinine 2.4 (H) 0.5 - 1.0 mg/dL    Est, Glom Filt Rate 21 >=60 mL/min/1.73    Calcium 9.6 8.6 - 10.2 mg/dL    Total Protein 7.7 6.4 - 8.3 g/dL    Albumin 4.1 3.5 - 5.2 g/dL    Total Bilirubin 0.3 0.0 - 1.2 mg/dL    Alkaline Phosphatase 32 (L) 35 - 104 U/L    ALT 6 0 - 32 U/L    AST 13 0 - 31 U/L   Troponin   Result Value Ref Range    Troponin, High Sensitivity 34 (H) 0 - 9 ng/L   Urinalysis with Microscopic   Result Value Ref Range    Color, UA Yellow Straw/Yellow    Clarity, UA Clear Clear    Glucose, Ur Negative Negative mg/dL    Bilirubin Urine Negative Negative    Ketones, Urine Negative Negative mg/dL    Specific Gravity, UA 1.010 1.005 - 1.030    Blood, Urine Negative Negative    pH, UA 6.0 5.0 - 9.0    Protein, UA Negative Negative mg/dL    Urobilinogen, Urine 0.2 <2.0 E.U./dL    Nitrite, Urine Negative Negative    Leukocyte Esterase, Urine Negative Negative    WBC, UA NONE 0 - 5 /HPF    RBC, UA NONE 0 - 2 /HPF    Bacteria, UA NONE SEEN None Seen /HPF   Troponin   Result Value Ref Range    Troponin, High Sensitivity 31 (H) 0 - 9 ng/L   Magnesium   Result Value Ref Range    Magnesium 1.7 1.6 - 2.6 mg/dL   Phosphorus   Result Value Ref Range    Phosphorus 3.9 2.5 - 4.5 mg/dL   Creatinine, Random Urine   Result Value Ref Range    Creatinine, Ur 41 29 - 226 mg/dL   UREA NITROGEN, URINE   Result Value Ref Range    Urea Nitrogen, Ur 247 (L) 800 - 1666 mg/dL   Urine electrolytes   Result Value Ref Range    Sodium, Ur 86 Not Established mmol/L    Potassium, Ur 19.0 Not Established mmol/L    Chloride 83 Not Established mmol/L   Basic Metabolic Panel   Result Value Ref Range    Sodium 140 132 - 146 mmol/L    Potassium 3.2 (L) 3.5 - 5.0 mmol/L    Chloride 107 98 - 107 mmol/L    CO2 22 22 - 29 mmol/L    Anion Gap 11 7 - 16 mmol/L    Glucose 118 (H) 74 - 99 mg/dL    BUN 24 (H) 6 - 23 mg/dL    Creatinine 1.7 (H) 0.5 - 1.0 mg/dL    Est, Glom Filt Rate 31 >=60 mL/min/1.73    Calcium 8.5 (L) 8.6 - 10.2 mg/dL   Magnesium   Result Value Ref Range    Magnesium 1.7 1.6 - 2.6 mg/dL   Phosphorus   Result Value Ref Range    Phosphorus 3.5 2.5 - 4.5 mg/dL   Troponin   Result Value Ref Range    Troponin, High Sensitivity 27 (H) 0 - 9 ng/L   Troponin   Result Value Ref Range    Troponin, High Sensitivity 25 (H) 0 - 9 ng/L   Troponin   Result Value Ref Range    Troponin, High Sensitivity 24 (H) 0 - 9 ng/L   Vitamin B12 & Folate   Result Value Ref Range    Vitamin B-12 401 211 - 946 pg/mL    Folate 16.4 4.8 - 24.2 ng/mL   TSH   Result Value Ref Range    TSH 1.660 0.270 - 4.200 uIU/mL   POCT glucose   Result Value Ref Range    Glucose 119 mg/dL    QC OK? ok    POCT Glucose   Result Value Ref Range    Meter Glucose 129 (H) 74 - 99 mg/dL   POCT Glucose   Result Value Ref Range    Meter Glucose 122 (H) 74 - 99 mg/dL   EKG 12 Lead   Result Value Ref Range    Ventricular Rate 78 BPM    Atrial Rate 78 BPM    P-R Interval 128 ms    QRS Duration 88 ms    Q-T Interval 418 ms    QTc Calculation (Bazett) 476 ms    P Axis 84 degrees    R Axis 85 degrees    T Axis 83 degrees   ,       RADIOLOGY:  Radiologist interpretation  CT HEAD WO CONTRAST   Final Result   1. There is no acute intracranial abnormality. Specifically, there is no   intracranial hemorrhage. 2. Atrophy and periventricular leukomalacia,   3. Old infarcts within the left basal ganglia and left thalamus. XR CHEST PORTABLE   Final Result   No acute process.                  ------------------------- NURSING NOTES AND VITALS REVIEWED ---------------------------   The nursing notes within the ED encounter and vital signs as below have been reviewed by myself  BP (!) 108/45   Pulse 70   Temp 98.1 °F (36.7 °C) (Axillary)   Resp 16   SpO2 98%     Oxygen Saturation Interpretation: Normal    The patients available past medical records and past encounters were reviewed.         ------------------------------ ED COURSE/MEDICAL DECISION MAKING----------------------  Medications   labetalol (NORMODYNE;TRANDATE) injection 10 mg (10 mg IntraVENous Given 11/26/22 2026)   sodium chloride flush 0.9 % injection 5-40 mL (10 mLs IntraVENous Given 11/27/22 0756)   sodium chloride flush 0.9 % injection 5-40 mL (has no administration in time range)   0.9 % sodium chloride infusion (has no administration in time range)   polyethylene glycol (GLYCOLAX) packet 17 g (has no administration in time range)   acetaminophen (TYLENOL) tablet 650 mg (650 mg Oral Given 11/26/22 2051)     Or   acetaminophen (TYLENOL) suppository 650 mg ( Rectal See Alternative 11/26/22 2051)   promethazine (PHENERGAN) tablet 12.5 mg (has no administration in time range)   pantoprazole (PROTONIX) 40 mg in sodium chloride (PF) 0.9 % 10 mL injection (40 mg IntraVENous Given 11/27/22 0756)   heparin (porcine) injection 5,000 Units (5,000 Units SubCUTAneous Given 11/27/22 0540)   nicotine (NICODERM CQ) 14 MG/24HR 1 patch (1 patch TransDERmal Patch Applied 11/27/22 0754)   dextrose 5 % and 0.9 % sodium chloride infusion ( IntraVENous New Bag 11/27/22 0835)   doxepin (SINEQUAN) capsule 50 mg (50 mg Oral Given 11/26/22 2201)   dicyclomine (BENTYL) capsule 20 mg (20 mg Oral Given 11/26/22 2102)   amLODIPine (NORVASC) tablet 10 mg ( Oral Automatically Held 12/2/22 2100)   gabapentin (NEURONTIN) capsule 300 mg (300 mg Oral Given 11/27/22 0806)   atorvastatin (LIPITOR) tablet 40 mg (40 mg Oral Given 11/26/22 2102)   hydrALAZINE (APRESOLINE) injection 10 mg (has no administration in time range)   trimethobenzamide (TIGAN) injection 200 mg (has no administration in time range)   glucose chewable tablet 16 g (has no administration in time range)   dextrose bolus 10% 125 mL (has no administration in time range)     Or   dextrose bolus 10% 250 mL (has no administration in time range)   glucagon (rDNA) injection 1 mg (has no administration in time range)   dextrose 10 % infusion (has no administration in time range)   0.9 % sodium chloride bolus (500 mLs IntraVENous New Bag 11/27/22 1057)   0.9 % sodium chloride bolus (0 mLs IntraVENous Stopped 11/26/22 1431)   ondansetron (ZOFRAN) injection 4 mg (4 mg IntraVENous Given 11/26/22 1506)   0.9 % sodium chloride bolus (0 mLs IntraVENous Stopped 11/26/22 1655)   labetalol (NORMODYNE;TRANDATE) injection 10 mg (10 mg IntraVENous Given 11/26/22 1850)   oxyCODONE (ROXICODONE) immediate release tablet 5 mg (5 mg Oral Given 11/26/22 2128)   potassium bicarb-citric acid (EFFER-K) effervescent tablet 40 mEq (40 mEq Oral Given 11/27/22 0754)   0.9 % sodium chloride bolus (0 mLs IntraVENous Stopped 11/27/22 0835)              IDr. Naa, am the primary provider of record    Medical Decision Making:   The patient is a 69-year-old female presents emergency department for syncope. She is hemodynamically stable, nontoxic, and in no acute distress. There are no focal neurological deficits. Patient was found of JOHN with an elevated creatinine over 2 and her baseline is 0.8. Patient was treated with IV fluids and admitted to medicine for further treatment. While not exhaustive, the following diagnoses were considered: JOHN versus electrolyte normality versus ACS versus intracranial hemorrhage versus TIA versus seizure      Oxygen Saturation Interpretation: 98 % on room air. Re-Evaluations:  ED Course as of 11/27/22 1108   Sat Nov 26, 2022   1722 Consulted with Dr. Len Valverde, house med, who accepted for admission. [KG]   1910 EKG: This EKG is signed and interpreted by me.     Rate: 78  Rhythm: Sinus  Interpretation: Normal sinus rhythm, normal axis, no acute ST elevations or depressions, nonspecific ST changes throughout, left ventricular perjury, QTC is 476  Comparison: no previous EKG available    [KG]      ED Course User Index  [KG] Keyla EwingDO           This patient's ED course included: a personal history and physicial examination, re-evaluation prior to disposition, multiple bedside re-evaluations, IV medications, cardiac monitoring, continuous pulse oximetry, and complex medical decision making and emergency management    This patient has remained hemodynamically stable during their ED course. Consultations:  Spoke with Dr. Yovanny Abarca (Medicine). Discussed case. They will admit this patient. Counseling: The emergency provider has spoken with the patient and discussed todays results, in addition to providing specific details for the plan of care and counseling regarding the diagnosis and prognosis. Questions are answered at this time and they are agreeable with the plan.       --------------------------------- IMPRESSION AND DISPOSITION ---------------------------------    IMPRESSION  1. Syncope and collapse    2. JOHN (acute kidney injury) ([facility]        DISPOSITION  Disposition: Admit to telemetry  Patient condition is stable        NOTE: This report was transcribed using voice recognition software.  Every effort was made to ensure accuracy; however, inadvertent computerized transcription errors may be present       Keyla Ewing   11/27/22 1108

## 2022-11-27 LAB
ANION GAP SERPL CALCULATED.3IONS-SCNC: 11 MMOL/L (ref 7–16)
BUN BLDV-MCNC: 24 MG/DL (ref 6–23)
CALCIUM SERPL-MCNC: 8.5 MG/DL (ref 8.6–10.2)
CHLORIDE BLD-SCNC: 107 MMOL/L (ref 98–107)
CO2: 22 MMOL/L (ref 22–29)
CREAT SERPL-MCNC: 1.7 MG/DL (ref 0.5–1)
FOLATE: 16.4 NG/ML (ref 4.8–24.2)
GFR SERPL CREATININE-BSD FRML MDRD: 31 ML/MIN/1.73
GLUCOSE BLD-MCNC: 118 MG/DL (ref 74–99)
MAGNESIUM: 1.7 MG/DL (ref 1.6–2.6)
METER GLUCOSE: 122 MG/DL (ref 74–99)
PHOSPHORUS: 3.5 MG/DL (ref 2.5–4.5)
POTASSIUM SERPL-SCNC: 3.2 MMOL/L (ref 3.5–5)
SODIUM BLD-SCNC: 140 MMOL/L (ref 132–146)
TROPONIN, HIGH SENSITIVITY: 23 NG/L (ref 0–9)
TROPONIN, HIGH SENSITIVITY: 24 NG/L (ref 0–9)
TROPONIN, HIGH SENSITIVITY: 25 NG/L (ref 0–9)
TROPONIN, HIGH SENSITIVITY: 27 NG/L (ref 0–9)
TSH SERPL DL<=0.05 MIU/L-ACNC: 1.66 UIU/ML (ref 0.27–4.2)
VITAMIN B-12: 401 PG/ML (ref 211–946)

## 2022-11-27 PROCEDURE — 36415 COLL VENOUS BLD VENIPUNCTURE: CPT

## 2022-11-27 PROCEDURE — 99232 SBSQ HOSP IP/OBS MODERATE 35: CPT | Performed by: INTERNAL MEDICINE

## 2022-11-27 PROCEDURE — 6360000002 HC RX W HCPCS: Performed by: INTERNAL MEDICINE

## 2022-11-27 PROCEDURE — 6370000000 HC RX 637 (ALT 250 FOR IP)

## 2022-11-27 PROCEDURE — 6370000000 HC RX 637 (ALT 250 FOR IP): Performed by: INTERNAL MEDICINE

## 2022-11-27 PROCEDURE — 84443 ASSAY THYROID STIM HORMONE: CPT

## 2022-11-27 PROCEDURE — 6370000000 HC RX 637 (ALT 250 FOR IP): Performed by: STUDENT IN AN ORGANIZED HEALTH CARE EDUCATION/TRAINING PROGRAM

## 2022-11-27 PROCEDURE — 84484 ASSAY OF TROPONIN QUANT: CPT

## 2022-11-27 PROCEDURE — 82746 ASSAY OF FOLIC ACID SERUM: CPT

## 2022-11-27 PROCEDURE — 80048 BASIC METABOLIC PNL TOTAL CA: CPT

## 2022-11-27 PROCEDURE — 82607 VITAMIN B-12: CPT

## 2022-11-27 PROCEDURE — 2580000003 HC RX 258: Performed by: INTERNAL MEDICINE

## 2022-11-27 PROCEDURE — 2060000000 HC ICU INTERMEDIATE R&B

## 2022-11-27 PROCEDURE — 84100 ASSAY OF PHOSPHORUS: CPT

## 2022-11-27 PROCEDURE — 82962 GLUCOSE BLOOD TEST: CPT

## 2022-11-27 PROCEDURE — 83735 ASSAY OF MAGNESIUM: CPT

## 2022-11-27 PROCEDURE — 93005 ELECTROCARDIOGRAM TRACING: CPT | Performed by: INTERNAL MEDICINE

## 2022-11-27 PROCEDURE — C9113 INJ PANTOPRAZOLE SODIUM, VIA: HCPCS | Performed by: INTERNAL MEDICINE

## 2022-11-27 PROCEDURE — A4216 STERILE WATER/SALINE, 10 ML: HCPCS | Performed by: INTERNAL MEDICINE

## 2022-11-27 PROCEDURE — 2580000003 HC RX 258: Performed by: STUDENT IN AN ORGANIZED HEALTH CARE EDUCATION/TRAINING PROGRAM

## 2022-11-27 RX ORDER — 0.9 % SODIUM CHLORIDE 0.9 %
500 INTRAVENOUS SOLUTION INTRAVENOUS ONCE
Status: COMPLETED | OUTPATIENT
Start: 2022-11-27 | End: 2022-11-27

## 2022-11-27 RX ORDER — GABAPENTIN 300 MG/1
300 CAPSULE ORAL ONCE
Status: COMPLETED | OUTPATIENT
Start: 2022-11-27 | End: 2022-11-27

## 2022-11-27 RX ORDER — GUAIFENESIN 400 MG/1
400 TABLET ORAL ONCE
Status: COMPLETED | OUTPATIENT
Start: 2022-11-27 | End: 2022-11-27

## 2022-11-27 RX ADMIN — DEXTROSE AND SODIUM CHLORIDE: 5; 900 INJECTION, SOLUTION INTRAVENOUS at 05:45

## 2022-11-27 RX ADMIN — HEPARIN SODIUM 5000 UNITS: 10000 INJECTION INTRAVENOUS; SUBCUTANEOUS at 14:57

## 2022-11-27 RX ADMIN — SODIUM CHLORIDE, PRESERVATIVE FREE 40 MG: 5 INJECTION INTRAVENOUS at 07:56

## 2022-11-27 RX ADMIN — ACETAMINOPHEN 650 MG: 325 TABLET, FILM COATED ORAL at 14:59

## 2022-11-27 RX ADMIN — ATORVASTATIN CALCIUM 40 MG: 40 TABLET, FILM COATED ORAL at 20:03

## 2022-11-27 RX ADMIN — SODIUM CHLORIDE 500 ML: 9 INJECTION, SOLUTION INTRAVENOUS at 10:57

## 2022-11-27 RX ADMIN — GABAPENTIN 300 MG: 300 CAPSULE ORAL at 22:10

## 2022-11-27 RX ADMIN — GABAPENTIN 300 MG: 300 CAPSULE ORAL at 14:57

## 2022-11-27 RX ADMIN — SODIUM CHLORIDE 500 ML: 9 INJECTION, SOLUTION INTRAVENOUS at 07:46

## 2022-11-27 RX ADMIN — HEPARIN SODIUM 5000 UNITS: 10000 INJECTION INTRAVENOUS; SUBCUTANEOUS at 05:40

## 2022-11-27 RX ADMIN — GABAPENTIN 300 MG: 300 CAPSULE ORAL at 08:06

## 2022-11-27 RX ADMIN — GUAIFENESIN 400 MG: 400 TABLET ORAL at 18:07

## 2022-11-27 RX ADMIN — POTASSIUM BICARBONATE 40 MEQ: 782 TABLET, EFFERVESCENT ORAL at 07:54

## 2022-11-27 RX ADMIN — DEXTROSE AND SODIUM CHLORIDE: 5; 900 INJECTION, SOLUTION INTRAVENOUS at 08:35

## 2022-11-27 RX ADMIN — Medication 10 ML: at 07:56

## 2022-11-27 RX ADMIN — DOXEPIN HYDROCHLORIDE 50 MG: 50 CAPSULE ORAL at 20:03

## 2022-11-27 RX ADMIN — GABAPENTIN 300 MG: 300 CAPSULE ORAL at 20:03

## 2022-11-27 RX ADMIN — Medication 10 ML: at 20:04

## 2022-11-27 RX ADMIN — ACETAMINOPHEN 650 MG: 325 TABLET, FILM COATED ORAL at 20:07

## 2022-11-27 RX ADMIN — DICYCLOMINE HYDROCHLORIDE 20 MG: 10 CAPSULE ORAL at 23:12

## 2022-11-27 RX ADMIN — HEPARIN SODIUM 5000 UNITS: 10000 INJECTION INTRAVENOUS; SUBCUTANEOUS at 20:19

## 2022-11-27 RX ADMIN — CAMPHOR, MENTHOL: 90; 13 OINTMENT TOPICAL at 17:49

## 2022-11-27 RX ADMIN — TRIMETHOBENZAMIDE HYDROCHLORIDE 200 MG: 100 INJECTION INTRAMUSCULAR at 20:19

## 2022-11-27 ASSESSMENT — PAIN SCALES - GENERAL: PAINLEVEL_OUTOF10: 9

## 2022-11-27 NOTE — PLAN OF CARE
Problem: Safety - Adult  Goal: Free from fall injury  Outcome: Progressing  Flowsheets (Taken 11/27/2022 1417)  Free From Fall Injury: Instruct family/caregiver on patient safety

## 2022-11-27 NOTE — CARE COORDINATION
SOCIAL WORK/CASEMANAGEMENT TRANSITION OF CARE Slick Delfin Topete, [address]): I attempted to assess pt but was not able to make sense of her at times. I called son, Jose Palacio, twice and finally got ahold of him. Pt lives with Jose Palacio who is not working and drives. 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 Pt has 4 grown children. Eliezer and pt lives in [place] in a apt with no steps to the 1st floor. No c pta. Pt has a cane, fww, shower chair and 3:1 commode. She is normally per son independent with bathing and dressing, her own meds and tries to cook but eliezer does it. Pt no longer drives. Belen Chilango said pt falls often due to drugs and refuses to go to rehab for it and jose. His plan he said is home but he would like her to stay with family away from their home for a short time to get her away from the people she does drugs with. He has a son in [address] that pt may stay with or daughter in law , Azalia Sun. Will have ot follow up with eliezer on discharge to find out destination.  CAYDEN Mercado  11/27/2022

## 2022-11-27 NOTE — PROGRESS NOTES
[facility]  Internal Medicine Residency Program  Progress Note - House Team     Patient:  Marco Nguyen 66 y.o. female MRN: [id number]     Date of Service: 11/27/2022     CC: presyncope/fall    Days since admission: 1    Subjective     Overnight events: pt was admitted overnight being orthostatic positive, got 2 L of NS, BP went up to 200s, after receiving labetalol and amlodipine, BP 93/38,on D5, Ns, and 500cc bolus NS. Pt was complaining on headache overnight, given oxycodone once. Pt doesn't have teeth for a year, and stopped eating as usual since October. Objective     Physical Exam:  Vitals: BP (!) 93/38   Pulse 75   Temp 99 °F (37.2 °C) (Oral)   Resp 16   SpO2 99%     I & O - 24hr:   Intake/Output Summary (Last 24 hours) at 11/27/2022 9418  Last data filed at 11/26/2022 1756  Gross per 24 hour   Intake --   Output 200 ml   Net -200 ml      General Appearance: alert, appears stated age, and cooperative  HEENT:  Head: Normocephalic, no lesions, without obvious abnormality. Neck: no adenopathy  Lung: clear to auscultation bilaterally  Heart: regular rate and rhythm, S1, S2 normal, no murmur, click, rub or gallop  Abdomen: soft, non-tender; bowel sounds normal; no masses,  no organomegaly  Extremities:  extremities normal, atraumatic, no cyanosis or edema  Musculokeletal: No joint swelling, no muscle tenderness. ROM normal in all joints of extremities.    Neurologic: Mental status: Alert, oriented, thought content appropriate  Subject  Pertinent Information & Imaging Studies, Consults   martha  CBC with Differential:    Lab Results   Component Value Date/Time    WBC 7.4 11/26/2022 01:17 PM    RBC 3.91 11/26/2022 01:17 PM    HGB 12.2 11/26/2022 01:17 PM    HCT 37.9 11/26/2022 01:17 PM     11/26/2022 01:17 PM    MCV 96.9 11/26/2022 01:17 PM    MCH 31.2 11/26/2022 01:17 PM    MCHC 32.2 11/26/2022 01:17 PM    RDW 12.6 11/26/2022 01:17 PM    LYMPHOPCT 41.6 11/26/2022 01:17 PM    MONOPCT 11/26/2022  No acute process. PROCEDURES:  none    FLUIDS: D5  cc/hr      Notable Cultures:      Blood cultures No results found for: BC  Respiratory cultures No results found for: RESPCULTURE No results found for: LABGRAM  Urine No results found for: LABURIN  Legionella No results found for: LABLEGI  C Diff PCR No results found for: CDIFPCR  Wound culture/abscess: No results for input(s): WNDABS in the last 72 hours. Tip culture:No results for input(s): CXCATHTIP in the last 72 hours. Antibiotic  Days  Day started   none                               OXYGENATION: none    DIET: adult regular diet        Resident's Assessment and Plan     Reena Marie is a 66 y.o. female with  has a past medical history of Asthma, Cerebral artery occlusion with cerebral infarction ([facility] [address] COPD (chronic obstructive pulmonary disease) ([facility] [address] Hypertension, and Seizures ([facility] [address] came here with CC   Chief Complaint   Patient presents with    Seizures     Patient had seizure activity witnessed by family. Hx of seizure / TIA. Takes Plavix           Days since admission: 1    Consults:   none    Assessment:  Pre-syncope likely secondary to dehydration? Orthostatic positive  Pt got labetalol and amlodipine due to BP in 200s, now /55,   Plan:  Hold antihypertensive medications   cc bolus  Continue D5 and NS 125cc/hr  Monitor BG q6    2. Status post hypertensive urgency  Pt got labetalol and amlodipine due to BP in 200s, now /55,   Plan:  Hold antihypertensive medications   cc bolus  Continue D5 and NS 125cc/hr  Monitor BG q6    3. Elevated troponin, most likely demand ischemia, trending down    4. JOHN stage I, most likely pre-renal, on CKD 31-3b, baseline creatinine 1.4-1.6  Creatinine 1.7, improving  FeUrea 45.2%, intrinsic  Plan:   cc bolus  Continue D5 and NS 125cc/hr    5. Hx of CVA, lMCA with L sided weakness in upper and lower extrmeities, stable  6.  Hx of ALFREDO saccular aneurysm, stable  7. Hx of neuropathy, on gabapentin  8. Hx of chronic SMA occlusion, s/p stent placement  plan:  Bentyl PRN  Continue lipitor  9. Hx of chronic abdominal pain likely 2/2 stenosis of SMA vs GERD? Plan:  Start oral Pantoprazole   10.hx of tobacco use  11. Hx of opioid use  12. Hx of depression  13. Hypokalemia, supplemented  Plan:  Repeat BMP    PT/OT: not indicated  DVT ppx: heparin 5000 Un TID  GI ppx: protonix      Next of Kin/ POA:none    Code Status: full    Gaston Newton MD, PGY-1  Attending physician: Dr. Hardy Vicente  NOTE: This report was transcribed using voice recognition software. Every effort was made to ensure accuracy; however, inadvertent computerized transcription errors may be present. [facility]    Attending Physician Statement:  Melodie Lezama M.D., F.A.C.P. I have discussed the case, including pertinent history and exam findings with the resident/NP. I have seen and examined the patient and the key elements of the encounter have been performed by me. I agree with the resident ROS, PMHx, PSHx, meds reviewed and assessment, plan and orders as documented by the resident/NP      Hospital charts reviewed, including other providers notes, relevant labs and imaging. Hx of CVA  Hx of seizure  - but doubt active  But likely came in with syncopal episode  Dec PO intake (edentulous, no teeth)  - she went to bathroom- ?passed out, vague  +orthostatics in ER  Bp labile, sp fluids and antihypertensives given- sp labetolol and amlodipine and BP dropped out.  --likely hasn't been taking meds at home  - med refils on 5th and 11/11- urgent  -- DC indapamide- hctz    JOHN 2 to dehydration  - cr. 1.7 from 2.4-- hx of ckd 1.4-1.6- -- 0.8 one year ago  2nd fluid bolus    Dyphagia diet- will need outpatient denture fitting?   Hx of polysubstance use in past--\"pt is a drug addict and still uses heroine\"  Also risk of use PTA contributing:    Recheck orthostatics if negative- DC soon- but placement  But placement issue per   \"Eliezer said pt falls often due to drugs and refuses to go to rehab for it and jose. His plan he said is home but he would like her to stay with family away from their home for a short time to get her away from the people she does drugs with. He has a son in [id number] that pt may stay with or daughter in law , guerita\"  >50% of time spent coordinating care with other providers and/or counseling patient/family  Remainder of medical problems as per resident note.

## 2022-11-27 NOTE — PLAN OF CARE
Patient complaining of nausea, with associated R sided headache, throbbing in quality 7/10 behind the eyes. No neurologic deficit, visual changes, numbness, or tingling  noted. BP down to 184/77 HR 71. Tigan prn on board. Will give oxycodone once for headache.

## 2022-11-27 NOTE — ACP (ADVANCE CARE PLANNING)
Advance Care Planning   Healthcare Decision Maker:    Primary Decision Maker: Danita Enrique Carter - [phone]    Click here to complete Healthcare Decision Makers including selection of the Healthcare Decision Maker Relationship (ie \"Primary\").

## 2022-11-27 NOTE — PROGRESS NOTES
Patient is complaining of chest pain that is sharp and radiating down her leg. EKG was done and vitals. 0-10 pain is 7. Perfect serv sent to IM Team 2. STAT Labs called.

## 2022-11-27 NOTE — H&P
[facility]  Internal Medicine Residency Program  History and Physical    Patient:  Anai Chris 66 y.o. female MRN: [id number]     Date of Service: 11/26/2022    Hospital Day: 1      Chief complaint: had concerns including Seizures (Patient had seizure activity witnessed by family. Hx of seizure / TIA. Takes Plavix ). History of Present Illness     The patient is a 66 y.o. female with a past medical history of hypertension, asthma, COPD, polysubstance abuse, CVA (2021), PFO/ASD (2021), celiac/mesenteric stenosis s/p stent placement and seizure who presented to the ED for concerns of seizure-like activity witnessed by family. Patient noticed she lost his appetite about 4 days ago which was associated with generalized body weakness and malaise. She also noticed worsening lightheadedness with positional dizziness especially when standing for a while making her wanting to sit down always. She could not remember details of the events leading to her presenting to the ED but she could remember going to the bathroom and possibly passing out no report of falling or hitting her head on any surface. She was assisted by her granddaughter who brought her to the ED for evaluation. She also reported history of nausea and diarrhea with the belching. However, patient denies any fever, cough, recent travel or sick contacts. She has on and off chronic abdominal pain which started since her diagnosis of mesenteric stenosis. Patient also described a similar presentation about 1 month ago leading to a fall but did not come to the hospital for evaluation. Patient is a current smoker, smokes about 1 pack in 2 and half days. She denies illicit drug use or alcohol abuse. She claims being compliant on her routine home medications including indapamide, amlodipine, lisinopril, Lipitor, Plavix and inhaler as well as gabapentin.   Past MRI 10/2/2021 was remarkable for moderate microvascular ischemic disease with chronic left basal ganglia and lacunar infarcts as well as saccular aneurysm in in the anterior communicating artery. In the ED, patient was hemodynamically stable on room air with elevated blood pressure up to 200s/100s. Initial laboratory work-up was remarkable for creatinine level elevation 2.4 (baseline 0.8-0.9), troponin elevation 34> 31 with unremarkable UA. CT head showed atrophy and periventricular leukomalacia with old infarcts in left basal ganglia and left thalamus, no acute intracranial process reported. Patient received IV labetalol 10 mg x 2 doses and was admitted to the medical team for further care. Past Medical History:      Diagnosis Date    Asthma     Cerebral artery occlusion with cerebral infarction [facility])     COPD (chronic obstructive pulmonary disease) ([facility])     Hypertension     Seizures ([facility] [address]        Past Surgical History:    History reviewed. No pertinent surgical history. Medications Prior to Admission:    Prior to Admission medications    Medication Sig Start Date End Date Taking? Authorizing Provider   gabapentin (NEURONTIN) 300 MG capsule Take 300 mg by mouth 3 times daily.    Yes Historical Provider, MD   indapamide (LOZOL) 2.5 MG tablet Take 2.5 mg by mouth every morning   Yes Historical Provider, MD   lisinopril (PRINIVIL;ZESTRIL) 40 MG tablet Take 40 mg by mouth daily   Yes Historical Provider, MD   amLODIPine (NORVASC) 10 MG tablet Take 10 mg by mouth nightly   Yes Historical Provider, MD   busPIRone (BUSPAR) 5 MG tablet Take 5 mg by mouth daily   Yes Historical Provider, MD   doxepin (SINEQUAN) 50 MG capsule Take 50 mg by mouth nightly   Yes Historical Provider, MD   dicyclomine (BENTYL) 20 MG tablet Take 1 tablet by mouth every 6 hours as needed (Abdominal Cramping) 10/14/21   Douglas Ott MD   albuterol sulfate HFA (PROVENTIL;VENTOLIN;PROAIR) 108 (90 Base) MCG/ACT inhaler Inhale 2 puffs into the lungs every 6 hours as needed for Wheezing    Historical Provider, MD pantoprazole (PROTONIX) 40 MG tablet Take 40 mg by mouth daily    Historical Provider, MD   ondansetron (ZOFRAN-ODT) 4 MG disintegrating tablet Take 4 mg by mouth every 8 hours as needed for Nausea or Vomiting    Historical Provider, MD       Allergies:  Iodine, Moxifloxacin, Nsaids, and Sulfa antibiotics    Social History:   TOBACCO:   reports that she has been smoking. She has been smoking an average of .5 packs per day. She uses smokeless tobacco.  ETOH:   reports that she does not currently use alcohol. Family History:   History reviewed. No pertinent family history. REVIEW OF SYSTEMS:    Constitutional: Positive for chills, weight loss, poor appetite  HEENT: No blurred vision, no ear problems, no sore throat, no rhinorrhea. Respiratory: Positive for exertional dyspnea, no cough, no sputum production, no pleuritic chest pain  Cardiology: No angina, +dyspnea on exertion, no paroxysmal nocturnal dyspnea, no orthopnea, no palpitation, no leg swelling. Gastroenterology: Positive for abdominal pain, nausea, diarrhea.   No dysphagia, reflux, hematochezia   Genitourinary: No dysuria, no frequency, hesitancy; no hematuria  Musculoskeletal: no joint pain, no myalgia, residual right-sided weakness  Neurology: no focal weakness in extremities, no slurred speech, no double vision, no some numbness in feet  Endocrinology: no temperature intolerance, no polyphagia, polydipsia or polyuria  Hematology: no increased bleeding, no bruising, no lymphadenopathy  Skin: no skin changes noticed by patient  Psychology: no depressed mood, no suicidal ideation    Physical Exam   Vitals: BP (!) 208/79   Pulse 74   Temp 99 °F (37.2 °C) (Oral)   Resp 18   SpO2 99%     General Appearance: alert and oriented to person, place and time, well developed and well- nourished, in no acute distress  Skin: warm and dry, no rash or erythema  Head: normocephalic and atraumatic  Eyes: pupils equal, round, and reactive to light, extraocular eye movements intact, conjunctivae normal  Neck: supple and non-tender without mass, no thyromegaly or thyroid nodules, no cervical lymphadenopathy  Pulmonary/Chest: Mild wheezing, R>L  Cardiovascular: normal rate, regular rhythm, normal S1 and S2, no murmurs, rubs, clicks, or gallops, distal pulses intact, no carotid bruits  Abdomen: soft, non-tender, non-distended, normal bowel sounds, no masses or organomegaly  Extremities: no cyanosis, clubbing or edema  Musculoskeletal: no joint swelling, deformity or tenderness  Neurologic: AOx3 l    Labs and Imaging Studies   Basic Labs  Recent Labs     11/26/22  1317 11/26/22  1529     --    K 3.6  --    CL 95*  --    CO2 24  --    BUN 32*  --    CREATININE 2.4*  --    GLUCOSE 119* 119   CALCIUM 9.6  --        Recent Labs     11/26/22  1317   WBC 7.4   RBC 3.91   HGB 12.2   HCT 37.9   MCV 96.9   MCH 31.2   MCHC 32.2   RDW 12.6      MPV 9.1       Imaging Studies:  CT HEAD WO CONTRAST   Final Result   1. There is no acute intracranial abnormality. Specifically, there is no   intracranial hemorrhage. 2. Atrophy and periventricular leukomalacia,   3. Old infarcts within the left basal ganglia and left thalamus. XR CHEST PORTABLE   Final Result   No acute process. EKG: normal sinus rhythm, unchanged from previous tracings.     Resident's Assessment and Plan     Assessment and Plan:    Recurrent presyncope episode likely orthostatic r/o cardiogenic origin vs viral infection  Patient passed out while going to the bathroom with poor recollection of events  Poor oral intake in the recent past 4 days  Positive orthostatic BP in ED  Fall precautions  Fluid resuscitation  Obtain respiratory viral panel  Obtain UDS, serum drug screen  Monitor blood glucose every 6 hours  Telemetry monitoring  Consider event monitor for arrhythmia given recurrent history    Hypertensive urgency  Elevated BP at presentation, 200s/100s  Intermittent headache reported  Labetalol and hydralazine as needed for BP elevation, goal -160  Monitor BP closely    JOHN stage III likely hemodynamically mediated (dehydration)  Poor oral intake for last 4 days  Cr level elevated 2.4 (baseline 0.8-0.9)  Obtain urine studies/electrolytes  Strict I&O every shift  Monitor BMP/magnesium/phosphorus daily    Elevated troponin likely demand ischemia  Troponin 34> 31  Repeat troponin  -----------------------------------------------------------------------------------------------------------------------------------------------------------------------------------------  Hx CVA L MCA, with L sided weakness upper > lower, walks with a cane at home at baseline  Hx ALFREDO saccular aneurysm (4mm) on MRI 10/21/21, not noted on Head CT in this admission  Hx neuropathy, on gabapentin  Hx chronic SMA occlusive disease s/p SMA stent placement 12/22/21  Hx chronic abdominal pain likely 2/2 mesenteric stenosis  GERD?EGD/[facility] 5 yrs ago at [facility] result  Hx tobacco abuse, nicotine patch ordered  Hx opioid abuse, follow UDS  Hx depression      PT/OT evaluation: Order warranted  DVT prophylaxis/ GI prophylaxis: Lovenox/Protonix  Disposition: admit to floor    Pete Olivier MD, PGY-1  Attending physician: Dr. Vita Coto  Case discussed with: Dr. Pastor Joseph

## 2022-11-28 LAB
AMPHETAMINE SCREEN, URINE: NOT DETECTED
AMPHETAMINE SCREEN, URINE: NOT DETECTED
ANION GAP SERPL CALCULATED.3IONS-SCNC: 8 MMOL/L (ref 7–16)
B.E.: -3.1 MMOL/L (ref -3–3)
BARBITURATE SCREEN URINE: NOT DETECTED
BARBITURATE SCREEN URINE: NOT DETECTED
BENZODIAZEPINE SCREEN, URINE: NOT DETECTED
BENZODIAZEPINE SCREEN, URINE: NOT DETECTED
BUN BLDV-MCNC: 20 MG/DL (ref 6–23)
CALCIUM SERPL-MCNC: 8.7 MG/DL (ref 8.6–10.2)
CANNABINOID SCREEN URINE: NOT DETECTED
CANNABINOID SCREEN URINE: NOT DETECTED
CHLORIDE BLD-SCNC: 110 MMOL/L (ref 98–107)
CO2: 21 MMOL/L (ref 22–29)
COCAINE METABOLITE SCREEN URINE: NOT DETECTED
COCAINE METABOLITE SCREEN URINE: NOT DETECTED
COHB: 0.4 % (ref 0–1.5)
CREAT SERPL-MCNC: 1.4 MG/DL (ref 0.5–1)
CRITICAL: ABNORMAL
DATE ANALYZED: ABNORMAL
DATE OF COLLECTION: ABNORMAL
EKG ATRIAL RATE: 78 BPM
EKG ATRIAL RATE: 84 BPM
EKG P AXIS: 62 DEGREES
EKG P AXIS: 84 DEGREES
EKG P-R INTERVAL: 128 MS
EKG P-R INTERVAL: 134 MS
EKG Q-T INTERVAL: 372 MS
EKG Q-T INTERVAL: 418 MS
EKG QRS DURATION: 72 MS
EKG QRS DURATION: 88 MS
EKG QTC CALCULATION (BAZETT): 439 MS
EKG QTC CALCULATION (BAZETT): 476 MS
EKG R AXIS: 49 DEGREES
EKG R AXIS: 85 DEGREES
EKG T AXIS: 28 DEGREES
EKG T AXIS: 83 DEGREES
EKG VENTRICULAR RATE: 78 BPM
EKG VENTRICULAR RATE: 84 BPM
FENTANYL SCREEN, URINE: POSITIVE
FENTANYL SCREEN, URINE: POSITIVE
GFR SERPL CREATININE-BSD FRML MDRD: 40 ML/MIN/1.73
GLUCOSE BLD-MCNC: 88 MG/DL (ref 74–99)
HCO3: 20.9 MMOL/L (ref 22–26)
HHB: 3.4 % (ref 0–5)
LAB: ABNORMAL
MAGNESIUM: 1.6 MG/DL (ref 1.6–2.6)
METER GLUCOSE: 129 MG/DL (ref 74–99)
METER GLUCOSE: 150 MG/DL (ref 74–99)
METHADONE SCREEN, URINE: NOT DETECTED
METHADONE SCREEN, URINE: NOT DETECTED
METHB: 0.2 % (ref 0–1.5)
MODE: ABNORMAL
O2 SATURATION: 96.6 % (ref 92–98.5)
O2HB: 96 % (ref 94–97)
OPERATOR ID: 7490
OPIATE SCREEN URINE: NOT DETECTED
OPIATE SCREEN URINE: NOT DETECTED
OXYCODONE URINE: NOT DETECTED
OXYCODONE URINE: POSITIVE
PATIENT TEMP: 37 C
PCO2: 33.7 MMHG (ref 35–45)
PH BLOOD GAS: 7.41 (ref 7.35–7.45)
PHENCYCLIDINE SCREEN URINE: NOT DETECTED
PHENCYCLIDINE SCREEN URINE: NOT DETECTED
PHOSPHORUS: 2.4 MG/DL (ref 2.5–4.5)
PO2: 91.8 MMHG (ref 75–100)
POTASSIUM SERPL-SCNC: 3.7 MMOL/L (ref 3.5–5)
SODIUM BLD-SCNC: 139 MMOL/L (ref 132–146)
SOURCE, BLOOD GAS: ABNORMAL
THB: 9.8 G/DL (ref 11.5–16.5)
TIME ANALYZED: 950

## 2022-11-28 PROCEDURE — 82962 GLUCOSE BLOOD TEST: CPT

## 2022-11-28 PROCEDURE — 6360000002 HC RX W HCPCS

## 2022-11-28 PROCEDURE — 2500000003 HC RX 250 WO HCPCS: Performed by: STUDENT IN AN ORGANIZED HEALTH CARE EDUCATION/TRAINING PROGRAM

## 2022-11-28 PROCEDURE — 6360000002 HC RX W HCPCS: Performed by: INTERNAL MEDICINE

## 2022-11-28 PROCEDURE — 2580000003 HC RX 258: Performed by: INTERNAL MEDICINE

## 2022-11-28 PROCEDURE — 99232 SBSQ HOSP IP/OBS MODERATE 35: CPT | Performed by: INTERNAL MEDICINE

## 2022-11-28 PROCEDURE — 2060000000 HC ICU INTERMEDIATE R&B

## 2022-11-28 PROCEDURE — 84100 ASSAY OF PHOSPHORUS: CPT

## 2022-11-28 PROCEDURE — 80307 DRUG TEST PRSMV CHEM ANLYZR: CPT

## 2022-11-28 PROCEDURE — 82805 BLOOD GASES W/O2 SATURATION: CPT

## 2022-11-28 PROCEDURE — 6370000000 HC RX 637 (ALT 250 FOR IP)

## 2022-11-28 PROCEDURE — C9113 INJ PANTOPRAZOLE SODIUM, VIA: HCPCS | Performed by: INTERNAL MEDICINE

## 2022-11-28 PROCEDURE — A4216 STERILE WATER/SALINE, 10 ML: HCPCS | Performed by: INTERNAL MEDICINE

## 2022-11-28 PROCEDURE — 6360000002 HC RX W HCPCS: Performed by: STUDENT IN AN ORGANIZED HEALTH CARE EDUCATION/TRAINING PROGRAM

## 2022-11-28 PROCEDURE — 6370000000 HC RX 637 (ALT 250 FOR IP): Performed by: INTERNAL MEDICINE

## 2022-11-28 PROCEDURE — 2580000003 HC RX 258: Performed by: STUDENT IN AN ORGANIZED HEALTH CARE EDUCATION/TRAINING PROGRAM

## 2022-11-28 PROCEDURE — 36415 COLL VENOUS BLD VENIPUNCTURE: CPT

## 2022-11-28 PROCEDURE — 80048 BASIC METABOLIC PNL TOTAL CA: CPT

## 2022-11-28 PROCEDURE — 83735 ASSAY OF MAGNESIUM: CPT

## 2022-11-28 RX ORDER — 0.9 % SODIUM CHLORIDE 0.9 %
500 INTRAVENOUS SOLUTION INTRAVENOUS ONCE
Status: COMPLETED | OUTPATIENT
Start: 2022-11-28 | End: 2022-11-28

## 2022-11-28 RX ORDER — MAGNESIUM SULFATE IN WATER 40 MG/ML
2000 INJECTION, SOLUTION INTRAVENOUS ONCE
Status: COMPLETED | OUTPATIENT
Start: 2022-11-28 | End: 2022-11-28

## 2022-11-28 RX ORDER — NALOXONE HYDROCHLORIDE 0.4 MG/ML
0.4 INJECTION, SOLUTION INTRAMUSCULAR; INTRAVENOUS; SUBCUTANEOUS PRN
Status: DISCONTINUED | OUTPATIENT
Start: 2022-11-28 | End: 2022-11-30 | Stop reason: HOSPADM

## 2022-11-28 RX ORDER — AMLODIPINE BESYLATE 5 MG/1
5 TABLET ORAL NIGHTLY
Status: DISCONTINUED | OUTPATIENT
Start: 2022-11-28 | End: 2022-11-29

## 2022-11-28 RX ORDER — NALOXONE HYDROCHLORIDE 0.4 MG/ML
INJECTION, SOLUTION INTRAMUSCULAR; INTRAVENOUS; SUBCUTANEOUS
Status: COMPLETED
Start: 2022-11-28 | End: 2022-11-28

## 2022-11-28 RX ORDER — NALOXONE HYDROCHLORIDE 0.4 MG/ML
0.4 INJECTION, SOLUTION INTRAMUSCULAR; INTRAVENOUS; SUBCUTANEOUS PRN
Status: DISCONTINUED | OUTPATIENT
Start: 2022-11-28 | End: 2022-11-28

## 2022-11-28 RX ORDER — CLOPIDOGREL BISULFATE 75 MG/1
75 TABLET ORAL DAILY
Status: DISCONTINUED | OUTPATIENT
Start: 2022-11-28 | End: 2022-11-30 | Stop reason: HOSPADM

## 2022-11-28 RX ADMIN — Medication 10 ML: at 08:13

## 2022-11-28 RX ADMIN — HEPARIN SODIUM 5000 UNITS: 10000 INJECTION INTRAVENOUS; SUBCUTANEOUS at 20:13

## 2022-11-28 RX ADMIN — HEPARIN SODIUM 5000 UNITS: 10000 INJECTION INTRAVENOUS; SUBCUTANEOUS at 13:42

## 2022-11-28 RX ADMIN — AMLODIPINE BESYLATE 5 MG: 5 TABLET ORAL at 20:10

## 2022-11-28 RX ADMIN — Medication 10 ML: at 20:13

## 2022-11-28 RX ADMIN — SODIUM CHLORIDE 500 ML: 9 INJECTION, SOLUTION INTRAVENOUS at 16:04

## 2022-11-28 RX ADMIN — NALOXONE HYDROCHLORIDE: 0.4 INJECTION, SOLUTION INTRAMUSCULAR; INTRAVENOUS; SUBCUTANEOUS at 10:24

## 2022-11-28 RX ADMIN — HEPARIN SODIUM 5000 UNITS: 10000 INJECTION INTRAVENOUS; SUBCUTANEOUS at 05:59

## 2022-11-28 RX ADMIN — DOXEPIN HYDROCHLORIDE 50 MG: 50 CAPSULE ORAL at 20:13

## 2022-11-28 RX ADMIN — MAGNESIUM SULFATE HEPTAHYDRATE 2000 MG: 40 INJECTION, SOLUTION INTRAVENOUS at 08:08

## 2022-11-28 RX ADMIN — PROMETHAZINE HYDROCHLORIDE 12.5 MG: 12.5 TABLET ORAL at 23:15

## 2022-11-28 RX ADMIN — POTASSIUM PHOSPHATE, MONOBASIC AND POTASSIUM PHOSPHATE, DIBASIC 15 MMOL: 224; 236 INJECTION, SOLUTION, CONCENTRATE INTRAVENOUS at 10:17

## 2022-11-28 RX ADMIN — CLOPIDOGREL BISULFATE 75 MG: 75 TABLET ORAL at 11:16

## 2022-11-28 RX ADMIN — SODIUM CHLORIDE, PRESERVATIVE FREE 40 MG: 5 INJECTION INTRAVENOUS at 08:12

## 2022-11-28 ASSESSMENT — PAIN SCALES - GENERAL: PAINLEVEL_OUTOF10: 0

## 2022-11-28 NOTE — PROCEDURES
RRT called for change in mental status at 0955, and pin point pupils. ABGs colleted. . Vitals 160/56, 100% on room air, HR 78, R 16 Temp 98.1  Narcan ordered and given at 0957. Pt more arousable and awake post med. House team doctor Keyla Nunes was the only responding physician, she ordered the narcan and called end to RRT at 1000. EKG not needed, crash cart not needed.

## 2022-11-28 NOTE — PROGRESS NOTES
Message sent to IM in regards to change in patients mentation and difficulty to arouse. ABG draw done.

## 2022-11-28 NOTE — CARE COORDINATION
Therapy ordered today. Will follow up afterwards to determine if patient able to safely return home. Patient tells me she thinks she could stay at her daughter in law, Irina's home temporarily if needed. She was at [facility] for rehab after CVA but not other NATALYA history. For questions I can be reached at [phone].  Surekha Mojica [place]

## 2022-11-28 NOTE — PLAN OF CARE
Dorave received regarding acute mental status change. Patient lethargic, only waking to sternal rub. While on the way to the patient, RRT was called. Arrived to room, code cart and nurses present. Patient was slumped over in bed, unresponsive to voice. Approached patient, woke to sternal rub and shouted \"I'm trying to sleep! \" Patient immediately closed eyes and refused to answer questions. Vital signs stable: , /56, HR 78, RR 16 and saturating 100% on room air. ABG already drawn prior to examination. Physical examination significant for slurred speech and pinpoint pupils, equal on both sides. Following commands bilaterally, but refusing to answer questions. No drift noted in extremities. No focal neurologic deficits. Narcan ordered and administered. Patient then woke, and said \"I'm tired leave me alone. \"     She then started discussing the nurse's tattoo on her arm. With redirection, the patient was alert and oriented to self and situation after narcan administration. When asked if she had taken anything the patient stated Wen Win what has been given to me. I don't have nothing here. \"     UDS ordered. Patient returned to baseline. ABG still pending at this time.

## 2022-11-28 NOTE — PROGRESS NOTES
[facility]  Internal Medicine Residency Program  Progress Note - House Team     Patient:  Tommy Leyden 66 y.o. female MRN: [id number]     Date of Service: 11/28/2022     CC: weakness and malaise    Days since admission: 2    Subjective     Overnight events: Patient had one episode of chest pain, with negative EKG and troponin. Pain reproducible on examination and has improved with analgesic balm. Patient is resting comfortably in bed in no acute distress. She is asking for BP medications. Informed patient that we are waiting for repeat orthostatic BP evaluation. She is currently asymptomatic. She has no complaints aside from mild abdominal pain, which patient states is her baseline due to her abdominal vascular stenting in the past. She also states she has some tingling in her feet, which is her baseline. She denies headache, dizziness, lightheadedness and shortness of breath. She also denies chest pain. She is concerned that her gabapentin is not working as well as it should. Objective     Physical Exam:  Vitals: BP (!) 154/66   Pulse 77   Temp 98.8 °F (37.1 °C) (Oral)   Resp 16   SpO2 97%     I & O - 24hr:   Intake/Output Summary (Last 24 hours) at 11/28/2022 2309  Last data filed at 11/27/2022 2004  Gross per 24 hour   Intake 10 ml   Output --   Net 10 ml      General Appearance: alert, appears stated age, and cooperative  HEENT:  Head: Normocephalic, no lesions, without obvious abnormality. Dentition: poor - many missing teeth  Neck: supple, symmetrical, trachea midline  Lung: clear to auscultation bilaterally  Heart: regular rate and rhythm, S1, S2 normal, no murmur, click, rub or gallop  Abdomen: soft, non-tender; bowel sounds normal; no masses,  no organomegaly  Extremities:  extremities normal, atraumatic, no cyanosis or edema  Musculokeletal: No joint swelling, no muscle tenderness. ROM normal in all joints of extremities.    Neurologic: Mental status: Alert, oriented, thought content appropriate  Subject  Pertinent Information & Imaging Studies, Consults     CBC:   Lab Results   Component Value Date/Time    WBC 7.4 11/26/2022 01:17 PM    RBC 3.91 11/26/2022 01:17 PM    HGB 12.2 11/26/2022 01:17 PM    HCT 37.9 11/26/2022 01:17 PM    MCV 96.9 11/26/2022 01:17 PM    MCH 31.2 11/26/2022 01:17 PM    MCHC 32.2 11/26/2022 01:17 PM    RDW 12.6 11/26/2022 01:17 PM     11/26/2022 01:17 PM    MPV 9.1 11/26/2022 01:17 PM     CMP:    Lab Results   Component Value Date/Time     11/28/2022 05:06 AM    K 3.7 11/28/2022 05:06 AM    K 3.6 11/26/2022 01:17 PM     11/28/2022 05:06 AM    CO2 21 11/28/2022 05:06 AM    BUN 20 11/28/2022 05:06 AM    CREATININE 1.4 11/28/2022 05:06 AM    GFRAA >60 10/05/2021 05:43 AM    AGRATIO 1.2 09/29/2021 12:08 PM    LABGLOM 40 11/28/2022 05:06 AM    GLUCOSE 88 11/28/2022 05:06 AM    PROT 7.7 11/26/2022 01:17 PM    LABALBU 4.1 11/26/2022 01:17 PM    CALCIUM 8.7 11/28/2022 05:06 AM    BILITOT 0.3 11/26/2022 01:17 PM    ALKPHOS 32 11/26/2022 01:17 PM    AST 13 11/26/2022 01:17 PM    ALT 6 11/26/2022 01:17 PM       IMAGING:   Imaging Studies:    CT HEAD WO CONTRAST    Result Date: 11/26/2022  1. There is no acute intracranial abnormality. Specifically, there is no intracranial hemorrhage. 2. Atrophy and periventricular leukomalacia, 3. Old infarcts within the left basal ganglia and left thalamus. XR CHEST PORTABLE    Result Date: 11/26/2022  No acute process. Notable Cultures:      Blood cultures No results found for: BC  Respiratory cultures No results found for: RESPCULTURE No results found for: LABGRAM  Urine No results found for: LABURIN  Legionella No results found for: LABLEGI  C Diff PCR No results found for: CDIFPCR  Wound culture/abscess: No results for input(s): WNDABS in the last 72 hours. Tip culture:No results for input(s): CXCATHTIP in the last 72 hours.     OXYGENATION: Room air    DIET: Easy to chew        Resident's Assessment and Plan     Vivek Londono is a 66 y.o. female with  has a past medical history of Asthma, Cerebral artery occlusion with cerebral infarction ([address] COPD (chronic obstructive pulmonary disease) ([facility] [address] Hypertension, and Seizures ([facility] [address] came here with CC   Chief Complaint   Patient presents with    Seizures     Patient had seizure activity witnessed by family. Hx of seizure / TIA.  Takes Plavix           Days since admission: 2    Consults:   None    Assessment/Plan:  Neuro   Presyncope 2/2 orthostatic hypotension in the setting of decreased oral intake  Presented for concern for weakness and malaise, as well as worsening lightheadedness and dizziness when standing  No LOC or fall   Decreased oral intake due to loss of teeth, no dentures  Orthostatic + on admission  Orthostatic neg after fluid administration  /61 today  Antihypertensives currently held in setting of orthostatics  Restart amlodipine at dose of 5 mg if orthostatics negative this morning   Denture fitting outpatient and encourage increase in oral intake  Hx of CVA of IMCA with L sided weakness - stable  Hx of ALFREDO saccular aneurysm (10/2021)  Hx of neuropathy    Continue gabapentin  Cardio  Hypertensive urgency - resolved   Chest pain, likely noncardiac  Chest pain overnight  EKG and troponin negative   Pain reproducible on examination, responded to analgesic balm  Continue to monitor   Elevated troponin 2/2 likely demand ischemia   Troponin 34 on admission > 23 yesterday  Down-trended consistently   Chest pain overnight, EKG negative, troponin negative   Hx of chronic SMA occlusion s/p stent placement  Continue bentyl PRN   Continue Lipitor   GI   Hx of chronic abdominal pain in the setting of SMA stenosis vs GERD  Continue bentyl PRN   Continue pantoprazole  Renal  JOHN stage I on CDK stage 3b - baseline creatinine 1.4-1.6 - resolved  Cr 1.4 today, BUN WNL  FeUrea 45.2%, intrinsic  Hypokalemia -  resolved   Psych   Hx of depression  Continue doxepin   Hx of opioid use  Fentanyl positive of UDS   Oxycodone positive - likely iatrogenic   Hx of tobacco abuse   Continue nicoderm patch    PT/OT: Ordered   DVT ppx: heparin   GI ppx: protonix       Next of Kin/ POA:  Kathy Martin    Code Status:   Full     Disposition:Continue current management, possible discharge in next 24-48 hours      Leonel Mckenna DO, PGY-1  Attending physician: Dr. Debbie Jarquin   Attending Physician Statement:  Renee Barrera M.D., F.A.C.P. I have discussed the case, including pertinent history and exam findings with the resident/NP. I have seen and examined the patient and the key elements of the encounter have been performed by me. I agree with the resident ROS, PMHx, PSHx, meds reviewed and assessment, plan and orders as documented by the resident/NP       Hospital charts reviewed, including other providers notes, relevant labs and imaging. Hx of CVA- baseline weakness  Stent SMA hx- needs antiplatelet  -- resume plavix (but NOT taking home, noted asa allergy)    Hx of seizure  - but doubt active-- neurontin for neuropathy feet. - 300tid recently refilled    But likely came in with syncopal episode  Dec PO intake (edentulous, no teeth)  - she went to bathroom- ?passed out, vague  +orthostatics in ER  Bp labile, sp fluids and antihypertensives given- sp labetolol and amlodipine and BP dropped out.  --likely hasn't been taking meds at home  - med refils on 5th and 11/11- urgent  -- DC indapamide- hctz like   -- systolic 735N- restart lower dose amlodipine     JOHN 2 to dehydration  - cr. 1.4 from 1.7 from 2.4-- hx of ckd 1.4-1.6- -- 0.8 one year ago  2nd fluid bolus     Dyphagia diet- will need outpatient denture fitting?   Hx of polysubstance use in past--\"pt is a drug addict and still uses heroine\"  Also risk of use PTA contributing:     Recheck orthostatics if negative- DC soon- but placement  But placement issue per   \"Eliezer said pt

## 2022-11-28 NOTE — PROGRESS NOTES
[facility]  Internal Medicine Residency Program  Progress Note - House Team 1    Patient:  Heather Carreon 66 y.o. female MRN: [id number]     Date of Service: 11/28/2022     CC: pre-syncope  Overnight events: CP likely chest wall given analgesic balm, BL LE neuropathic pain given extra 300mg of gabapentin     Subjective     Patient was resting in her bed comfortably this morning. She was not in any acute distress but stated that she did not sleep well last night because she was worried and having nerve pain on her LE. She stated her chest pain did improve with the topical analgesia. She denies lightheadedness, headaches, N/V, diarrhea, stomach pain. She states that she does not want to take the gabapentin because it worsens her nerve pain but she does take it at home. Objective     Physical Exam:  Vitals: BP (!) 154/66   Pulse 77   Temp 98.8 °F (37.1 °C) (Oral)   Resp 16   SpO2 97%     I & O - 24hr: No intake/output data recorded. General Appearance: alert, appears stated age, and cooperative  HEENT:  Head: Normal, normocephalic, atraumatic. Neck: no adenopathy, no carotid bruit, no JVD, supple, symmetrical, trachea midline, and thyroid not enlarged, symmetric, no tenderness/mass/nodules  Lung: clear to auscultation bilaterally  Heart: regular rate and rhythm, S1, S2 normal, no murmur, click, rub or gallop  Abdomen: soft, non-tender; bowel sounds normal; no masses,  no organomegaly  Extremities:  extremities normal, atraumatic, no cyanosis or edema  Musculokeletal: No joint swelling, no muscle tenderness. ROM normal in all joints of extremities.    Neurologic: Mental status: Alert, oriented, thought content appropriate  Subject  Pertinent Labs & Imaging Studies   martha  CBC:   Lab Results   Component Value Date/Time    WBC 7.4 11/26/2022 01:17 PM    RBC 3.91 11/26/2022 01:17 PM    HGB 12.2 11/26/2022 01:17 PM    HCT 37.9 11/26/2022 01:17 PM    MCV 96.9 11/26/2022 01:17 PM    MCH 31.2 11/26/2022 01:17 PM    MCHC 32.2 11/26/2022 01:17 PM    RDW 12.6 11/26/2022 01:17 PM     11/26/2022 01:17 PM    MPV 9.1 11/26/2022 01:17 PM     BMP:    Lab Results   Component Value Date/Time     11/28/2022 05:06 AM    K 3.7 11/28/2022 05:06 AM    K 3.6 11/26/2022 01:17 PM     11/28/2022 05:06 AM    CO2 21 11/28/2022 05:06 AM    BUN 20 11/28/2022 05:06 AM    LABALBU 4.1 11/26/2022 01:17 PM    CREATININE 1.4 11/28/2022 05:06 AM    CALCIUM 8.7 11/28/2022 05:06 AM    GFRAA >60 10/05/2021 05:43 AM    LABGLOM 40 11/28/2022 05:06 AM    GLUCOSE 88 11/28/2022 05:06 AM     Calcium:    Lab Results   Component Value Date/Time    CALCIUM 8.7 11/28/2022 05:06 AM     Magnesium:    Lab Results   Component Value Date/Time    MG 1.6 11/28/2022 05:06 AM     Phosphorus:    Lab Results   Component Value Date/Time    PHOS 2.4 11/28/2022 05:06 AM     Troponin:    Lab Results   Component Value Date/Time    TROPONINI <0.01 09/29/2021 12:08 PM       Student's Assessment and Plan     1. Pre-syncope 2/2 to dehydration likely, viral illness   -Orthostatics positive in ED, received 2L NS, BP went to 200/90, hydralazine and labetolol given, BP down to 90/40, received NS 500cc bolus, BP stable   -Respiratory panel negative    -Holding antihypertensive medications   -Continuing D5 and NS 125cc/hr    -Monitor glucose q6hr    2. HTN urgency   -Resolved after labetolol and amlodipine    -Continue holding antihypertensive medications   -Continue D5 and  cc/hr   -Monitor glc q6hr    3.Elevated troponin, likely demand ischemia   -Troponin trend 25>24>23   -CP overnight, EKG showed no abnormalities except PAC's   -Given analgesic balm for chest wall pain - improvement    4. JOHN stage I, likely pre-renal, on CKD stage 3a-3b, baseline Cr 1.4-1.6   -Likely due to dehydration, Cr trending down 2.4>1.7>1.4   -Continue D5 and NS 125cc/hr    5. Hx CVA L MCA   -L UE and LE residual deficits, uses cane at home  6. Hx ALFREDO sacular aneurysm on MRI 10/21/21   -Not noted on head CT  7. Hx neuropathy   -On gabapentin   -Worsened neuropathic LE pain overnight ordered extra 300mg dose, patient refused  8. Hx chromic SMA occlusion s/p stent placed 12/22/21   -On bentyl prn   -Continue lipitor  9. Hx chronic abdominal pain likely 2/2 to GERD vs SMA stenosis   -Continue Pantoprazole  10. Hx tobacco use  11. Hx opioid use  12. Hx depression  13. Hx Hypokalemia   -K 3.7 this AM    PT/OT evaluation: ordered  DVT prophylaxis/ GI prophylaxis: heparin / protonix  Disposition: continue current care    Jordi Burkett, MS-3  Attending physician: Dr. Gennaro Mohr

## 2022-11-28 NOTE — PROGRESS NOTES
Comprehensive Nutrition Assessment    Type and Reason for Visit:  Initial, Positive Nutrition Screen (Weightloss/poor appetite)    Nutrition Recommendations/Plan:   Continue current diet. Recommend and start Ensure + BID to optimize intake and monitor. Malnutrition Assessment:  Malnutrition Status: At risk for malnutrition (Comment) (11/28/22 9935)    Context:  Acute Illness     Findings of the 6 clinical characteristics of malnutrition:  Energy Intake:  Mild decrease in energy intake (Comment)  Weight Loss:  Unable to assess (2/2 lack of wt hx on file to assess)     Body Fat Loss:  No significant body fat loss     Muscle Mass Loss:  No significant muscle mass loss    Fluid Accumulation:  No significant fluid accumulation     Strength:  Not Performed    Nutrition Assessment:    Pt. admit d/t Presyncope 2/2 orthostatic hypotension in the setting of decreased oral intake. Hx of COPD, CVA. No po intake data to assess at this time. Pt. reports improvements in appetite. Will start ONS and monitor. Nutrition Related Findings:    A&Ox4, -I/O, elevated crea, hyperglycemia, GI WDL, no edema,pt. appears nourished w/ no significant muscle/fat wasting at this time. Pt. reports no significant changes to PO intake pta but reports 10-20# subjective WL x 2-3 months Wound Type: None       Current Nutrition Intake & Therapies:    Average Meal Intake: 26-50% (pt. reports intake at breakfast ~25-50%)  Average Supplements Intake: None Ordered  ADULT DIET; Easy to Chew; Low Fat/Low Chol/High Fiber/2 gm Na    Anthropometric Measures:  Height: 5' 1\" (154.9 cm) (per EMR)  Ideal Body Weight (IBW): 105 lbs (48 kg)    Admission Body Weight: 121 lb 1 oz (54.9 kg) (per RD BS 11/28 first measured)  Current Body Weight: 121 lb 1 oz (54.9 kg) (Per RD BS 11/28), 115.3 % IBW.  Weight Source: Bed Scale  Current BMI (kg/m2): 22.9  Usual Body Weight: 144 lb 13.5 oz (65.7 kg) (12/22/21 per EMR)  % Weight Change (Calculated): -16.4  Weight Adjustment For: No Adjustment                 BMI Categories: Normal Weight (BMI 22.0 to 24.9) age over 72    Estimated Daily Nutrient Needs:  Energy Requirements Based On: Kcal/kg  Weight Used for Energy Requirements: Current  Energy (kcal/day): 1400-1700kcal (25-30kcal/kg)  Weight Used for Protein Requirements: Current  Protein (g/day): 66-77g (1.2-1.4g/kgCBW)  Method Used for Fluid Requirements: 1 ml/kcal  Fluid (ml/day):     Nutrition Diagnosis:   Inadequate oral intake related to inadequate protein-energy intake as evidenced by intake 26-50%    Nutrition Interventions:   Food and/or Nutrient Delivery: Continue Current Diet, Start Oral Nutrition Supplement (Ensure+ BID vanilla per pt. pref)  Nutrition Education/Counseling: Education not indicated  Coordination of Nutrition Care: Continue to monitor while inpatient       Goals:     Goals: PO intake 50% or greater, by next RD assessment       Nutrition Monitoring and Evaluation:   Behavioral-Environmental Outcomes: None Identified  Food/Nutrient Intake Outcomes: Food and Nutrient Intake, Supplement Intake  Physical Signs/Symptoms Outcomes: Biochemical Data, GI Status, Fluid Status or Edema, Nutrition Focused Physical Findings, Skin, Weight    Discharge Planning:     Too soon to determine     Levorn LOUIE Larsen  Contact: ext [phone]

## 2022-11-28 NOTE — PLAN OF CARE
Problem: Safety - Adult  Goal: Free from fall injury  Outcome: Progressing  Flowsheets (Taken 11/28/2022 9011)  Free From Fall Injury: Instruct family/caregiver on patient safety

## 2022-11-28 NOTE — PROGRESS NOTES
Pt uds came back positive for substance not given by staff. Pt room swept a second time, she was informed she cannot have any visitors during her stay, sign posted on door for visitors for that room to report to nurses station prior to entering. Telesitter placed in room for observation.

## 2022-11-28 NOTE — CODE DOCUMENTATION
RRT called at 5521 for altered mental status and pin point pupils. . Vitals 160/56, HR 78, R 16, T 98.1, 100% on room air. Only responding physician was house team Dr. Cristy Jose. She ordered 1 dose narcan IV, given at 0957. Pt awake and more responsive post med. She called end to RRT at 1000. ABGs were obtained. No EKG or crash cart use need. Will order a urine drug screen to follow.

## 2022-11-29 LAB
ANION GAP SERPL CALCULATED.3IONS-SCNC: 9 MMOL/L (ref 7–16)
BUN BLDV-MCNC: 28 MG/DL (ref 6–23)
CALCIUM SERPL-MCNC: 8.9 MG/DL (ref 8.6–10.2)
CHLORIDE BLD-SCNC: 104 MMOL/L (ref 98–107)
CO2: 24 MMOL/L (ref 22–29)
CREAT SERPL-MCNC: 1.4 MG/DL (ref 0.5–1)
GFR SERPL CREATININE-BSD FRML MDRD: 40 ML/MIN/1.73
GLUCOSE BLD-MCNC: 89 MG/DL (ref 74–99)
MAGNESIUM: 1.8 MG/DL (ref 1.6–2.6)
PHOSPHORUS: 2.1 MG/DL (ref 2.5–4.5)
POTASSIUM SERPL-SCNC: 4.1 MMOL/L (ref 3.5–5)
SODIUM BLD-SCNC: 137 MMOL/L (ref 132–146)

## 2022-11-29 PROCEDURE — 2500000003 HC RX 250 WO HCPCS: Performed by: STUDENT IN AN ORGANIZED HEALTH CARE EDUCATION/TRAINING PROGRAM

## 2022-11-29 PROCEDURE — 36415 COLL VENOUS BLD VENIPUNCTURE: CPT

## 2022-11-29 PROCEDURE — 2580000003 HC RX 258: Performed by: STUDENT IN AN ORGANIZED HEALTH CARE EDUCATION/TRAINING PROGRAM

## 2022-11-29 PROCEDURE — 6360000002 HC RX W HCPCS: Performed by: INTERNAL MEDICINE

## 2022-11-29 PROCEDURE — 99233 SBSQ HOSP IP/OBS HIGH 50: CPT | Performed by: INTERNAL MEDICINE

## 2022-11-29 PROCEDURE — 97161 PT EVAL LOW COMPLEX 20 MIN: CPT

## 2022-11-29 PROCEDURE — 6370000000 HC RX 637 (ALT 250 FOR IP): Performed by: INTERNAL MEDICINE

## 2022-11-29 PROCEDURE — 2060000000 HC ICU INTERMEDIATE R&B

## 2022-11-29 PROCEDURE — 2580000003 HC RX 258: Performed by: INTERNAL MEDICINE

## 2022-11-29 PROCEDURE — 83735 ASSAY OF MAGNESIUM: CPT

## 2022-11-29 PROCEDURE — 97165 OT EVAL LOW COMPLEX 30 MIN: CPT

## 2022-11-29 PROCEDURE — 84100 ASSAY OF PHOSPHORUS: CPT

## 2022-11-29 PROCEDURE — 6370000000 HC RX 637 (ALT 250 FOR IP)

## 2022-11-29 PROCEDURE — 80048 BASIC METABOLIC PNL TOTAL CA: CPT

## 2022-11-29 RX ORDER — AMLODIPINE BESYLATE 10 MG/1
10 TABLET ORAL NIGHTLY
Status: DISCONTINUED | OUTPATIENT
Start: 2022-11-29 | End: 2022-11-30 | Stop reason: HOSPADM

## 2022-11-29 RX ADMIN — Medication 10 ML: at 09:27

## 2022-11-29 RX ADMIN — ACETAMINOPHEN 650 MG: 325 TABLET, FILM COATED ORAL at 11:56

## 2022-11-29 RX ADMIN — SODIUM PHOSPHATE, MONOBASIC, MONOHYDRATE AND SODIUM PHOSPHATE, DIBASIC, ANHYDROUS 20 MMOL: 276; 142 INJECTION, SOLUTION INTRAVENOUS at 09:40

## 2022-11-29 RX ADMIN — Medication 10 ML: at 20:19

## 2022-11-29 RX ADMIN — HEPARIN SODIUM 5000 UNITS: 10000 INJECTION INTRAVENOUS; SUBCUTANEOUS at 14:24

## 2022-11-29 RX ADMIN — DICYCLOMINE HYDROCHLORIDE 20 MG: 10 CAPSULE ORAL at 20:15

## 2022-11-29 RX ADMIN — DOXEPIN HYDROCHLORIDE 50 MG: 50 CAPSULE ORAL at 20:17

## 2022-11-29 RX ADMIN — ACETAMINOPHEN 650 MG: 325 TABLET, FILM COATED ORAL at 20:15

## 2022-11-29 RX ADMIN — PROMETHAZINE HYDROCHLORIDE 12.5 MG: 12.5 TABLET ORAL at 20:15

## 2022-11-29 RX ADMIN — HEPARIN SODIUM 5000 UNITS: 10000 INJECTION INTRAVENOUS; SUBCUTANEOUS at 05:38

## 2022-11-29 RX ADMIN — ACETAMINOPHEN 650 MG: 325 TABLET, FILM COATED ORAL at 02:08

## 2022-11-29 RX ADMIN — AMLODIPINE BESYLATE 10 MG: 10 TABLET ORAL at 20:16

## 2022-11-29 ASSESSMENT — PAIN SCALES - GENERAL: PAINLEVEL_OUTOF10: 3

## 2022-11-29 NOTE — PROGRESS NOTES
[facility]  Internal Medicine Residency Program  Progress Note - House Team     Patient:  Adeola Reina 66 y.o. female MRN: [id number]     Date of Service: 11/29/2022     CC: weakness and malaise    Days since admission: 3    Subjective     Overnight events: BP stable 164/60. Patient states she is feeling well today. No headache, dizziness or lightheadedness overnight or this morning. Denies chest pain or shortness of breath. Mild nausea overnight that responded to phenergan. She states she would like to go home. She is insistent that she has not used any drugs in 3 weeks, and wants to go to rehabilitation on discharge. She informed the  yesterday that she planned to go home with family. The patient is particularly upset with her telesitter and feels that there is an invasion of privacy after her RRT yesterday. She insists she did not take any drugs in her room. Explained to the patient that the telesitter is necessary, especially if she is insisting that her unresponsiveness was not due to drugs. Objective     Physical Exam:  Vitals: BP (!) 190/85   Pulse 91   Temp 98.1 °F (36.7 °C) (Oral)   Resp 16   Ht 5' 1\" (1.549 m) Comment: per EMR  Wt 111 lb 8 oz (50.6 kg)   SpO2 98%   BMI 21.07 kg/m²     I & O - 24hr:   Intake/Output Summary (Last 24 hours) at 11/29/2022 1445  Last data filed at 11/28/2022 2013  Gross per 24 hour   Intake 190 ml   Output --   Net 190 ml      General Appearance: alert, appears stated age, and cooperative  HEENT:  Head: Normocephalic, no lesions, without obvious abnormality.   Dentition: poor - many missing teeth  Neck: supple, symmetrical, trachea midline  Lung: clear to auscultation bilaterally  Heart: regular rate and rhythm, S1, S2 normal, no murmur, click, rub or gallop  Abdomen: soft, non-tender; bowel sounds normal; no masses,  no organomegaly  Extremities:  extremities normal, atraumatic, no cyanosis or edema  Musculokeletal: No joint swelling, no muscle tenderness. ROM normal in all joints of extremities. Neurologic: Mental status: Alert, oriented, thought content appropriate  Subject  Pertinent Information & Imaging Studies, Consults     CBC:   Lab Results   Component Value Date/Time    WBC 7.4 11/26/2022 01:17 PM    RBC 3.91 11/26/2022 01:17 PM    HGB 12.2 11/26/2022 01:17 PM    HCT 37.9 11/26/2022 01:17 PM    MCV 96.9 11/26/2022 01:17 PM    MCH 31.2 11/26/2022 01:17 PM    MCHC 32.2 11/26/2022 01:17 PM    RDW 12.6 11/26/2022 01:17 PM     11/26/2022 01:17 PM    MPV 9.1 11/26/2022 01:17 PM     CMP:    Lab Results   Component Value Date/Time     11/29/2022 05:15 AM    K 4.1 11/29/2022 05:15 AM    K 3.6 11/26/2022 01:17 PM     11/29/2022 05:15 AM    CO2 24 11/29/2022 05:15 AM    BUN 28 11/29/2022 05:15 AM    CREATININE 1.4 11/29/2022 05:15 AM    GFRAA >60 10/05/2021 05:43 AM    AGRATIO 1.2 09/29/2021 12:08 PM    LABGLOM 40 11/29/2022 05:15 AM    GLUCOSE 89 11/29/2022 05:15 AM    PROT 7.7 11/26/2022 01:17 PM    LABALBU 4.1 11/26/2022 01:17 PM    CALCIUM 8.9 11/29/2022 05:15 AM    BILITOT 0.3 11/26/2022 01:17 PM    ALKPHOS 32 11/26/2022 01:17 PM    AST 13 11/26/2022 01:17 PM    ALT 6 11/26/2022 01:17 PM       IMAGING:   Imaging Studies:    CT HEAD WO CONTRAST    Result Date: 11/26/2022  1. There is no acute intracranial abnormality. Specifically, there is no intracranial hemorrhage. 2. Atrophy and periventricular leukomalacia, 3. Old infarcts within the left basal ganglia and left thalamus. XR CHEST PORTABLE    Result Date: 11/26/2022  No acute process. Notable Cultures:      Blood cultures No results found for: BC  Respiratory cultures No results found for: RESPCULTURE No results found for: LABGRAM  Urine No results found for: LABURIN  Legionella No results found for: LABLEGI  C Diff PCR No results found for: CDIFPCR  Wound culture/abscess: No results for input(s): WNDABS in the last 72 hours. Tip culture: No results for input(s): CXCATHTIP in the last 72 hours. OXYGENATION: Room air    DIET: Easy to chew        Resident's Assessment and Plan     Eric Vaca is a 66 y.o. female with  has a past medical history of Asthma, Cerebral artery occlusion with cerebral infarction ([facility] [address] COPD (chronic obstructive pulmonary disease) ([facility] [address] Hypertension, and Seizures ([facility] [address] came here with CC   Chief Complaint   Patient presents with    Seizures     Patient had seizure activity witnessed by family. Hx of seizure / TIA.  Takes Plavix           Days since admission: 2    Consults:   None    Assessment/Plan:  Neuro   Presyncope 2/2 orthostatic hypotension in the setting of decreased oral intake   Presented for concern for weakness and malaise, as well as worsening lightheadedness and dizziness when standing  No LOC or fall   Decreased oral intake due to loss of teeth, no dentures  Orthostatic + on admission  Orthostatic neg after fluid administration  /85 today  Increase amlodipine to 10 mg   Denture fitting outpatient and encourage increase in oral intake  Altered mental status likely 2/2 drug intoxication - improved  RRT yesterday for unresponsiveness  Pinpoint pupils and response to Narcan during event  UDS positive for fentanyl  Continue telesitter  Hx of CVA of IMCA with L sided weakness - stable  Hx of ALFREDO saccular aneurysm (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)  Hx of neuropathy    Continue gabapentin  Cardio  Hypertensive urgency   BP elevated today  Increase amlodipine to home dose of 10 mg   Consider restarting lisinopril now that Cr has returned to baseline  Chest pain, likely noncardiac  Chest pain overnight  EKG and troponin negative   Pain reproducible on examination, responded to analgesic balm  Continue to monitor   Elevated troponin 2/2 likely demand ischemia   Troponin 34 on admission > 23   Down-trended consistently   EKG negative  Hx of chronic SMA occlusion s/p stent placement  Continue bentyl PRN   Continue Lipitor   GI   Hx of chronic abdominal pain in the setting of SMA stenosis vs GERD  Continue bentyl PRN   Continue pantoprazole  Renal  JOHN stage I on CDK stage 3b - baseline creatinine 1.4-1.6 - resolved  Cr 1.4 today, BUN WNL  FeUrea 45.2%, intrinsic  Hypokalemia -  resolved   Psych   Hx of depression  Continue doxepin   Hx of opioid use  Fentanyl positive of UDS   Oxycodone positive but this was given inpatient  Fentanyl positive again on repeat UDS during RRT  Interested in rehabilitation per patient  Coordination with social work for discharge, pending PT evaluation  Hx of tobacco abuse   Continue nicoderm patch    PT/OT: Ordered   DVT ppx: heparin   GI ppx: protonix       Next of Kin/ POA:  Kathy Martin    Code Status:   Full     Disposition:Continue current management      Juan Manuel Marx DO, PGY-1  Attending physician: Dr. Baltazar Villarreal  Attending Physician Statement:  Ace Estevez M.D., F.A.C.P. I have discussed the case, including pertinent history and exam findings with the resident/NP. I have seen and examined the patient and the key elements of the encounter have been performed by me. I agree with the resident ROS, PMHx, PSHx, meds reviewed and assessment, plan and orders as documented by the resident/NP       Hospital charts reviewed, including other providers notes, relevant labs and imaging. Hx of CVA- baseline weakness  Stent SMA hx- needs antiplatelet  -- resume plavix (but NOT taking home, noted asa allergy)     Hx of seizure  - but doubt active-- neurontin for neuropathy feet. - 300tid recently refilled     But likely came in with syncopal episode  Dec PO intake (edentulous, no teeth)  - she went to bathroom- ?passed out, vague  +orthostatics in ER  Bp labile, sp fluids and antihypertensives given- sp labetolol and amlodipine and BP dropped out.  --likely hasn't been taking meds at home  - med refils on 5th and 11/11- urgent  -- DC indapamide- hctz like   -- systolic 580P- restart lower dose amlodipine

## 2022-11-29 NOTE — PROGRESS NOTES
[address]      REDL:14/16/0977                                                  Patient Name: Deena Brito  MRN: [id number]  : 1950  Room: [address] [place] [address]    Evaluating OT: DENISE Cheng, OTR/L  # [id number]    Referring Provider:  Shruthi Ramon., DO  Specific Provider Orders:  Ovidio Barfieldt and Treat\"  22    Diagnosis: Syncope and collapse [R55]  JOHN (acute kidney injury) ([facility] [facility] [N17.9]    Pt was admitted 22 after experiencing syncope vs seizure. RRT 22 d/t altered mental status, decreased level of alertness    Pertinent Medical History:  Pt has a past medical history of Asthma, Cerebral artery occlusion with cerebral infarction ([facility] [address] COPD (chronic obstructive pulmonary disease) ([facility] [address] Hypertension, and Seizures ([facility] [address] ,  has no past surgical history on file.     Surgeries this admission: None     Precautions:  Fall Risk  Cognition - TSM/Bed alarm    Assessment of current deficits   [x] Functional mobility  [x]ADLs  [x] Strength               [x]Cognition   [x] Functional transfers   [x] IADLs         [x] Safety Awareness   [x]Endurance   [x] Fine Coordination              [x] Balance     [] Vision/perception   []Sensation    []Gross Motor Coordination  [] ROM  [] Delirium                  [] Motor Control       OT PLAN OF CARE   OT POC based on physician orders, patient diagnosis and results of clinical assessment    Frequency/Duration 1-3 days/wk for 2 weeks PRN   Specific OT Treatment to include:     * Instruction/training on adapted ADL techniques and AE recommendations to increase functional independence within precautions       * Training on energy conservation strategies, correct breathing pattern and techniques to improve independence/tolerance for self-care routine  * Functional transfer/mobility training/DME recommendations for increased independence, safety, and fall prevention  * Patient/Family education to increase follow through with safety techniques and functional independence  * Recommendation of environmental modifications for increased safety with functional transfers/mobility and ADLs  * Cognitive retraining/development of therapeutic activities to improve problem solving, judgement, memory, and attention for increased safety/participation in ADL/IADL tasks  * Therapeutic exercise to improve motor endurance, ROM, and functional strength for ADLs/functional transfers  * Therapeutic activities to facilitate/challenge dynamic balance, stand tolerance for increased safety and independence with ADLs  * Therapeutic activities to facilitate gross/fine motor skills for increased independence with ADLs  * Neuro-muscular re-education: facilitation of righting/equilibrium reactions  * Positioning to improve skin integrity, interaction with environment and functional independence  * Delirium prevention/treatment  * Manual techniques for edema management  Other:    Recommended Adaptive Equipment: TBD as pt progresses       Home Living:  Pt lives with her Son in a single-level apartment, level entry, no Basement. Bathroom setup:  Tub-Shower, [address] [place] [address] [employer]   Equipment owned:  [facility], [facility], [place], [facility], Shower chair    Available Family Assist:  pt reported family members can provide assist PRN    Prior Level of Function:  Pt reported receiving assist w/ Dressing/Bathing PRN, IND with Toileting, Transfers and Mobility using SPC vs [facility], [place], [facility] PRN for ambulation. Family completes IADLs  Driving:  No  Occupation:  None reported    Pain Level:  Denied pain  Additional Complaints:  none reported    Cognition: A & O x 3 - generally oriented   Able to Follow Multi-Step Commands w/ occasional Min VCs   Memory:  fair (+)   Sequencing:  fair (+)   Problem solving:  fair (+)   Judgement/safety:  fair (+)  Additional Comments:  Pt was pleasant and cooperative.       Vitals/Lab Values:  WFL Room air;       Functional Assessment:  AM-PAC Daily Activity Raw Score: 18/24     Initial Eval Status  Date: 11-29-22   Treatment Status  Date: STGs = LTGs  Time frame: 10-14 days   Feeding SUP/Set up    Pt reported being able to feed self w/ utensils - assist to open containers/cut food     NA   Grooming SUP/Set up    Light ax seated EOB  Declined to stand at sink for tasks    Mod I  Standing at the [facility] A/Set up    [facility] seated EOB    Mod I     LB Dressing Min A/Set up    Donning Socks/shoes seated EOB  Able to adjust clothing over hips - Min A for safety w/ standing balance   Pt ed for safe/adaptive techs    Mod I     Bathing NT    Pt ed re: Benefits of use of Shower chair/Tub Bench, resources for obtaining equip    SUP for safety      Toileting NT    Pt declined    Mod I     Bed Mobility  Supine to sit: Remote SUP   Sit to supine:  Remote SUP     VCs for safety    Supine to sit: IND  Sit to supine: IND     Functional Transfers Close SUP    EOB, chair  Pt ed for safety/hand placement    Mod I     Functional Mobility Close SUP w/ [employer]    Household distances in room/hallway  Pt ed for safety/improved safety awareness, walker safety    Mod I     Balance Sitting:     Static:  Remote SUP EOB/Chair    Dynamic:  Remote SUP w/ functional ax  EOB/Chair    Standing:     Static:  Close SUP w/ [employer]    Dynamic:  Close SUP w/ functional ax/mobility w/ [employer]    Sitting:     Static:  IND    Dynamic:  IND w/ functional ax    Standing:     Static:  Mod I w/ AD PRN    Dynamic:  Mod I w/ functional ax/mobility w/ AD PRN   Activity Tolerance Fair        Good   Visual/  Perceptual    Hearing: WFL   Glasses: Yes - not present    WFL   Hearing Aids:  No               Hand Dominance: Right   AROM Strength Additional Info:    RUE  WFL Grossly 4/5  Hx of Right Hemiparesis - stroke 9 years ago Good(-) ;   Good(-) FMC/dexterity noted during ADL tasks     LUE WFL Grossly 4+/5 Good(-) ;    Good(-) FMC/dexterity noted during ADL tasks       Sensation:  Denies numbness or tingling Silvestre UEs   Tone: WFL Silvestre UEs   Edema: None Noted Silvestre UEs     Comments: Upon arrival, patient was found in supine. She was agreeable to participate in therapeutic ax. No Family present during session. Received permission from RN prior to engaging pt in OT services. Educated pt on role of OT services. At the end of the session, patient requested to remain sitting EOB. Call light and phone within reach, all lines and tubes intact. Oriented pt to call bell. Made all appropriate Environmental Modifications to facilitate pt's level of IND and safety. All needs met. Family at b/s, TSM in place. RN notified of pt's performance/position. Overall patient demonstrated decreased independence and safety during completion of ADL/functional transfer/mobility tasks. Pt would benefit from continued skilled OT to increase safety and independence with completion of ADL/IADL tasks for functional independence and quality of life.     Treatment: OT treatment provided this date includes:   Instruction/training on safety and adapted techniques for completion of ADLs, use of DME/AD/Adaptive equip:    Instruction/training on safe functional mobility/transfer techniques, use of DME/AD:    Instruction/training on energy conservation techs (EC)/Pursed-Lip Breathing (PLB)/work simplification for completion of ADLs:     Neuromuscular Reeducation to facilitate balance/righting reactions for increased function with ADLs:   Activity tolerance - Sitting/Standing to improve endurance w/ functional ax   Cognitive retraining -  Oriented pt to current Date, Place and Situation; Cues for safety/safety awareness, sequencing, problem solving    Skilled monitoring of pt's response to tx ax      Consulted RN, SW/CM, Family, PT - Session completed in collaboration w/ PT for pt's safety    Made all appropriate Environmental Modifications to facilitate pt's level of IND and safety. Recommendations for Continued Participation in OT services during Hospitalization and at D/C     Pt and/or Family verbalized/demonstrated a Good(-) understanding of education provided. Will Review PRN. Rehab Potential: Good(-) for established goals     Patient / Family Goal: Not stated      Patient and/or family were instructed on functional diagnosis, prognosis/goals and OT plan of care. Demonstrated Good(-) understanding. Eval Complexity: Low    Time In: 1356  Time Out: 1413  Total Treatment Time: 2 minutes    Min Units   OT Eval Low 97165  X  1   OT Eval Medium 93515      OT Eval High 21981      OT Re-Eval [id number]       Therapeutic Ex 46473       Therapeutic Activities 90958       ADL/Self Care 10161  2  0   Orthotic Management 78770       Manual [id number]     Neuro Re-Ed 62110       Non-Billable Time              Evaluation Time additionally includes thorough review of current medical information, gathering information on past medical history/social history and prior level of function, completion of standardized testing/informal observation of tasks, assessment of data and education on plan of care and goals.             DENISE Cantu, OTR/L  # [id number]

## 2022-11-29 NOTE — PROGRESS NOTES
Patient refused all of her morning medications except nicotine patch. Pts morning BP was 190/85, she also refused PRN hydralazine.

## 2022-11-29 NOTE — PROGRESS NOTES
[facility]  Internal Medicine Residency Program  Progress Note - House Team 1    Patient:  Mac Pettit 66 y.o. female MRN: [id number]     Date of Service: 11/29/2022     CC: Pre-syncope  Overnight events: no acute events     Subjective     The patient was resting comfortably in her bed. She states that she was not able to sleep last night but is tired. She denied CP, SOB, vomiting, headache, urinary or bowel abnormalities. She states she is nervous of falling again when she gets up but has been getting up to use the bathroom on her own. Orthostatics yesterday were negative. She states she had some nausea overnight and was given promethazine which improved her nausea. Discussion about a facility for physical rehabilitation and drug rehabilitation was had and the patient states that she is interested in that option. Objective     Physical Exam:  Vitals: BP (!) 164/60   Pulse 90   Temp 99.1 °F (37.3 °C) (Oral)   Resp 16   Ht 5' 1\" (1.549 m) Comment: per EMR  Wt 111 lb 8 oz (50.6 kg)   SpO2 96%   BMI 21.07 kg/m²     I & O - 24hr: No intake/output data recorded. General Appearance: alert, appears stated age, and cooperative  HEENT:  Head: Normocephalic, no lesions, without obvious abnormality. Neck: no adenopathy, no carotid bruit, no JVD, supple, symmetrical, trachea midline, and thyroid not enlarged, symmetric, no tenderness/mass/nodules  Lung: clear to auscultation bilaterally  Heart: regular rate and rhythm, S1, S2 normal, no murmur, click, rub or gallop  Abdomen: normal findings: bowel sounds normal and soft, non-tender and abnormal findings:  tenderness mild in the LLQ  Extremities:  extremities normal, atraumatic, no cyanosis or edema  Musculokeletal: No joint swelling, no muscle tenderness. ROM normal in all joints of extremities.    Neurologic: Mental status: Alert, oriented, thought content appropriate  Subject  Pertinent Labs & Imaging Studies   martha  CBC:   Lab Results Component Value Date/Time    WBC 7.4 11/26/2022 01:17 PM    RBC 3.91 11/26/2022 01:17 PM    HGB 12.2 11/26/2022 01:17 PM    HCT 37.9 11/26/2022 01:17 PM    MCV 96.9 11/26/2022 01:17 PM    MCH 31.2 11/26/2022 01:17 PM    MCHC 32.2 11/26/2022 01:17 PM    RDW 12.6 11/26/2022 01:17 PM     11/26/2022 01:17 PM    MPV 9.1 11/26/2022 01:17 PM     BMP:    Lab Results   Component Value Date/Time     11/29/2022 05:15 AM    K 4.1 11/29/2022 05:15 AM    K 3.6 11/26/2022 01:17 PM     11/29/2022 05:15 AM    CO2 24 11/29/2022 05:15 AM    BUN 28 11/29/2022 05:15 AM    LABALBU 4.1 11/26/2022 01:17 PM    CREATININE 1.4 11/29/2022 05:15 AM    CALCIUM 8.9 11/29/2022 05:15 AM    GFRAA >60 10/05/2021 05:43 AM    LABGLOM 40 11/29/2022 05:15 AM    GLUCOSE 89 11/29/2022 05:15 AM       Student's Assessment and Plan     1. Pre-syncope 2/2 to dehydration likely, viral illness              -Orthostatics positive in ED, received 2L NS, BP went to 200/90, hydralazine and labetolol given, BP down to 90/40, received NS 500cc bolus, BP stable              -Respiratory panel negative               -Resumed amlodipine at 5mg               -Monitor glucose q6hr   -Denture fitting to increase oral intake - outpatient   -Orthostatics negative 11/28     2. HTN urgency              -Resolved after labetolol and amlodipine    -Amlodipine restarted at 5mg, consider resuming lisinopril if BP remains stable              -Monitor glc q6hr     3.Elevated troponin, likely demand ischemia              -Troponin trend 25>24>23              -EKG showed no abnormalities except PAC's              -Given analgesic balm for chest wall pain - improvement     4. JOHN stage I, likely pre-renal, on CKD stage 3a-3b, baseline Cr 1.4-1.6              -Likely due to dehydration, Cr trending down 2.4>1.7>1.4     5. Hx CVA L MCA              -L UE and LE residual deficits, uses cane at home  6. Hx ALFREDO sacular aneurysm on MRI 10/21/21              -Not noted on head CT  7.Hx neuropathy              -On gabapentin              -Worsened neuropathic LE pain overnight ordered extra 300mg dose, patient refused  8. Hx chromic SMA occlusion s/p stent placed 12/22/21              -On bentyl prn              -Continue lipitor  9. Hx chronic abdominal pain likely 2/2 to GERD vs SMA stenosis              -Continue Pantoprazole and bentyl prn  10. Hx tobacco use   -nicotine patch  11. Hx opioid use   -UDS positive for fentanyl 2x, once likely due to patient administration inpatient   -Patient interested in receiving drug rehabilitation on discharge  12. Hx depression   -Continue doxepin  13. Hx Hypokalemia              -K 4.1 this AM    PT/OT evaluation: ordered  DVT prophylaxis/ GI prophylaxis: plavix, protonix  Disposition: physical rehabilitation and drug rehabilitation on discharge    Dylan Molina, MS-3  Attending physician: Dr. Stephanie Bunn

## 2022-11-29 NOTE — DISCHARGE INSTRUCTIONS
Internal medicine    Follow ups  Please follow up with the internal medicine clinic at [facility] appointment has been scheduled for 12/9/2022 at 1 PM.  Please keep all other follow up appointments:  Future Appointments   Date Time Provider Briseida Blackwood   12/9/2022  1:00 PM Gatito Sanchez MD [facility]       Changes in healthcare   Please take all medications as indicated  Diet: regular diet   Activity: activity as tolerated  New Medications started during this hospital stay  None  Changes to your medications  Decrease amlodipine to 5 mg once daily  Medications you should stop taking   Gabapentin  Additional labs, testing or imaging needed after discharge   None  Please contact us if you have any concerns, wish to change or make an appointment:  Internal medicine clinic   Phone: [phone]  Fax: [phone]  [address]  Should you have further questions in regards to this visit, you can review your clinical note and after visit summary document on your [employer] account. Other than any new prescriptions given to you today, the list of home medications on this After Visit Summary are based on information provided to us from you and your healthcare providers. This information, including the list, dose, and frequency of medications is only as accurate as the information you provided. If you have any questions or concerns about your home medications, please contact your Primary Care Physician for further clarification.

## 2022-11-29 NOTE — PROGRESS NOTES
ROM  [x]Decreased functional mobility  [x]Decreased balance   [x]Decreased endurance   []Decreased posture  []Decreased sensation  []Decreased coordination   []Decreased vision  [x]Decreased safety awareness   []Increased pain       Comments:    Pt was in bed upon room entry; agreeable to PT evaluation. Family members present. Pt initially ambulated without AD. Gait was slow but fairly steady. Pt reports she ambulates with WW at baseline and was agreeable to use device in hallway. Gait speed and steadiness were similar with device. Pt ambulated back to room and was seated. All questions and concerns were addressed. Pt was left in bed with all needs met at conclusion of session. Treatment:  Patient practiced and was instructed in the following treatment:    Therapeutic activities:  Transfers: Pt was cued for hand placement during sit <> stand transfers. Ambulation: Pt ambulated with and without [employer]. Pt was cued for [employer] technique and safety. Pt's/family goals:  1. To return home. Prognosis is Good for reaching above PT goals. Patient and or family understand(s) diagnosis, prognosis, and plan of care. Yes. PHYSICAL THERAPY PLAN OF CARE:    PT POC is established based on physician order and patient diagnosis     Referring provider/PT Order:    Start   Ordering Provider    11/28/22 0915  PT eval and treat  Start:  11/28/22 0915,   End:  11/28/22 0915,   ONE TIME,   Standing Count:  1 Occurrences,   R         Broderick Hinton., DO      Diagnosis:  Syncope and collapse [R55]  JOHN (acute kidney injury) ([facility] [address] [N17.9]  Specific instructions for next treatment:  Progress activity.     Current Treatment Recommendations:     [x] Strengthening to improve independence with functional mobility   [] ROM to improve independence with functional mobility   [x] Balance Training to improve static/dynamic balance and to reduce fall risk  [x] Endurance Training to improve activity tolerance during functional mobility   [x]

## 2022-11-29 NOTE — CARE COORDINATION
Spoke with patient with daughter in law, Brittany Espinoza, patient can return home with her when released. Long conversation with patient and granddaughter at bedside regarding addiction treatment. Resources provided to patient. Discussed [facility] walk in program and patient is agreeable to outpatient treatment when released from hospital.     For questions I can be reached at [phone].  [place], [place]

## 2022-11-30 VITALS
BODY MASS INDEX: 21.05 KG/M2 | DIASTOLIC BLOOD PRESSURE: 61 MMHG | HEIGHT: 61 IN | TEMPERATURE: 98.6 F | WEIGHT: 111.5 LBS | HEART RATE: 93 BPM | RESPIRATION RATE: 18 BRPM | OXYGEN SATURATION: 99 % | SYSTOLIC BLOOD PRESSURE: 158 MMHG

## 2022-11-30 LAB
ANION GAP SERPL CALCULATED.3IONS-SCNC: 10 MMOL/L (ref 7–16)
BUN BLDV-MCNC: 25 MG/DL (ref 6–23)
CALCIUM SERPL-MCNC: 9.2 MG/DL (ref 8.6–10.2)
CHLORIDE BLD-SCNC: 106 MMOL/L (ref 98–107)
CO2: 23 MMOL/L (ref 22–29)
CREAT SERPL-MCNC: 0.9 MG/DL (ref 0.5–1)
GFR SERPL CREATININE-BSD FRML MDRD: >60 ML/MIN/1.73
GLUCOSE BLD-MCNC: 87 MG/DL (ref 74–99)
MAGNESIUM: 1.7 MG/DL (ref 1.6–2.6)
PHOSPHORUS: 3.1 MG/DL (ref 2.5–4.5)
POTASSIUM SERPL-SCNC: 4.2 MMOL/L (ref 3.5–5)
SODIUM BLD-SCNC: 139 MMOL/L (ref 132–146)

## 2022-11-30 PROCEDURE — 6370000000 HC RX 637 (ALT 250 FOR IP): Performed by: INTERNAL MEDICINE

## 2022-11-30 PROCEDURE — 99239 HOSP IP/OBS DSCHRG MGMT >30: CPT | Performed by: INTERNAL MEDICINE

## 2022-11-30 PROCEDURE — 36415 COLL VENOUS BLD VENIPUNCTURE: CPT

## 2022-11-30 PROCEDURE — A4216 STERILE WATER/SALINE, 10 ML: HCPCS | Performed by: INTERNAL MEDICINE

## 2022-11-30 PROCEDURE — 84100 ASSAY OF PHOSPHORUS: CPT

## 2022-11-30 PROCEDURE — 2580000003 HC RX 258: Performed by: INTERNAL MEDICINE

## 2022-11-30 PROCEDURE — C9113 INJ PANTOPRAZOLE SODIUM, VIA: HCPCS | Performed by: INTERNAL MEDICINE

## 2022-11-30 PROCEDURE — 80048 BASIC METABOLIC PNL TOTAL CA: CPT

## 2022-11-30 PROCEDURE — 83735 ASSAY OF MAGNESIUM: CPT

## 2022-11-30 PROCEDURE — 6370000000 HC RX 637 (ALT 250 FOR IP)

## 2022-11-30 PROCEDURE — 6360000002 HC RX W HCPCS: Performed by: INTERNAL MEDICINE

## 2022-11-30 RX ORDER — CLOPIDOGREL BISULFATE 75 MG/1
75 TABLET ORAL DAILY
Qty: 30 TABLET | Refills: 0 | Status: SHIPPED | OUTPATIENT
Start: 2022-11-30

## 2022-11-30 RX ORDER — AMLODIPINE BESYLATE 5 MG/1
5 TABLET ORAL NIGHTLY
Qty: 30 TABLET | Refills: 0 | Status: SHIPPED | OUTPATIENT
Start: 2022-11-30 | End: 2022-12-08

## 2022-11-30 RX ORDER — LISINOPRIL 40 MG/1
40 TABLET ORAL DAILY
Qty: 30 TABLET | Refills: 0 | Status: SHIPPED | OUTPATIENT
Start: 2022-11-30

## 2022-11-30 RX ORDER — LISINOPRIL 20 MG/1
40 TABLET ORAL DAILY
Status: DISCONTINUED | OUTPATIENT
Start: 2022-11-30 | End: 2022-11-30 | Stop reason: HOSPADM

## 2022-11-30 RX ADMIN — HEPARIN SODIUM 5000 UNITS: 10000 INJECTION INTRAVENOUS; SUBCUTANEOUS at 05:37

## 2022-11-30 RX ADMIN — SODIUM CHLORIDE, PRESERVATIVE FREE 40 MG: 5 INJECTION INTRAVENOUS at 08:24

## 2022-11-30 RX ADMIN — CLOPIDOGREL BISULFATE 75 MG: 75 TABLET ORAL at 08:25

## 2022-11-30 RX ADMIN — LISINOPRIL 40 MG: 20 TABLET ORAL at 08:25

## 2022-11-30 RX ADMIN — Medication 10 ML: at 08:25

## 2022-11-30 ASSESSMENT — ENCOUNTER SYMPTOMS
BLOOD IN STOOL: 0
ANAL BLEEDING: 0
APNEA: 0
STRIDOR: 0
CHEST TIGHTNESS: 0
EYE ITCHING: 0
EYE PAIN: 0
SHORTNESS OF BREATH: 0
NAUSEA: 0
CHOKING: 0
WHEEZING: 0
DIARRHEA: 0
ABDOMINAL DISTENTION: 0
COUGH: 0
VOMITING: 0
ABDOMINAL PAIN: 0
EYE REDNESS: 0
EYE DISCHARGE: 0

## 2022-11-30 NOTE — DISCHARGE SUMMARY
[address]  Discharge Summary    PCP: No primary care provider on file. Admit Date:11/26/2022  Discharge Date: 11/30/2022    Chief Complaint   Patient presents with    Seizures     Patient had seizure activity witnessed by family. Hx of seizure / TIA. 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 Orthostatic positive, resolved  Status post hypertensive urgency  Elevated troponin, most likely demand ischemia, trending down  JOHN stage I, most likely pre-renal, on CKD 31-3b, baseline creatinine 1.4-1.6. resolved  Hx of CVA, lMCA with L sided weakness in upper and lower extrmeities, stable  Hx of ALFREDO saccular aneurysm, stable  Hx of neuropathy, on gabapentin  Hx of chronic SMA occlusion, s/p stent placement  Hx of chronic abdominal pain likely 2/2 stenosis of SMA  Hx of GERD  hx of tobacco use, on nicotine patch  Hx of opioid use in setting of recent RRT due to fentanyl  Hx of depression  Hypokalemia, resolved    Hospital Course:    The patient is a 66 y.o. female with a past medical history of hypertension, asthma, COPD, polysubstance abuse, CVA (2021), PFO/ASD (2021), celiac/mesenteric stenosis s/p stent placement and seizure who presented to the ED for concerns of seizure-like activity witnessed by family. Patient noticed she lost his appetite about 4 days prior to admission, which was associated with generalized body weakness and malaise. She also noticed worsening lightheadedness with positional dizziness especially when standing for a while making her wanting to sit down always. She could not remember details of the events leading to her presenting to the ED but she could remember going to the bathroom and possibly passing out no report of falling or hitting her head on any surface. She was assisted by her granddaughter who brought her to the ED for evaluation. She also reported history of nausea and diarrhea with the belching. However, patient denies any fever, cough, recent travel or sick contacts. UDS positive for fentanyl, not given at the hospital, and oxycodone, given at the hospital for severe headache. During hospital stay, BP dropped after administration of amlodipine 10 mg and gabapentin-medications were hold. Pt was started on continuous fluid, which improved BP and prerenal JOHN. However, next day RRT was called due to patient being unresponsive. BP was stable, but due to pinpoint pupils and hx of drug abuse, patient was given narcan once, which immediately returned patient mentation. UDS positive for fentanyl. During the rest of hospital stay, kidney function restored to baseline, creatinine 1.0 on discharge, BP was normal-elevated, when patient was refusing medications. On discharge it was decided to hold gabapentin ( home medications, which wasn't filled recently), and decrease amlodipine from 10 mg to 5 mg. Please reassess BP upon patient arrival for PCP appointment. On day of discharge :   Subjective:  No nausea, vomiting, diarrhea, Bp stable-elevated. Pt was planning to go home with Matheus Butts. Review of Systems   Constitutional:  Negative for activity change, appetite change, chills and diaphoresis. HENT:  Negative for congestion and dental problem. Eyes:  Negative for pain, discharge, redness and itching. Respiratory:  Negative for apnea, cough, choking, chest tightness, shortness of breath, wheezing and stridor. Cardiovascular:  Negative for chest pain, palpitations and leg swelling. Gastrointestinal:  Negative for abdominal distention, abdominal pain, anal bleeding, blood in stool, diarrhea, nausea and vomiting. Endocrine: Negative for cold intolerance, heat intolerance, polydipsia, polyphagia and polyuria. Genitourinary:  Negative for difficulty urinating. Musculoskeletal:  Negative for arthralgias. Neurological:  Negative for dizziness. Hematological:  Negative for adenopathy. Psychiatric/Behavioral:  Negative for agitation, behavioral problems and confusion. Follow up in Clinic:   December 9th, 1 pm    Significant findings (history and exam, laboratory, radiological, pathology, other tests):   General Appearance: alert, appears stated age, and cooperative  HEENT:  Head: Normocephalic, no lesions, without obvious abnormality. Neck: no adenopathy  Lung: clear to auscultation bilaterally  Heart: regular rate and rhythm, S1, S2 normal, no murmur, click, rub or gallop  Abdomen: soft, non-tender; bowel sounds normal; no masses,  no organomegaly  Extremities:  extremities normal, atraumatic, no cyanosis or edema  Musculokeletal: No joint swelling, no muscle tenderness. ROM normal in all joints of extremities.    Neurologic: Mental status: Alert, oriented, thought content appropriate  Pending test results: none    Consults:  none    Procedures:  none    Condition at discharge: Stable    Disposition: home    Time taken for discharge : < 30 mins [] >30 mins [x]    Discharge Medications:  Current Discharge Medication List        START taking these medications    Details   nicotine (NICODERM CQ) 7 MG/24HR Place 1 patch onto the skin every 24 hours for 14 days  Qty: 30 patch, Refills: 0           CONTINUE these medications which have CHANGED    Details   clopidogrel (PLAVIX) 75 MG tablet Take 1 tablet by mouth daily  Qty: 30 tablet, Refills: 0      amLODIPine (NORVASC) 5 MG tablet Take 1 tablet by mouth nightly  Qty: 30 tablet, Refills: 0      lisinopril (PRINIVIL;ZESTRIL) 40 MG tablet Take 1 tablet by mouth daily  Qty: 30 tablet, Refills: 0           CONTINUE these medications which have NOT CHANGED    Details   indapamide (LOZOL) 2.5 MG tablet Take 2.5 mg by mouth every morning      busPIRone (BUSPAR) 5 MG tablet Take 5 mg by mouth daily      doxepin (SINEQUAN) 50 MG capsule Take 50 mg by mouth nightly      dicyclomine (BENTYL) 20 MG tablet Take 1 tablet by mouth every 6 hours as needed (Abdominal Cramping)  Qty: 120 tablet, Refills: 3      albuterol sulfate HFA (PROVENTIL;VENTOLIN;PROAIR) 108 (90 Base) MCG/ACT inhaler Inhale 2 puffs into the lungs every 6 hours as needed for Wheezing      pantoprazole (PROTONIX) 40 MG tablet Take 40 mg by mouth daily      ondansetron (ZOFRAN-ODT) 4 MG disintegrating tablet Take 4 mg by mouth every 8 hours as needed for Nausea or Vomiting           STOP taking these medications       gabapentin (NEURONTIN) 300 MG capsule Comments:   Reason for Stopping:         doxycycline hyclate (VIBRAMYCIN) 100 MG capsule Comments:   Reason for Stopping:         FLUoxetine (PROZAC) 20 MG capsule Comments:   Reason for Stopping:         mirtazapine (REMERON) 15 MG tablet Comments:   Reason for Stopping:         ondansetron (ZOFRAN) 4 MG tablet Comments:   Reason for Stopping:         polyethylene glycol (GLYCOLAX) 17 GM/SCOOP powder Comments:   Reason for Stopping:         rosuvastatin (CRESTOR) 5 MG tablet Comments:   Reason for Stopping:         gabapentin (NEURONTIN) 300 MG capsule Comments:   Reason for Stopping:         atorvastatin (LIPITOR) 40 MG tablet Comments:   Reason for Stopping:         melatonin 3 MG TABS tablet Comments:   Reason for Stopping:         nicotine (NICODERM CQ) 14 MG/24HR Comments:   Reason for Stopping:         ALPRAZolam (XANAX) 0.25 MG tablet Comments:   Reason for Stopping:         venlafaxine (EFFEXOR XR) 75 MG extended release capsule Comments:   Reason for Stopping:         linaclotide (LINZESS) 145 MCG capsule Comments:   Reason for Stopping:               Activity: activity as tolerated  Diet: cardiac diet    Follow-up appointments:   PCP on December 9th    Patient Instructions: explained    Lula Vega MD  PGY1   1:44 PM 11/30/2022

## 2022-11-30 NOTE — PROGRESS NOTES
[facility]  Internal Medicine Residency Program  Progress Note - House Team     Patient:  Anna Nunez 66 y.o. female MRN: [id number]     Date of Service: 11/30/2022     CC: presyncope/fall    Days since admission: 4    Subjective     Overnight events:pt was refusing some of the medications yesterday, but agreed to take amlodipine. /86  No n/v, diarrhea. Pt has a good appetite and dint have any complaints today. Objective     Physical Exam:  Vitals: BP (!) 154/86   Pulse 91   Temp 99.8 °F (37.7 °C) (Temporal)   Resp 18   Ht 5' 1\" (1.549 m) Comment: per EMR  Wt 111 lb 8 oz (50.6 kg)   SpO2 98%   BMI 21.07 kg/m²     I & O - 24hr: No intake or output data in the 24 hours ending 11/30/22 0721     General Appearance: alert, appears stated age, and cooperative  HEENT:  Head: Normocephalic, no lesions, without obvious abnormality. Neck: no adenopathy  Lung: clear to auscultation bilaterally  Heart: regular rate and rhythm, S1, S2 normal, no murmur, click, rub or gallop  Abdomen: soft, non-tender; bowel sounds normal; no masses,  no organomegaly  Extremities:  extremities normal, atraumatic, no cyanosis or edema  Musculokeletal: No joint swelling, no muscle tenderness. ROM normal in all joints of extremities.    Neurologic: Mental status: Alert, oriented, thought content appropriate  Subject  Pertinent Information & Imaging Studies, Consults   martha  CBC with Differential:    Lab Results   Component Value Date/Time    WBC 7.4 11/26/2022 01:17 PM    RBC 3.91 11/26/2022 01:17 PM    HGB 12.2 11/26/2022 01:17 PM    HCT 37.9 11/26/2022 01:17 PM     11/26/2022 01:17 PM    MCV 96.9 11/26/2022 01:17 PM    MCH 31.2 11/26/2022 01:17 PM    MCHC 32.2 11/26/2022 01:17 PM    RDW 12.6 11/26/2022 01:17 PM    LYMPHOPCT 41.6 11/26/2022 01:17 PM    MONOPCT 6.1 11/26/2022 01:17 PM    BASOPCT 0.9 11/26/2022 01:17 PM    MONOSABS 0.45 11/26/2022 01:17 PM    LYMPHSABS 3.08 11/26/2022 01:17 PM    EOSABS 0.14 11/26/2022 01:17 PM    BASOSABS 0.07 11/26/2022 01:17 PM     BMP:    Lab Results   Component Value Date/Time     11/30/2022 04:53 AM    K 4.2 11/30/2022 04:53 AM    K 3.6 11/26/2022 01:17 PM     11/30/2022 04:53 AM    CO2 23 11/30/2022 04:53 AM    BUN 25 11/30/2022 04:53 AM    LABALBU 4.1 11/26/2022 01:17 PM    CREATININE 0.9 11/30/2022 04:53 AM    CALCIUM 9.2 11/30/2022 04:53 AM    GFRAA >60 10/05/2021 05:43 AM    LABGLOM >60 11/30/2022 04:53 AM    GLUCOSE 87 11/30/2022 04:53 AM     Magnesium:    Lab Results   Component Value Date/Time    MG 1.7 11/30/2022 04:53 AM     Phosphorus:    Lab Results   Component Value Date/Time    PHOS 3.1 11/30/2022 04:53 AM     Last 3 Troponin:    Lab Results   Component Value Date/Time    TROPONINI <0.01 09/29/2021 12:08 PM     U/A:    Lab Results   Component Value Date/Time    COLORU Yellow 11/26/2022 05:57 PM    PROTEINU Negative 11/26/2022 05:57 PM    PHUR 6.0 11/26/2022 05:57 PM    WBCUA NONE 11/26/2022 05:57 PM    RBCUA NONE 11/26/2022 05:57 PM    BACTERIA NONE SEEN 11/26/2022 05:57 PM    CLARITYU Clear 11/26/2022 05:57 PM    SPECGRAV 1.010 11/26/2022 05:57 PM    LEUKOCYTESUR Negative 11/26/2022 05:57 PM    UROBILINOGEN 0.2 11/26/2022 05:57 PM    BILIRUBINUR Negative 11/26/2022 05:57 PM    BLOODU Negative 11/26/2022 05:57 PM    GLUCOSEU Negative 11/26/2022 05:57 PM     VITAMIN B12: No components found for: B12  FOLATE:    Lab Results   Component Value Date/Time    FOLATE 16.4 11/27/2022 05:18 AM       IMAGING:   Imaging Studies:    CT HEAD WO CONTRAST    Result Date: 11/26/2022  1. There is no acute intracranial abnormality. Specifically, there is no intracranial hemorrhage. 2. Atrophy and periventricular leukomalacia, 3. Old infarcts within the left basal ganglia and left thalamus. XR CHEST PORTABLE    Result Date: 11/26/2022  No acute process.             PROCEDURES:  none    FLUIDS: D5  cc/hr      Notable Cultures:      Blood cultures No results found for: [facility]  Respiratory cultures No results found for: RESPCULTURE No results found for: LABGRAM  Urine No results found for: LABURIN  Legionella No results found for: LABLEGI  C Diff PCR No results found for: CDIFPCR  Wound culture/abscess: No results for input(s): WNDABS in the last 72 hours. Tip culture:No results for input(s): CXCATHTIP in the last 72 hours. Antibiotic  Days  Day started   none                               OXYGENATION: none    DIET: adult regular diet        Resident's Assessment and Plan     Nasreen Vincent is a 66 y.o. female with  has a past medical history of Asthma, Cerebral artery occlusion with cerebral infarction ([facility] [address] COPD (chronic obstructive pulmonary disease) ([facility] [address] Hypertension, and [facility] came here with CC   Chief Complaint   Patient presents with    Seizures     Patient had seizure activity witnessed by family. Hx of seizure / TIA. Takes Plavix           Days since admission: 1    Consults:   none    Assessment:  Pre-syncope likely secondary to dehydration vs drug abuse. Orthostatic positive, resolved    Plan:  Continue amlodipine 10 mg  Monitor vitals    2. Status post hypertensive urgency  Pt got labetalol and amlodipine due to BP in 200s, now /55,   Plan:  Continue amlodipine 10 mg  Start lisinopril 40 mg daily for BP control-home medication  Monitor vitals    3. Elevated troponin, most likely demand ischemia, trending down    4. JOHN stage I, most likely pre-renal, on CKD 31-3b, baseline creatinine 1.4-1.6. resolved  Cr 0.9  Plan:  Continue easy to chew diet    5. Hx of CVA, lMCA with L sided weakness in upper and lower extrmeities, stable    6. Hx of ALFREDO saccular aneurysm, stable    7. Hx of neuropathy, on gabapentin    8. Hx of chronic SMA occlusion, s/p stent placement  plan:  Bentyl PRN  Continue lipitor  Continue plavix    9. Hx of chronic abdominal pain likely 2/2 stenosis of SMA vs GERD?   Plan:  Continue oral Pantoprazole     10.hx of tobacco use, on nicotine patch    11. Hx of opioid use in setting of recent RRT due to fentanyl  Plan:  Coordination with , pt is agreeable with [facility] walk in program as outpatient    12. Hx of depression    13. Hypokalemia, resolved  Plan:  Monitor BMP daily    PT/OT: 18/24  DVT ppx: heparin 5000 Un TID  GI ppx: protonix      Next of Kin/ POA:none    Code Status: full    Disposition: for d/c home  Ambrocio Monahan MD, PGY-1  Attending physician: Dr. Jorge Ott  NOTE: This report was transcribed using voice recognition software. Every effort was made to ensure accuracy; however, inadvertent computerized transcription errors may be present. Attending Physician Statement:  Randi Younger M.D., F.A.C.P. I have discussed the case, including pertinent history and exam findings with the resident/NP. I have seen and examined the patient and the key elements of the encounter have been performed by me. I agree with the resident ROS, PMHx, PSHx, meds reviewed and assessment, plan and orders as documented by the resident/NP       Hospital charts reviewed, including other providers notes, relevant labs and imaging. Hx of CVA- baseline weakness  Stent SMA hx- needs antiplatelet  -- resume plavix (but NOT taking home, noted asa allergy)     Hx of seizure  - but doubt active-- neurontin for neuropathy feet. - 300tid recently refilled     But likely came in with syncopal episode  Dec PO intake (edentulous, no teeth)  - she went to bathroom- ?passed out, vague  +orthostatics in ER  Bp labile, sp fluids and antihypertensives given- sp labetolol and amlodipine and BP dropped out.  --likely hasn't been taking meds at home  - med refils on 5th and 11/11- urgent  -- DC indapamide- hctz like   -- systolic 078B- restart lower dose amlodipine      JOHN 2 to dehydration  - cr. 1.4 from 1.7 from 2.4-- hx of ckd 1.4-1.6- -- 0.8 one year ago  2nd fluid bolus     Dyphagia diet- will need outpatient denture fitting?   Hx of polysubstance use in past--\"pt is a drug addict and still uses heroine\"  Let gabapentin dosing same, bc chronically on- recommend wean later? ? Also risk of use PTA contributing:     Recheck orthostatics are  negative- DC soon- but placement  But placement issue per   \"Eliezer said pt falls often due to drugs and refuses to go to rehab for it and jose. His plan he said is home but he would like her to stay with family away from their home for a short time to get her away from the people she does drugs with. He has a son in [phone] that pt may stay with or daughter in law , guerita\"        Noted:  Fentanyl positive of UDS --not on med list  Oxycodone positive - given in house, per patient request for HA  High risk pain behavior     Early this am -- more alert  Then called to re mayra, bc per nurse  Wouldn't open eyes, less verbal  Noted this am -- more lethargic but Would wake up to sternal rub, would follow commands  - no focal deficits this am  Slightly slurred speech and pin point pupils  -- she was left unattended this am, and could have take \"home supply\" of opiods  -- sp trial naracan  Did result in significant improvement in level of alertness within one minute. -- likely took opiods this am.  (With personal belongings)   upset about telesitter after this  Also intermittently refused meds? This am     Reach out to family--- NOT OK to stay with opiod users  Plan stay with daugher in law-family members. -- ? Guerita  -- OK for DC -- dc time >30min  PT/OT -home health  -- [facility]  Plan  home health also . >50% of time spent coordinating care with other providers and/or counseling patient/family  Remainder of medical problems as per resident note.

## 2022-12-02 NOTE — PROGRESS NOTES
CLINICAL PHARMACY NOTE: MEDS TO BEDS    Total # of Prescriptions Filled: 4   The following medications were delivered to the patient:  Clopidogrel 75 mg  Lisinopril 40 mg  Amlodipine 5 mg      Additional Documentation:     Picked up in the pharmacy.  Patient did not want the patches

## 2022-12-08 ENCOUNTER — OFFICE VISIT (OUTPATIENT)
Dept: INTERNAL MEDICINE | Age: 72
End: 2022-12-08
Payer: MEDICARE

## 2022-12-08 VITALS
SYSTOLIC BLOOD PRESSURE: 173 MMHG | HEART RATE: 95 BPM | HEIGHT: 61 IN | WEIGHT: 110.4 LBS | OXYGEN SATURATION: 99 % | BODY MASS INDEX: 20.84 KG/M2 | TEMPERATURE: 97.6 F | RESPIRATION RATE: 16 BRPM | DIASTOLIC BLOOD PRESSURE: 78 MMHG

## 2022-12-08 DIAGNOSIS — Z91.81 AT HIGH RISK FOR FALLS: ICD-10-CM

## 2022-12-08 DIAGNOSIS — F43.21 GRIEF: ICD-10-CM

## 2022-12-08 DIAGNOSIS — Z09 HOSPITAL DISCHARGE FOLLOW-UP: ICD-10-CM

## 2022-12-08 DIAGNOSIS — I10 PRIMARY HYPERTENSION: Primary | ICD-10-CM

## 2022-12-08 PROCEDURE — 99212 OFFICE O/P EST SF 10 MIN: CPT | Performed by: INTERNAL MEDICINE

## 2022-12-08 RX ORDER — ZINC OXIDE/CORN STARCH 15 %-81 %
POWDER (GRAM) TOPICAL
COMMUNITY

## 2022-12-08 RX ORDER — AMLODIPINE BESYLATE 10 MG/1
10 TABLET ORAL NIGHTLY
Qty: 30 TABLET | Refills: 1 | Status: SHIPPED | OUTPATIENT
Start: 2022-12-08

## 2022-12-08 SDOH — ECONOMIC STABILITY: HOUSING INSECURITY: IN THE LAST 12 MONTHS, HOW MANY PLACES HAVE YOU LIVED?: 1

## 2022-12-08 SDOH — ECONOMIC STABILITY: FOOD INSECURITY: WITHIN THE PAST 12 MONTHS, YOU WORRIED THAT YOUR FOOD WOULD RUN OUT BEFORE YOU GOT MONEY TO BUY MORE.: SOMETIMES TRUE

## 2022-12-08 SDOH — ECONOMIC STABILITY: INCOME INSECURITY: IN THE LAST 12 MONTHS, WAS THERE A TIME WHEN YOU WERE NOT ABLE TO PAY THE MORTGAGE OR RENT ON TIME?: NO

## 2022-12-08 SDOH — ECONOMIC STABILITY: HOUSING INSECURITY
IN THE LAST 12 MONTHS, WAS THERE A TIME WHEN YOU DID NOT HAVE A STEADY PLACE TO SLEEP OR SLEPT IN A SHELTER (INCLUDING NOW)?: NO

## 2022-12-08 SDOH — ECONOMIC STABILITY: TRANSPORTATION INSECURITY
IN THE PAST 12 MONTHS, HAS THE LACK OF TRANSPORTATION KEPT YOU FROM MEDICAL APPOINTMENTS OR FROM GETTING MEDICATIONS?: NO

## 2022-12-08 SDOH — ECONOMIC STABILITY: FOOD INSECURITY: WITHIN THE PAST 12 MONTHS, THE FOOD YOU BOUGHT JUST DIDN'T LAST AND YOU DIDN'T HAVE MONEY TO GET MORE.: SOMETIMES TRUE

## 2022-12-08 SDOH — ECONOMIC STABILITY: TRANSPORTATION INSECURITY
IN THE PAST 12 MONTHS, HAS LACK OF TRANSPORTATION KEPT YOU FROM MEETINGS, WORK, OR FROM GETTING THINGS NEEDED FOR DAILY LIVING?: NO

## 2022-12-08 ASSESSMENT — PATIENT HEALTH QUESTIONNAIRE - PHQ9
SUM OF ALL RESPONSES TO PHQ QUESTIONS 1-9: 11
4. FEELING TIRED OR HAVING LITTLE ENERGY: 3
6. FEELING BAD ABOUT YOURSELF - OR THAT YOU ARE A FAILURE OR HAVE LET YOURSELF OR YOUR FAMILY DOWN: 0
SUM OF ALL RESPONSES TO PHQ QUESTIONS 1-9: 11
2. FEELING DOWN, DEPRESSED OR HOPELESS: 2
SUM OF ALL RESPONSES TO PHQ QUESTIONS 1-9: 11
SUM OF ALL RESPONSES TO PHQ QUESTIONS 1-9: 11
7. TROUBLE CONCENTRATING ON THINGS, SUCH AS READING THE NEWSPAPER OR WATCHING TELEVISION: 1
SUM OF ALL RESPONSES TO PHQ9 QUESTIONS 1 & 2: 4
8. MOVING OR SPEAKING SO SLOWLY THAT OTHER PEOPLE COULD HAVE NOTICED. OR THE OPPOSITE, BEING SO FIGETY OR RESTLESS THAT YOU HAVE BEEN MOVING AROUND A LOT MORE THAN USUAL: 0
10. IF YOU CHECKED OFF ANY PROBLEMS, HOW DIFFICULT HAVE THESE PROBLEMS MADE IT FOR YOU TO DO YOUR WORK, TAKE CARE OF THINGS AT HOME, OR GET ALONG WITH OTHER PEOPLE: 1
3. TROUBLE FALLING OR STAYING ASLEEP: 2
9. THOUGHTS THAT YOU WOULD BE BETTER OFF DEAD, OR OF HURTING YOURSELF: 0
5. POOR APPETITE OR OVEREATING: 1
1. LITTLE INTEREST OR PLEASURE IN DOING THINGS: 2

## 2022-12-08 ASSESSMENT — LIFESTYLE VARIABLES: HOW OFTEN DO YOU HAVE A DRINK CONTAINING ALCOHOL: NEVER

## 2022-12-08 ASSESSMENT — SOCIAL DETERMINANTS OF HEALTH (SDOH): HOW HARD IS IT FOR YOU TO PAY FOR THE VERY BASICS LIKE FOOD, HOUSING, MEDICAL CARE, AND HEATING?: HARD

## 2022-12-08 NOTE — PATIENT INSTRUCTIONS
We highly encourage that you drink more fluids. Please try to drink 3-4 bottles of water per day. For your blood pressure, please stop the indapamide. Please take amlodipine 10 mg tablet. Please measure your blood pressure with a BP cuff and record it in a paper every day. Please complete the course of antibiotics for your UTI. We will see you back in 1 week to see how you are doing. Please bring the BP cuff and blood pressure log paper at the next visit.     Dr. Zaida Louise

## 2022-12-08 NOTE — PROGRESS NOTES
[facility]idency [facility]    Attending Physician Statement  I have discussed the case, including pertinent history and exam findings with the resident physician. I agree with the assessment, plan and orders as documented by the resident. I have reviewed all pertinent PMHx, PSHx, FamHx, SocialHx, medications, and allergies and updated history as appropriate. Patient here for routine follow up of medical problems. HTN  -stop thiazide diuretic 2/2 dehydration; continue lisinopril; increase amlodipine to 10 mg daily   -repeat BMP 2/2 JOHN and UA 2/2 recent UTI treatment by [facility] in 1 week    Grief  -multiple deaths in family; adjustment disorder  -referral to [facility] of medical problems as per resident note. Zaid Fabian DO  12/8/2022 6:07 PM    Encounter time including independent chart review, discussion with patient, interpreting test results and/or external communications: 30'  On the basis of positive falls risk screening, assessment and plan is as follows: home safety tips provided.

## 2022-12-08 NOTE — PROGRESS NOTES
Post-Discharge Transitional Care  Follow Up      Dagmar Perez   YOB: 1950    Date of Office Visit:  12/8/2022  Date of Hospital Admission: 11/26/22  Date of Hospital Discharge: 11/30/22  Risk of hospital readmission (high >=14%. Medium >=10%) :Readmission Risk Score: 8.6      Care management risk score Rising risk (score 2-5) and Complex Care (Scores >=6): No Risk Score On File     Non face to face  following discharge, date last encounter closed (first attempt may have been earlier): *No documented post hospital discharge outreach found in the last 14 days    Call initiated 2 business days of discharge: *No response recorded in the last 14 days    ASSESSMENT/PLAN:   Primary hypertension  -     Basic Metabolic Panel; Future  -     Misc. Devices KIT; Disp-1 kit, R-0, PrintBP Cuff and Kit. Dx I10. Measure BP as instructed  -     amLODIPine (NORVASC) 10 MG tablet; Take 1 tablet by mouth nightly, Disp-30 tablet, R-1Normal  Grief  -     [facility], Counseling Services, [facility] Clinics Only  At high risk for falls  Hospital discharge follow-up  -     TN DISCHARGE MEDS RECONCILED W/ CURRENT OUTPATIENT MED LIST    Pt has past medical history of hypertension, asthma, COPD, polysubstance abuse, CVA (2021), PFO/ASD (2021), celiac/mesenteric stenosis s/p stent placement and seizure  and here for hospital follow up . HTN- /78 mm Hg. Pt taking all meds but still not drinking enough fluids. Pt lightheaded on getting up. Encouraged to drink more fluids to reduce orthostatic Sx. Stop indapamide, incr amlodipine to 10mg, prescribed BP cuff and log given. Measure for 1 week and return to clinic with BP cuff and log . Grief- admits to recent stressors such as deaths in the family. Counseling services offered and referral made.      UTI- went to urgent care after discharge for itching while urinating and decreased urine output, diagnosed with UTI, 7 day course of cephalexin ongoing. Asked to complete course and stop OTC phenazopyridine. RTC in 1 week to review BP log, check BP, adjust meds further ( remove doxazosin, add clonidine? ) as needed      Medical Decision Making: low complexity  Return in about 1 week (around 12/15/2022) for Follow up visit, BP check. On this date 12/8/2022 I have spent 30 minutes reviewing previous notes, test results and face to face with the patient discussing the diagnosis and importance of compliance with the treatment plan as well as documenting on the day of the visit. Subjective:   HPI:  Follow up of Hospital problems/diagnosis(es):     Inpatient course: Discharge summary reviewed- see chart. Interval history/Current status:   Previous [facility] Visits:  11/26-11/30 [facility] admission ,past medical history of hypertension, asthma, COPD, polysubstance abuse, CVA (2021), PFO/ASD (2021), celiac/mesenteric stenosis s/p stent placement and seizure who presented to the ED for concerns of seizure-like activity witnessed by family. Generalized body weakness and malaize, lightheadedness x 4 days, 1 syncopal episode in bathrom. UDS+ve for fentanyl. Hypotension during stay- amlodipine and gabapentin held. IVF given, prerenal JOHN resolved. BP was stable, but due to pinpoint pupils and hx of drug abuse, patient was given narcan once, which immediately returned patient mentation. On discharge it was decided to hold gabapentin ( home medications, which wasn't filled recently), and decrease amlodipine from 10 mg to 5 mg. Please reassess BP upon patient arrival for PCP appointment.       HPI:   Rosalee Mahmood to [facility] 2 days ago - for UTI symptoms (itching while  urinating, very little output) - on cephalexin for 3 days , stopped uristat ultra (phenazopyridine) which she got over the counter    Still 5 more days of cephalexin to go , symptoms improving       HTN- Lightheaded on standing up still - likely still orthostatic positive ,   /83 mm Hg, open to using cuff and logging in     Bump on right chest - since 1 year, a/w 20 lb weight loss in 1 year , no fevers, night sweats, j ust feeling cold all the time . Chronic cough- 1/2 ppd x Age 24 yrs     For chronic pain - was on roxycodone but then doctor  , so went to fentanyl for pain control ( was snorting it but now stopped)     Patient Active Problem List   Diagnosis    TIA (transient ischemic attack)    Severe malnutrition ([facility] [address]    JOHN (acute kidney injury) ([facility] [address]       Medications listed as ordered at the time of discharge from hospital     Medication List            Accurate as of 2022 11:59 PM. If you have any questions, ask your nurse or doctor. START taking these medications      Misc. Devices Kit  BP Cuff and Kit. Dx I10.  Measure BP as instructed  Started by: Saúl Euceda MD            CHANGE how you take these medications      amLODIPine 10 MG tablet  Commonly known as: NORVASC  Take 1 tablet by mouth nightly  What changed:   medication strength  how much to take  Changed by: Saúl Euceda MD            CONTINUE taking these medications      albuterol sulfate  (90 Base) MCG/ACT inhaler  Commonly known as: PROVENTIL;VENTOLIN;PROAIR     busPIRone 5 MG tablet  Commonly known as: BUSPAR     clopidogrel 75 MG tablet  Commonly known as: PLAVIX  Take 1 tablet by mouth daily     dicyclomine 20 MG tablet  Commonly known as: BENTYL  Take 1 tablet by mouth every 6 hours as needed (Abdominal Cramping)     doxepin 50 MG capsule  Commonly known as: SINEQUAN     lisinopril 40 MG tablet  Commonly known as: PRINIVIL;ZESTRIL  Take 1 tablet by mouth daily     nicotine 7 MG/24HR  Commonly known as: Nicoderm CQ  Place 1 patch onto the skin every 24 hours for 14 days     ondansetron 4 MG disintegrating tablet  Commonly known as: ZOFRAN-ODT     pantoprazole 40 MG tablet  Commonly known as: PROTONIX     Uristat Ultra 99.5 MG Tabs  Generic drug: Phenazopyridine HCl            STOP taking these medications      indapamide 2.5 MG tablet  Commonly known as: LOZOL  Stopped by: Magda Marte MD               Where to Get Your Medications        These medications were sent to [facility] [address] [address]      Phone: [phone]   amLODIPine 10 MG tablet       You can get these medications from any pharmacy    Bring a paper prescription for each of these medications  Misc. Devices Kit           Medications marked \"taking\" at this time  Outpatient Medications Marked as Taking for the 12/8/22 encounter (Office Visit) with Magda Marte MD   Medication Sig Dispense Refill    Phenazopyridine HCl (URISTAT ULTRA) 99.5 MG TABS Take by mouth      Misc. Devices KIT BP Cuff and Kit. Dx I10. Measure BP as instructed 1 kit 0    amLODIPine (NORVASC) 10 MG tablet Take 1 tablet by mouth nightly 30 tablet 1    lisinopril (PRINIVIL;ZESTRIL) 40 MG tablet Take 1 tablet by mouth daily 30 tablet 0    busPIRone (BUSPAR) 5 MG tablet Take 5 mg by mouth daily      doxepin (SINEQUAN) 50 MG capsule Take 50 mg by mouth nightly      dicyclomine (BENTYL) 20 MG tablet Take 1 tablet by mouth every 6 hours as needed (Abdominal Cramping) 120 tablet 3    albuterol sulfate HFA (PROVENTIL;VENTOLIN;PROAIR) 108 (90 Base) MCG/ACT inhaler Inhale 2 puffs into the lungs every 6 hours as needed for Wheezing      pantoprazole (PROTONIX) 40 MG tablet Take 40 mg by mouth daily      ondansetron (ZOFRAN-ODT) 4 MG disintegrating tablet Take 4 mg by mouth every 8 hours as needed for Nausea or Vomiting          Medications patient taking as of now reconciled against medications ordered at time of hospital discharge: Yes    A comprehensive review of systems was negative except for what was noted in the HPI.     Objective:    BP (!) 173/78   Pulse 95   Temp 97.6 °F (36.4 °C) (Temporal)   Resp 16   Ht 5' 1\" (1.549 m)   Wt 110 lb 6.4 oz (50.1 kg)   SpO2 99% Comment: ra  BMI 20.86 kg/m²   General Appearance: alert and oriented to person, place and time, dehydrated  Skin: dry skin, decreased turgor   Head: normocephalic and atraumatic  Eyes: pupils equal, round, and reactive to light, extraocular eye movements intact, conjunctivae normal  ENT: tympanic membrane, external ear and ear canal normal bilaterally, nose without deformity, nasal mucosa and turbinates normal without polyps  Neck: supple and non-tender without mass, no thyromegaly or thyroid nodules, no cervical lymphadenopathy  Pulmonary/Chest: clear to auscultation bilaterally- no wheezes, rales or rhonchi, normal air movement, no respiratory distress  Cardiovascular: normal rate, regular rhythm, normal S1 and S2, no murmurs, rubs, clicks, or gallops, distal pulses intact, no carotid bruits  Abdomen: soft, non-tender, non-distended, normal bowel sounds, no masses or organomegaly  Extremities: no cyanosis, clubbing or edema  Musculoskeletal: normal range of motion, no joint swelling, deformity or tenderness  Neurologic: reflexes normal and symmetric, no cranial nerve deficit, gait, coordination and speech normal      An electronic signature was used to authenticate this note.   --Ilene Aguayo MD

## 2022-12-14 ENCOUNTER — HOSPITAL ENCOUNTER (OUTPATIENT)
Age: 72
Discharge: HOME | End: 2022-12-14
Payer: MEDICARE

## 2022-12-14 DIAGNOSIS — I10 PRIMARY HYPERTENSION: ICD-10-CM

## 2022-12-14 LAB
ANION GAP SERPL CALCULATED.3IONS-SCNC: 17 MMOL/L (ref 7–16)
BUN BLDV-MCNC: 31 MG/DL (ref 6–23)
CALCIUM SERPL-MCNC: 10.1 MG/DL (ref 8.6–10.2)
CHLORIDE BLD-SCNC: 105 MMOL/L (ref 98–107)
CO2: 20 MMOL/L (ref 22–29)
CREAT SERPL-MCNC: 1.1 MG/DL (ref 0.5–1)
GFR SERPL CREATININE-BSD FRML MDRD: 53 ML/MIN/1.73
GLUCOSE BLD-MCNC: 68 MG/DL (ref 74–99)
POTASSIUM SERPL-SCNC: 4.1 MMOL/L (ref 3.5–5)
SODIUM BLD-SCNC: 142 MMOL/L (ref 132–146)

## 2022-12-14 PROCEDURE — 36415 COLL VENOUS BLD VENIPUNCTURE: CPT

## 2022-12-14 PROCEDURE — 80048 BASIC METABOLIC PNL TOTAL CA: CPT

## 2022-12-15 ENCOUNTER — OFFICE VISIT (OUTPATIENT)
Dept: INTERNAL MEDICINE | Age: 72
End: 2022-12-15
Payer: MEDICARE

## 2022-12-15 VITALS
DIASTOLIC BLOOD PRESSURE: 77 MMHG | OXYGEN SATURATION: 99 % | WEIGHT: 117.2 LBS | BODY MASS INDEX: 22.13 KG/M2 | HEART RATE: 88 BPM | RESPIRATION RATE: 18 BRPM | TEMPERATURE: 98.1 F | HEIGHT: 61 IN | SYSTOLIC BLOOD PRESSURE: 157 MMHG

## 2022-12-15 DIAGNOSIS — I10 PRIMARY HYPERTENSION: Primary | ICD-10-CM

## 2022-12-15 PROCEDURE — 3078F DIAST BP <80 MM HG: CPT | Performed by: INTERNAL MEDICINE

## 2022-12-15 PROCEDURE — 99212 OFFICE O/P EST SF 10 MIN: CPT | Performed by: INTERNAL MEDICINE

## 2022-12-15 PROCEDURE — 99213 OFFICE O/P EST LOW 20 MIN: CPT | Performed by: INTERNAL MEDICINE

## 2022-12-15 PROCEDURE — 1123F ACP DISCUSS/DSCN MKR DOCD: CPT | Performed by: INTERNAL MEDICINE

## 2022-12-15 PROCEDURE — 3074F SYST BP LT 130 MM HG: CPT | Performed by: INTERNAL MEDICINE

## 2022-12-15 RX ORDER — LISINOPRIL 40 MG/1
40 TABLET ORAL DAILY
Qty: 30 TABLET | Refills: 0 | Status: SHIPPED | OUTPATIENT
Start: 2022-12-15

## 2022-12-15 RX ORDER — CEFDINIR 300 MG/1
CAPSULE ORAL
COMMUNITY
Start: 2022-12-05

## 2022-12-15 RX ORDER — CLOPIDOGREL BISULFATE 75 MG/1
75 TABLET ORAL DAILY
Qty: 30 TABLET | Refills: 0 | Status: CANCELLED | OUTPATIENT
Start: 2022-12-15

## 2022-12-15 RX ORDER — HYDRALAZINE HYDROCHLORIDE 10 MG/1
10 TABLET, FILM COATED ORAL 3 TIMES DAILY
Qty: 90 TABLET | Refills: 0 | Status: SHIPPED | OUTPATIENT
Start: 2022-12-15

## 2022-12-15 RX ORDER — UREA 10 %
1 LOTION (ML) TOPICAL 3 TIMES DAILY
COMMUNITY

## 2022-12-15 RX ORDER — ONDANSETRON 4 MG/1
4 TABLET, ORALLY DISINTEGRATING ORAL EVERY 8 HOURS PRN
Status: CANCELLED | OUTPATIENT
Start: 2022-12-15

## 2022-12-15 ASSESSMENT — ENCOUNTER SYMPTOMS
COUGH: 0
SHORTNESS OF BREATH: 0
ABDOMINAL PAIN: 1
BACK PAIN: 0

## 2022-12-15 NOTE — PATIENT INSTRUCTIONS
Please start Hydralazine 10 mg three times/day - we discussed Coreg however, this medication is being flagged as an allergy for you.    Continue to check your blood pressure at home   Follow-up in 2 weeks for your blood pressure

## 2022-12-15 NOTE — PROGRESS NOTES
[facility] Internal Medicine      SUBJECTIVE:  Gris Che (:  1950) is a 66 y.o. female here for evaluation of the following chief complaint(s):  Follow-up and Hypertension    Ms. Mike Gatica is a 66year old female with PMHx of TIA, HTN, Aneurysm, Tobacco us, depression, GERD   During her last visit, Thiazide diuretic was stopped due to concerns for dehydration, and patient was asked to continue lisinopril, amlodipine was increased to 10 mg daily. Patient is accompanied by her son's child's mother. Her blood pressure logs still show BP of 140-150s SBP. She is having some accompanied headaches when her BP are high. Review of Systems   Constitutional:  Negative for fatigue. Respiratory:  Negative for cough and shortness of breath. Cardiovascular:  Negative for chest pain, palpitations and leg swelling. Gastrointestinal:  Positive for abdominal pain. Genitourinary:  Negative for dysuria. Musculoskeletal:  Negative for back pain. Neurological:  Negative for headaches. Psychiatric/Behavioral:  Negative for decreased concentration. Current Outpatient Medications on File Prior to Visit   Medication Sig Dispense Refill    calcium carbonate (OS-CARMELITA) 1250 (500 Ca) MG chewable tablet Take 1 tablet by mouth 3 times daily      cefdinir (OMNICEF) 300 MG capsule       Misc. Devices KIT BP Cuff and Kit. Dx I10.  Measure BP as instructed 1 kit 0    amLODIPine (NORVASC) 10 MG tablet Take 1 tablet by mouth nightly 30 tablet 1    clopidogrel (PLAVIX) 75 MG tablet Take 1 tablet by mouth daily 30 tablet 0    lisinopril (PRINIVIL;ZESTRIL) 40 MG tablet Take 1 tablet by mouth daily 30 tablet 0    busPIRone (BUSPAR) 5 MG tablet Take 5 mg by mouth daily As needed      doxepin (SINEQUAN) 50 MG capsule Take 50 mg by mouth nightly      dicyclomine (BENTYL) 20 MG tablet Take 1 tablet by mouth every 6 hours as needed (Abdominal Cramping) (Patient taking differently: Take 20 mg by mouth in the morning and at bedtime) 120 tablet 3    albuterol sulfate HFA (PROVENTIL;VENTOLIN;PROAIR) 108 (90 Base) MCG/ACT inhaler Inhale 2 puffs into the lungs every 6 hours as needed for Wheezing      pantoprazole (PROTONIX) 40 MG tablet Take 40 mg by mouth daily      ondansetron (ZOFRAN-ODT) 4 MG disintegrating tablet Take 4 mg by mouth every 8 hours as needed for Nausea or Vomiting      cyanocobalamin 1000 MCG tablet Take 1,000 mcg by mouth daily (Patient not taking: Reported on 12/15/2022)      Phenazopyridine HCl (URISTAT ULTRA) 99.5 MG TABS Take by mouth      nicotine (NICODERM CQ) 7 MG/24HR Place 1 patch onto the skin every 24 hours for 14 days (Patient not taking: No sig reported) 30 patch 0     No current facility-administered medications on file prior to visit. OBJECTIVE:    VS:   Vitals:    12/15/22 1424   BP: (!) 157/77   Site: Right Upper Arm   Position: Sitting   Cuff Size: Small Adult   Pulse: 88   Resp: 18   Temp: 98.1 °F (36.7 °C)   TempSrc: Temporal   SpO2: 99%   Weight: 117 lb 3.2 oz (53.2 kg)   Height: 5' 1\" (1.549 m)     Physical Exam  Constitutional:       General: She is not in acute distress. Appearance: She is not ill-appearing. HENT:      Head: Normocephalic and atraumatic. Mouth/Throat:      Mouth: Mucous membranes are moist.   Eyes:      Extraocular Movements: Extraocular movements intact. Conjunctiva/sclera: Conjunctivae normal.      Pupils: Pupils are equal, round, and reactive to light. Cardiovascular:      Rate and Rhythm: Normal rate and regular rhythm. Pulses: Normal pulses. Heart sounds: Normal heart sounds. No murmur heard. No friction rub. No gallop. Pulmonary:      Effort: Pulmonary effort is normal.      Breath sounds: Normal breath sounds. No wheezing, rhonchi or rales. Abdominal:      General: There is no distension. Tenderness: There is no abdominal tenderness. Musculoskeletal:         General: No swelling. Right lower leg: No edema. Left lower leg: No edema. Skin:     General: Skin is warm. Coloration: Skin is not jaundiced or pale. Neurological:      General: No focal deficit present. Mental Status: She is alert and oriented to person, place, and time. Motor: No weakness. ASSESSMENT/PLAN:    HTN  - Diuretics stopped on last visit for concerns for dehydration   - Amlodipine 10 mg nightly   - Lisinopril 40 mg daily, creatinine stable 1.1    - Patient was advised to take Hydralazine 10 mg TID however she was reluctant to do a thrice daily dosage. Talked to patient regarding Coreg. Patient cannot recall allergies. - Unfortunately patient had to leave due to transport issues prior to further discussion of medication reactions   - Will prescribe her Hydralazine 10 mg TID - called patient to update her regarding this and follow-up appointment in 2 weeks, no answer, will attempt again)     Addendum:   - Attempted to call again 12/16 - spoke to her to continue amlodipine and lisinopril as prescribed and hydralazine has been added. She is agreeable     RTC:  Return in about 11 days (around 12/26/2022) for BP check . Time spent:  15 Minutes  I have reviewed my findings and recommendations with Jillian Chamorro and Dr. Jayde Wharton.     Rhiannon Mckeon MD   12/15/2022 3:23 PM

## 2022-12-15 NOTE — PROGRESS NOTES
[facility]    Attending Physician Statement  I have discussed the case, including pertinent history and exam findings with the resident physician. I agree with the assessment, plan and orders as documented by the resident. I have reviewed all pertinent PMHx, PSHx, FamHx, SocialHx, medications, and allergies and updated history as appropriate. Patient presents for routine follow up of medical problems. BP follow up one week. HCTZ discontinued due to the concern of dehydraton and amlodipine but BP has been remained elevated, recommended to add Coreg 6.25 and follow up BP. Remainder of medical problems as per resident note.     Yesy Peck MD  12/15/2022 2:46 PM

## 2022-12-16 RX ORDER — ONDANSETRON 4 MG/1
4 TABLET, ORALLY DISINTEGRATING ORAL EVERY 8 HOURS PRN
Qty: 30 TABLET | Refills: 0 | Status: SHIPPED | OUTPATIENT
Start: 2022-12-16

## 2022-12-19 ENCOUNTER — TELEPHONE (OUTPATIENT)
Dept: INTERNAL MEDICINE | Age: 72
End: 2022-12-19

## 2022-12-26 ENCOUNTER — HOSPITAL ENCOUNTER (EMERGENCY)
Age: 72
Discharge: HOME | End: 2022-12-26
Attending: EMERGENCY MEDICINE
Payer: MEDICARE

## 2022-12-26 VITALS
TEMPERATURE: 97.6 F | WEIGHT: 113 LBS | OXYGEN SATURATION: 99 % | BODY MASS INDEX: 21.35 KG/M2 | SYSTOLIC BLOOD PRESSURE: 183 MMHG | HEART RATE: 90 BPM | RESPIRATION RATE: 17 BRPM | DIASTOLIC BLOOD PRESSURE: 85 MMHG

## 2022-12-26 DIAGNOSIS — Z20.822 EXPOSURE TO COVID-19 VIRUS: Primary | ICD-10-CM

## 2022-12-26 DIAGNOSIS — Z71.89 EDUCATED ABOUT COVID-19 VIRUS INFECTION: ICD-10-CM

## 2022-12-26 LAB
INFLUENZA A BY PCR: NOT DETECTED
INFLUENZA B BY PCR: NOT DETECTED
SARS-COV-2, NAAT: NOT DETECTED

## 2022-12-26 PROCEDURE — 99283 EMERGENCY DEPT VISIT LOW MDM: CPT

## 2022-12-26 PROCEDURE — 87635 SARS-COV-2 COVID-19 AMP PRB: CPT

## 2022-12-26 PROCEDURE — 87502 INFLUENZA DNA AMP PROBE: CPT

## 2022-12-26 NOTE — ED PROVIDER NOTES
Department of Emergency Medicine   ED  Provider Note  Admit Date/RoomTime: 10/17/2022  9:05 PM  ED Room: 26/26              10/17/22  9:43 PM EDT    History of Present Illness:   Pt is a 67 y. o.gender: Female presenting to the ED for covid testing. Severity - mild  Provoking factors - patient with recent covid exposure   Quality - HA  Timing - 1 day  Patient with recent exposure,  patient tested for covid        Review of Systems:   Review of Systems   Constitutional:  Negative for activity change, appetite change, chills and fever. HENT:  Positive for congestion and rhinorrhea. Negative for mouth sores, sinus pressure, sinus pain and sore throat. Respiratory:  Positive for cough. Negative for chest tightness and shortness of breath. Gastrointestinal:  Negative for abdominal distention, abdominal pain, constipation, diarrhea, nausea and vomiting. Genitourinary:  Negative for dysuria. Musculoskeletal:  Negative for arthralgias, back pain and myalgias. Skin:  Negative for rash. Neurological:  Negative for weakness and headaches. Hematological:  Negative for adenopathy.            --------------------------------------------- PAST HISTORY ---------------------------------------------  Past Medical History:  has a past medical history of Asthma. Past Surgical History:  has no past surgical history on file. Social History:  reports that he is a non-smoker but has been exposed to tobacco smoke. He has never used smokeless tobacco. He reports that he does not drink alcohol. Family History: family history is not on file. Unless otherwise noted, family history is non contributory    The patients home medications have been reviewed. Allergies: Patient has no known allergies.     -------------------------------------------------- RESULTS -------------------------------------------------  All laboratory and radiology results have been personally reviewed by myself   LABS:  No results found for this visit on 10/17/22. RADIOLOGY:  Interpreted by Radiologist.  No orders to display       ------------------------- NURSING NOTES AND VITALS REVIEWED ---------------------------   The nursing notes within the ED encounter and vital signs as below have been reviewed. /77   Pulse 86   Temp 98.7 °F (37.1 °C) (Infrared)   Resp 18   Ht 5' 5\" (1.651 m)   Wt (!) 200 lb (90.7 kg)   SpO2 100%   BMI 33.28 kg/m²   Oxygen Saturation Interpretation: Normal      ---------------------------------------------------PHYSICAL EXAM--------------------------------------    Constitutional/General: Alert and oriented x3, well appearing, non toxic in NAD  Head: Normocephalic and atraumatic  Eyes: PERRL, EOMI, conjunctiva normal, sclera non icteric  Mouth: Oropharynx clear, handling secretions, no trismus, no asymmetry of the posterior oropharynx or uvular edema  Neck: Supple, full ROM, non tender to palpation in the midline, no stridor, no crepitus, no meningeal signs  Respiratory: Lungs clear to auscultation bilaterally, no wheezes, rales, or rhonchi. Not in respiratory distress  Cardiovascular:  Regular rate. Regular rhythm. No murmurs, gallops, or rubs. 2+ distal pulses  Chest: No chest wall tenderness  GI:  Abdomen Soft, Non tender, Non distended. +BS. No organomegaly, no palpable masses,  No rebound, guarding, or rigidity. Musculoskeletal: Moves all extremities x 4. Warm and well perfused, no clubbing, cyanosis, or edema. Capillary refill <3 seconds  Integument: skin warm and dry. No rashes. Lymphatic: no lymphadenopathy noted  Neurologic: GCS 15, no focal deficits, symmetric strength 5/5 in the upper and lower extremities bilaterally  Psychiatric: Normal Affect      ------------------------------ ED COURSE/MEDICAL DECISION MAKING----------------------  Medications - No data to display      Medical Decision Making:    Patient was seen and examined. Patient complaining of HA and wanted tested for COVID. Patient will be discharged home Covid negative  Counseling: The emergency provider has spoken with the patient and family member patient, mother, and father and discussed todays results, in addition to providing specific details for the plan of care and counseling regarding the diagnosis and prognosis.   Questions are answered at this time and they are agreeable with the plan.      --------------------------------- IMPRESSION AND DISPOSITION ---------------------------------    IMPRESSION  Exposure to covid    DISPOSITION  Disposition: Discharge to home  Patient condition is stable      Nasra Johnson MD  12/28/22 [id number]

## 2022-12-28 PROBLEM — Z20.822 EXPOSURE TO COVID-19 VIRUS: Status: ACTIVE | Noted: 2022-12-28

## 2022-12-29 ENCOUNTER — OFFICE VISIT (OUTPATIENT)
Dept: INTERNAL MEDICINE | Age: 72
End: 2022-12-29
Payer: MEDICARE

## 2022-12-29 VITALS
HEART RATE: 93 BPM | TEMPERATURE: 98.6 F | WEIGHT: 117.8 LBS | BODY MASS INDEX: 22.24 KG/M2 | DIASTOLIC BLOOD PRESSURE: 67 MMHG | SYSTOLIC BLOOD PRESSURE: 146 MMHG | OXYGEN SATURATION: 99 % | HEIGHT: 61 IN | RESPIRATION RATE: 20 BRPM

## 2022-12-29 DIAGNOSIS — F43.21 GRIEF: ICD-10-CM

## 2022-12-29 DIAGNOSIS — R51.9 ACUTE NONINTRACTABLE HEADACHE, UNSPECIFIED HEADACHE TYPE: ICD-10-CM

## 2022-12-29 DIAGNOSIS — I10 ESSENTIAL HYPERTENSION: Primary | ICD-10-CM

## 2022-12-29 PROCEDURE — 99212 OFFICE O/P EST SF 10 MIN: CPT | Performed by: INTERNAL MEDICINE

## 2022-12-29 ASSESSMENT — ENCOUNTER SYMPTOMS
COUGH: 0
ABDOMINAL PAIN: 0
BACK PAIN: 0
DIARRHEA: 0
VOMITING: 0
CONSTIPATION: 0
SHORTNESS OF BREATH: 0
BLOOD IN STOOL: 0
NAUSEA: 0

## 2022-12-29 NOTE — PROGRESS NOTES
[facility] Internal Medicine      SUBJECTIVE:  Chela Kumar (:  1950) is a 67 y.o. female here for evaluation of the following chief complaint(s):  2 Week Follow-Up, Hypertension (Patient started on Hydrazlaine 10 mg three times a day), Headache (Patient complains of headaches with sharp in her head at the top x 1 week ), Foot Pain (Patient complains  of left foot pain), and Leg Pain (Patient complains leg cramps in bilateral leg pain. Left side is worse)    Ms. Ghanshyam Asher is a 66year old female with PMHx of TIA, HTN, Aneurysm, Tobacco us, depression, GERD presents today to clinic for blood pressure check. Last visit, we added hydralazine 10 mg 3 times daily to her regimen for better blood pressure control. Since starting on new medication, patient experienced occipital and frontal headache, intermittent, severe, 9/10 on severity, lasting half an hour to an hour daily, radiating to the neck. No other relieving or exacerbating factors. No new tingling, weakness or numbness noticed patient denies fever, chills, blurred vision, double vision. He also complains of dizziness with standing quickly. Blood pressure is 146/67 today. Patient is on doxepin 50 mg nightly for sleep, BuSpar 5 mg daily for anxiety. Patient states dose medication does not help her symptoms and want to stop taking it. Referral to psychiatrist has been made from last visit. Review of Systems   Constitutional:  Positive for fatigue. Negative for chills and fever. HENT:  Negative for congestion. Respiratory:  Negative for cough and shortness of breath. Cardiovascular:  Negative for chest pain, palpitations and leg swelling. Gastrointestinal:  Negative for abdominal pain, blood in stool, constipation, diarrhea, nausea and vomiting. Musculoskeletal:  Negative for back pain and myalgias. Skin:  Negative for rash. Neurological:  Positive for light-headedness and headaches. Negative for dizziness. Psychiatric/Behavioral:  The patient is not nervous/anxious. Current Outpatient Medications on File Prior to Visit   Medication Sig Dispense Refill    ondansetron (ZOFRAN-ODT) 4 MG disintegrating tablet Take 1 tablet by mouth every 8 hours as needed for Nausea or Vomiting 30 tablet 0    lisinopril (PRINIVIL;ZESTRIL) 40 MG tablet Take 1 tablet by mouth daily 30 tablet 0    hydrALAZINE (APRESOLINE) 10 MG tablet Take 1 tablet by mouth 3 times daily 90 tablet 0    Phenazopyridine HCl (URISTAT ULTRA) 99.5 MG TABS Take by mouth      Misc. Devices KIT BP Cuff and Kit. Dx I10. Measure BP as instructed 1 kit 0    amLODIPine (NORVASC) 10 MG tablet Take 1 tablet by mouth nightly 30 tablet 1    clopidogrel (PLAVIX) 75 MG tablet Take 1 tablet by mouth daily 30 tablet 0    busPIRone (BUSPAR) 5 MG tablet Take 5 mg by mouth daily As needed      doxepin (SINEQUAN) 50 MG capsule Take 50 mg by mouth nightly      dicyclomine (BENTYL) 20 MG tablet Take 1 tablet by mouth every 6 hours as needed (Abdominal Cramping) (Patient taking differently: Take 20 mg by mouth in the morning and at bedtime) 120 tablet 3    albuterol sulfate HFA (PROVENTIL;VENTOLIN;PROAIR) 108 (90 Base) MCG/ACT inhaler Inhale 2 puffs into the lungs every 6 hours as needed for Wheezing      pantoprazole (PROTONIX) 40 MG tablet Take 40 mg by mouth daily      calcium carbonate (OS-CARMELITA) 1250 (500 Ca) MG chewable tablet Take 1 tablet by mouth 3 times daily      cefdinir (OMNICEF) 300 MG capsule  (Patient not taking: Reported on 12/29/2022)      cyanocobalamin 1000 MCG tablet Take 1,000 mcg by mouth daily (Patient not taking: No sig reported)      nicotine (NICODERM CQ) 7 MG/24HR Place 1 patch onto the skin every 24 hours for 14 days (Patient not taking: No sig reported) 30 patch 0     No current facility-administered medications on file prior to visit.        OBJECTIVE:    VS:   Vitals:    12/29/22 1459   BP: (!) 146/67   Site: Right Upper Arm Position: Sitting   Cuff Size: Medium Adult   Pulse: 93   Resp: 20   Temp: 98.6 °F (37 °C)   TempSrc: Temporal   SpO2: 99%   Weight: 117 lb 12.8 oz (53.4 kg)   Height: 5' 1\" (1.549 m)     Physical Exam  Constitutional:       General: She is not in acute distress. Appearance: She is well-developed. HENT:      Head: Normocephalic and atraumatic. Eyes:      General: No scleral icterus. Conjunctiva/sclera: Conjunctivae normal.   Neck:      Trachea: No tracheal deviation. Cardiovascular:      Rate and Rhythm: Normal rate. Heart sounds: No murmur heard. Pulmonary:      Effort: Pulmonary effort is normal. No respiratory distress. Breath sounds: Normal breath sounds. Abdominal:      General: Bowel sounds are normal. There is no distension. Palpations: Abdomen is soft. Tenderness: There is no abdominal tenderness. Musculoskeletal:         General: Normal range of motion. Cervical back: Normal range of motion. Skin:     General: Skin is warm and dry. Neurological:      Mental Status: She is alert and oriented to person, place, and time. Motor: Weakness present. Comments: Chronic kenan UE and LE weakness, strength 4/5, sensation intact  Left UE and LE strength 5/5, and sensation intact   Psychiatric:         Behavior: Behavior normal.         Thought Content: Thought content normal.         Judgment: Judgment normal.          ASSESSMENT/PLAN:  1. Essential hypertension     She has borderline orthostatic hypotension ( 155/71 ,pulse 97 sitting, 137/68, pulse 101 standing)    Will decrease Hydralazine to 10 mg BID, monitor BP at home daily. Will recheck BP in 2 weeks. Consider change Hydralazine to Bblocker if patient tolerates. 2. Acute nonintractable headache, unspecified headache type   Likely 2/2 hypotension vs Hydralazine side effects   Decrease Hydralazine, monitor symptom. Ctscan head wo contrast unremarkable 11/26    3.  Grief    Patient is on Buspar and Doxepin from previous PCP   Resend message to Jeannie Leon for follow up. Patient is to continue with current medications until evaluate by psychiatrist    RTC:  Return in about 2 weeks (around 1/12/2023) for Follow up with PCP. I have reviewed my findings and recommendations with Mac Pettit and Dr. You Linton.     Alicia Rosas MD   12/29/2022 3:38 PM

## 2022-12-29 NOTE — PATIENT INSTRUCTIONS
Dear Naz Coelho,        Thank you for coming to your appointment today. I hope we have addressed all of your needs. Please make sure to do the following:  - Decrease Hydralazine 10 mg to 2 times daily. Monitor blood pressure daily and when you have headache or dizziness. We will recheck in 2 weeks  - Continue your medications as listed. - Referrals have been made to [facility]:  If you do not hear from the office in 1 week, please call the number listed. - We will see each other again in 2 weeks      Have a great day!         Sincerely  Barbara Herbert M.D PGY-3  12/29/2022  3:47 PM

## 2022-12-29 NOTE — PROGRESS NOTES
[facility]    Attending Physician Statement  I have discussed the case, including pertinent history and exam findings with the resident physician. I agree with the assessment, plan and orders as documented by the resident. I have reviewed all pertinent PMHx, PSHx, FamHx, SocialHx, medications, and allergies and updated history as appropriate. Patient here for BP check. BP is improved with addition of hydralazine but patient has started having headaches and borderline orthostatics today. Did not tolerate hctz or BB in the past. Decrease hydralazine to BID. Reassess BP in 2 weeks with PCP. Remainder of medical problems as per resident note. Alexandria Paige MD  12/29/2022 3:39 PM

## 2023-01-12 ENCOUNTER — TELEPHONE (OUTPATIENT)
Dept: INTERNAL MEDICINE | Age: 73
End: 2023-01-12

## 2023-01-12 NOTE — TELEPHONE ENCOUNTER
SANTINOW attempted to call pt related to referral x2. SW provided contact information for return call.

## 2023-02-09 ENCOUNTER — TELEPHONE (OUTPATIENT)
Dept: INTERNAL MEDICINE | Age: 73
End: 2023-02-09

## 2023-02-09 NOTE — TELEPHONE ENCOUNTER
TITI made contact to pt related to referral. Pt reports she is doing okay and declined counseling services    Pt while on phone reported she is not feeling well is having aches and a sore throat.  SW to route to nursing

## 2023-02-10 ENCOUNTER — TELEPHONE (OUTPATIENT)
Dept: INTERNAL MEDICINE | Age: 73
End: 2023-02-10

## 2023-02-10 NOTE — TELEPHONE ENCOUNTER
Attempted to reach patient and no answer. Left message on voicemail stating to return call to office.

## 2023-03-16 ENCOUNTER — TELEPHONE (OUTPATIENT)
Dept: INTERNAL MEDICINE | Age: 73
End: 2023-03-16

## 2023-03-29 ENCOUNTER — TELEPHONE (OUTPATIENT)
Dept: INTERNAL MEDICINE | Age: 73
End: 2023-03-29

## 2023-03-29 NOTE — TELEPHONE ENCOUNTER
Notified by VERNON this AM of 416 pages of medical records that were faxed to our office along with a note that states \"Jaret Husain is no longer our pt. ..please do not send any more records to us. \"     Called and spoke with pt via phone. Pt states, yes she is trying to get an appointment scheduled in Dr. Eddie Padilla office but they need her medical records first. Pt states she did request her records be sent and signed authorization via patient portal to have them sent. Pt notified that records were sent however, they were sent back to our office along with note that she is no longer their patient. Pt states I know, I am trying to schedule but they need the records. Pt asking if records could be picked up. Notified can only be released to Dr's office. Pt notified this nurse would call new PCP office for further clarification. Message left with  requesting a return call regarding medical records that were sent to office and returned via fax. done

## 2023-03-30 NOTE — TELEPHONE ENCOUNTER
Received call back 3/29/23 from  regarding patient. States pt was a former patient. Notified pt wished to re establish in the office. Per Doctor, pt is not able to re establish in office. Asked if patient was aware of this. States she believes pt is aware but that she would make sure patient was notified of same. Will follow up with pt 3/30/23.

## 2023-03-31 NOTE — TELEPHONE ENCOUNTER
Called and spoke with pt via phone. Pt states Dr Artie Means office did not call her. Notified this nurse was told pt unable to be scheduled in their office. Asked pt if she had another PCP to call or did she wish to reschedule in this office. Requesting she schedule in this office.  Appt made for next week in afternoon per her request.

## 2023-04-13 ENCOUNTER — OFFICE VISIT (OUTPATIENT)
Dept: INTERNAL MEDICINE | Age: 73
End: 2023-04-13
Payer: MEDICARE

## 2023-04-13 VITALS
HEIGHT: 62 IN | TEMPERATURE: 97.6 F | SYSTOLIC BLOOD PRESSURE: 161 MMHG | HEART RATE: 89 BPM | RESPIRATION RATE: 18 BRPM | DIASTOLIC BLOOD PRESSURE: 65 MMHG | WEIGHT: 127 LBS | BODY MASS INDEX: 23.37 KG/M2 | OXYGEN SATURATION: 99 %

## 2023-04-13 DIAGNOSIS — Z72.0 TOBACCO ABUSE: ICD-10-CM

## 2023-04-13 DIAGNOSIS — I10 PRIMARY HYPERTENSION: Primary | ICD-10-CM

## 2023-04-13 DIAGNOSIS — K21.9 GASTROESOPHAGEAL REFLUX DISEASE WITHOUT ESOPHAGITIS: ICD-10-CM

## 2023-04-13 DIAGNOSIS — I63.81 BASAL GANGLIA INFARCTION (HCC): ICD-10-CM

## 2023-04-13 DIAGNOSIS — I63.9 CEREBROVASCULAR ACCIDENT (CVA), UNSPECIFIED MECHANISM (HCC): ICD-10-CM

## 2023-04-13 DIAGNOSIS — F32.A DEPRESSION, UNSPECIFIED DEPRESSION TYPE: ICD-10-CM

## 2023-04-13 DIAGNOSIS — E78.49 OTHER HYPERLIPIDEMIA: ICD-10-CM

## 2023-04-13 DIAGNOSIS — G45.9 TIA (TRANSIENT ISCHEMIC ATTACK): ICD-10-CM

## 2023-04-13 DIAGNOSIS — E78.5 HYPERLIPIDEMIA, UNSPECIFIED HYPERLIPIDEMIA TYPE: ICD-10-CM

## 2023-04-13 PROBLEM — N17.9 AKI (ACUTE KIDNEY INJURY) (HCC): Status: RESOLVED | Noted: 2022-11-26 | Resolved: 2023-04-13

## 2023-04-13 PROBLEM — Z20.822 EXPOSURE TO COVID-19 VIRUS: Status: RESOLVED | Noted: 2022-12-28 | Resolved: 2023-04-13

## 2023-04-13 PROCEDURE — 99212 OFFICE O/P EST SF 10 MIN: CPT | Performed by: NURSE PRACTITIONER

## 2023-04-13 PROCEDURE — 3074F SYST BP LT 130 MM HG: CPT | Performed by: INTERNAL MEDICINE

## 2023-04-13 PROCEDURE — 99214 OFFICE O/P EST MOD 30 MIN: CPT | Performed by: INTERNAL MEDICINE

## 2023-04-13 PROCEDURE — 1123F ACP DISCUSS/DSCN MKR DOCD: CPT | Performed by: INTERNAL MEDICINE

## 2023-04-13 PROCEDURE — 3078F DIAST BP <80 MM HG: CPT | Performed by: INTERNAL MEDICINE

## 2023-04-13 RX ORDER — GABAPENTIN 300 MG/1
CAPSULE ORAL
COMMUNITY
Start: 2023-02-18 | End: 2023-04-13 | Stop reason: SDUPTHER

## 2023-04-13 RX ORDER — CLOPIDOGREL BISULFATE 75 MG/1
75 TABLET ORAL DAILY
Qty: 30 TABLET | Refills: 0 | Status: SHIPPED | OUTPATIENT
Start: 2023-04-13

## 2023-04-13 RX ORDER — ATORVASTATIN CALCIUM 40 MG/1
40 TABLET, FILM COATED ORAL DAILY
Qty: 90 TABLET | Refills: 1 | Status: SHIPPED | OUTPATIENT
Start: 2023-04-13

## 2023-04-13 RX ORDER — LISINOPRIL 40 MG/1
40 TABLET ORAL DAILY
Qty: 30 TABLET | Refills: 0 | Status: SHIPPED | OUTPATIENT
Start: 2023-04-13

## 2023-04-13 RX ORDER — HYDRALAZINE HYDROCHLORIDE 10 MG/1
10 TABLET, FILM COATED ORAL 3 TIMES DAILY
Qty: 90 TABLET | Refills: 0 | Status: SHIPPED | OUTPATIENT
Start: 2023-04-13

## 2023-04-13 RX ORDER — INDAPAMIDE 2.5 MG/1
TABLET, FILM COATED ORAL
COMMUNITY
Start: 2023-02-18

## 2023-04-13 RX ORDER — GABAPENTIN 300 MG/1
300 CAPSULE ORAL 3 TIMES DAILY
Qty: 90 CAPSULE | Refills: 2 | Status: SHIPPED | OUTPATIENT
Start: 2023-04-13 | End: 2023-07-12

## 2023-04-13 RX ORDER — PANTOPRAZOLE SODIUM 40 MG/1
40 TABLET, DELAYED RELEASE ORAL DAILY
Qty: 90 TABLET | Refills: 1 | Status: SHIPPED | OUTPATIENT
Start: 2023-04-13

## 2023-04-13 RX ORDER — AMLODIPINE BESYLATE 10 MG/1
10 TABLET ORAL NIGHTLY
Qty: 30 TABLET | Refills: 1 | Status: SHIPPED | OUTPATIENT
Start: 2023-04-13

## 2023-04-13 ASSESSMENT — ENCOUNTER SYMPTOMS
DIARRHEA: 0
COUGH: 0
ABDOMINAL PAIN: 0
SHORTNESS OF BREATH: 0
CONSTIPATION: 0
NAUSEA: 0
BLOOD IN STOOL: 0
VOMITING: 0
BACK PAIN: 0

## 2023-04-13 ASSESSMENT — PATIENT HEALTH QUESTIONNAIRE - PHQ9
SUM OF ALL RESPONSES TO PHQ QUESTIONS 1-9: 0
SUM OF ALL RESPONSES TO PHQ9 QUESTIONS 1 & 2: 0
SUM OF ALL RESPONSES TO PHQ QUESTIONS 1-9: 0
1. LITTLE INTEREST OR PLEASURE IN DOING THINGS: 0
2. FEELING DOWN, DEPRESSED OR HOPELESS: 0
SUM OF ALL RESPONSES TO PHQ QUESTIONS 1-9: 0
SUM OF ALL RESPONSES TO PHQ QUESTIONS 1-9: 0

## 2023-04-20 ENCOUNTER — TELEPHONE (OUTPATIENT)
Dept: CARDIOLOGY CLINIC | Age: 73
End: 2023-04-20

## 2023-05-17 DIAGNOSIS — G45.9 TIA (TRANSIENT ISCHEMIC ATTACK): ICD-10-CM

## 2023-05-17 DIAGNOSIS — I10 PRIMARY HYPERTENSION: ICD-10-CM

## 2023-05-17 RX ORDER — CLOPIDOGREL BISULFATE 75 MG/1
75 TABLET ORAL DAILY
Qty: 90 TABLET | Refills: 1 | Status: SHIPPED | OUTPATIENT
Start: 2023-05-17

## 2023-05-17 RX ORDER — LISINOPRIL 40 MG/1
40 TABLET ORAL DAILY
Qty: 90 TABLET | Refills: 1 | Status: SHIPPED | OUTPATIENT
Start: 2023-05-17

## 2023-07-19 ENCOUNTER — TELEPHONE (OUTPATIENT)
Dept: INTERNAL MEDICINE | Age: 73
End: 2023-07-19

## 2023-07-19 NOTE — TELEPHONE ENCOUNTER
Pt last seen in April no future appts. Pt called for norvasc refill. Pt now see's another provider. Explained to pt she would need to obtain refill from her current pcp. Pt states Dr is on vacation. I advised pt we can not fill med as she has established with a new provider and also has not been seen here since April.  If pt returns she will be a new pt

## 2023-09-17 ENCOUNTER — APPOINTMENT (OUTPATIENT)
Dept: GENERAL RADIOLOGY | Age: 73
DRG: 103 | End: 2023-09-17
Payer: MEDICARE

## 2023-09-17 ENCOUNTER — APPOINTMENT (OUTPATIENT)
Dept: CT IMAGING | Age: 73
DRG: 103 | End: 2023-09-17
Payer: MEDICARE

## 2023-09-17 ENCOUNTER — HOSPITAL ENCOUNTER (INPATIENT)
Age: 73
LOS: 1 days | Discharge: LEFT BEFORE DISCHARGE | DRG: 103 | End: 2023-09-19
Attending: STUDENT IN AN ORGANIZED HEALTH CARE EDUCATION/TRAINING PROGRAM | Admitting: INTERNAL MEDICINE
Payer: MEDICARE

## 2023-09-17 DIAGNOSIS — R41.82 ALTERED MENTAL STATUS, UNSPECIFIED ALTERED MENTAL STATUS TYPE: Primary | ICD-10-CM

## 2023-09-17 LAB
ALBUMIN SERPL-MCNC: 3.8 G/DL (ref 3.5–5.2)
ALP SERPL-CCNC: 37 U/L (ref 35–104)
ALT SERPL-CCNC: 11 U/L (ref 0–32)
ANION GAP SERPL CALCULATED.3IONS-SCNC: 10 MMOL/L (ref 7–16)
APAP SERPL-MCNC: <5 UG/ML (ref 10–30)
AST SERPL-CCNC: 14 U/L (ref 0–31)
B.E.: 0.2 MMOL/L (ref -3–3)
BASOPHILS # BLD: 0.03 K/UL (ref 0–0.2)
BASOPHILS NFR BLD: 0 % (ref 0–2)
BILIRUB SERPL-MCNC: 0.3 MG/DL (ref 0–1.2)
BNP SERPL-MCNC: 630 PG/ML (ref 0–125)
BUN SERPL-MCNC: 31 MG/DL (ref 6–23)
CALCIUM SERPL-MCNC: 9.2 MG/DL (ref 8.6–10.2)
CHLORIDE SERPL-SCNC: 105 MMOL/L (ref 98–107)
CO2 SERPL-SCNC: 25 MMOL/L (ref 22–29)
COHB: 2.6 % (ref 0–1.5)
CREAT SERPL-MCNC: 1.2 MG/DL (ref 0.5–1)
CRITICAL: ABNORMAL
DATE ANALYZED: ABNORMAL
DATE OF COLLECTION: ABNORMAL
EOSINOPHIL # BLD: 0.09 K/UL (ref 0.05–0.5)
EOSINOPHILS RELATIVE PERCENT: 1 % (ref 0–6)
ERYTHROCYTE [DISTWIDTH] IN BLOOD BY AUTOMATED COUNT: 13.4 % (ref 11.5–15)
ETHANOLAMINE SERPL-MCNC: <10 MG/DL
GFR SERPL CREATININE-BSD FRML MDRD: 50 ML/MIN/1.73M2
GLUCOSE BLD-MCNC: 121 MG/DL (ref 74–99)
GLUCOSE SERPL-MCNC: 100 MG/DL (ref 74–99)
HCO3: 24.1 MMOL/L (ref 22–26)
HCT VFR BLD AUTO: 35.6 % (ref 34–48)
HGB BLD-MCNC: 11.4 G/DL (ref 11.5–15.5)
HHB: 4.5 % (ref 0–5)
IMM GRANULOCYTES # BLD AUTO: 0.04 K/UL (ref 0–0.58)
IMM GRANULOCYTES NFR BLD: 0 % (ref 0–5)
LAB: ABNORMAL
LYMPHOCYTES NFR BLD: 3.19 K/UL (ref 1.5–4)
LYMPHOCYTES RELATIVE PERCENT: 25 % (ref 20–42)
Lab: ABNORMAL
MCH RBC QN AUTO: 31.8 PG (ref 26–35)
MCHC RBC AUTO-ENTMCNC: 32 G/DL (ref 32–34.5)
MCV RBC AUTO: 99.2 FL (ref 80–99.9)
METHB: 0.3 % (ref 0–1.5)
MONOCYTES NFR BLD: 0.68 K/UL (ref 0.1–0.95)
MONOCYTES NFR BLD: 5 % (ref 2–12)
NEUTROPHILS NFR BLD: 69 % (ref 43–80)
NEUTS SEG NFR BLD: 9 K/UL (ref 1.8–7.3)
O2 CONTENT: 15 ML/DL
O2 SATURATION: 95.4 % (ref 92–98.5)
O2HB: 92.6 % (ref 94–97)
OPERATOR ID: 5100
PATIENT TEMP: 37 C
PCO2: 36.7 MMHG (ref 35–45)
PH BLOOD GAS: 7.44 (ref 7.35–7.45)
PLATELET # BLD AUTO: 322 K/UL (ref 130–450)
PMV BLD AUTO: 9.5 FL (ref 7–12)
PO2: 77.6 MMHG (ref 75–100)
POTASSIUM SERPL-SCNC: 3.7 MMOL/L (ref 3.5–5)
PROT SERPL-MCNC: 7.2 G/DL (ref 6.4–8.3)
RBC # BLD AUTO: 3.59 M/UL (ref 3.5–5.5)
SALICYLATES SERPL-MCNC: <0.3 MG/DL (ref 0–30)
SODIUM SERPL-SCNC: 140 MMOL/L (ref 132–146)
SOURCE, BLOOD GAS: ABNORMAL
THB: 11.5 G/DL (ref 11.5–16.5)
TIME ANALYZED: 1850
TOXIC TRICYCLIC SC,BLOOD: NEGATIVE
TROPONIN I SERPL HS-MCNC: 18 NG/L (ref 0–9)
TROPONIN I SERPL HS-MCNC: 19 NG/L (ref 0–9)
WBC OTHER # BLD: 13 K/UL (ref 4.5–11.5)

## 2023-09-17 PROCEDURE — 80143 DRUG ASSAY ACETAMINOPHEN: CPT

## 2023-09-17 PROCEDURE — 70450 CT HEAD/BRAIN W/O DYE: CPT

## 2023-09-17 PROCEDURE — 93005 ELECTROCARDIOGRAM TRACING: CPT

## 2023-09-17 PROCEDURE — 84484 ASSAY OF TROPONIN QUANT: CPT

## 2023-09-17 PROCEDURE — 83880 ASSAY OF NATRIURETIC PEPTIDE: CPT

## 2023-09-17 PROCEDURE — 82805 BLOOD GASES W/O2 SATURATION: CPT

## 2023-09-17 PROCEDURE — 82962 GLUCOSE BLOOD TEST: CPT

## 2023-09-17 PROCEDURE — 6360000002 HC RX W HCPCS

## 2023-09-17 PROCEDURE — 80053 COMPREHEN METABOLIC PANEL: CPT

## 2023-09-17 PROCEDURE — 80307 DRUG TEST PRSMV CHEM ANLYZR: CPT

## 2023-09-17 PROCEDURE — 85025 COMPLETE CBC W/AUTO DIFF WBC: CPT

## 2023-09-17 PROCEDURE — 96375 TX/PRO/DX INJ NEW DRUG ADDON: CPT

## 2023-09-17 PROCEDURE — 80179 DRUG ASSAY SALICYLATE: CPT

## 2023-09-17 PROCEDURE — 99285 EMERGENCY DEPT VISIT HI MDM: CPT

## 2023-09-17 PROCEDURE — 71045 X-RAY EXAM CHEST 1 VIEW: CPT

## 2023-09-17 PROCEDURE — 96374 THER/PROPH/DIAG INJ IV PUSH: CPT

## 2023-09-17 PROCEDURE — G0480 DRUG TEST DEF 1-7 CLASSES: HCPCS

## 2023-09-17 RX ORDER — ACETAMINOPHEN 325 MG/1
650 TABLET ORAL ONCE
Status: DISCONTINUED | OUTPATIENT
Start: 2023-09-17 | End: 2023-09-17

## 2023-09-17 RX ORDER — 0.9 % SODIUM CHLORIDE 0.9 %
1000 INTRAVENOUS SOLUTION INTRAVENOUS ONCE
Status: COMPLETED | OUTPATIENT
Start: 2023-09-18 | End: 2023-09-18

## 2023-09-17 RX ORDER — MORPHINE SULFATE 4 MG/ML
4 INJECTION, SOLUTION INTRAMUSCULAR; INTRAVENOUS ONCE
Status: COMPLETED | OUTPATIENT
Start: 2023-09-17 | End: 2023-09-17

## 2023-09-17 RX ORDER — METOCLOPRAMIDE HYDROCHLORIDE 5 MG/ML
10 INJECTION INTRAMUSCULAR; INTRAVENOUS ONCE
Status: COMPLETED | OUTPATIENT
Start: 2023-09-17 | End: 2023-09-17

## 2023-09-17 RX ADMIN — MORPHINE SULFATE 4 MG: 4 INJECTION, SOLUTION INTRAMUSCULAR; INTRAVENOUS at 23:49

## 2023-09-17 RX ADMIN — METOCLOPRAMIDE 10 MG: 5 INJECTION, SOLUTION INTRAMUSCULAR; INTRAVENOUS at 23:49

## 2023-09-17 NOTE — ED NOTES
Bedside shift report received by WINNIE Scanlon, assumed Pt care at this time. Pt resting comfortably showing no signs of distress.       Bk Bustillo RN  09/17/23 [id number]

## 2023-09-17 NOTE — ED NOTES
Pt's daughter-in-law Carmina Franklin would like to be updated when possible [phone].       Francella Aase, RN  09/17/23 2694

## 2023-09-18 ENCOUNTER — APPOINTMENT (OUTPATIENT)
Dept: GENERAL RADIOLOGY | Age: 73
DRG: 103 | End: 2023-09-18
Payer: MEDICARE

## 2023-09-18 PROBLEM — R41.82 AMS (ALTERED MENTAL STATUS): Status: ACTIVE | Noted: 2023-09-18

## 2023-09-18 PROBLEM — R41.82 ALTERED MENTAL STATUS: Status: ACTIVE | Noted: 2023-09-18

## 2023-09-18 LAB
AMMONIA PLAS-SCNC: 14 UMOL/L (ref 11–51)
AMPHET UR QL SCN: NEGATIVE
ANION GAP SERPL CALCULATED.3IONS-SCNC: 12 MMOL/L (ref 7–16)
ANION GAP SERPL CALCULATED.3IONS-SCNC: 8 MMOL/L (ref 7–16)
BACTERIA URNS QL MICRO: ABNORMAL
BARBITURATES UR QL SCN: NEGATIVE
BASOPHILS # BLD: 0.07 K/UL (ref 0–0.2)
BASOPHILS NFR BLD: 1 % (ref 0–2)
BENZODIAZ UR QL: POSITIVE
BILIRUB UR QL STRIP: NEGATIVE
BNP SERPL-MCNC: 616 PG/ML (ref 0–125)
BUN SERPL-MCNC: 18 MG/DL (ref 6–23)
BUN SERPL-MCNC: 24 MG/DL (ref 6–23)
BUPRENORPHINE UR QL: NEGATIVE
CALCIUM SERPL-MCNC: 9 MG/DL (ref 8.6–10.2)
CALCIUM SERPL-MCNC: 9.2 MG/DL (ref 8.6–10.2)
CANNABINOIDS UR QL SCN: NEGATIVE
CHLORIDE SERPL-SCNC: 103 MMOL/L (ref 98–107)
CHLORIDE SERPL-SCNC: 106 MMOL/L (ref 98–107)
CLARITY UR: CLEAR
CO2 SERPL-SCNC: 25 MMOL/L (ref 22–29)
CO2 SERPL-SCNC: 26 MMOL/L (ref 22–29)
COCAINE UR QL SCN: NEGATIVE
COLOR UR: YELLOW
CREAT SERPL-MCNC: 0.9 MG/DL (ref 0.5–1)
CREAT SERPL-MCNC: 0.9 MG/DL (ref 0.5–1)
EKG ATRIAL RATE: 71 BPM
EKG ATRIAL RATE: 89 BPM
EKG ATRIAL RATE: 93 BPM
EKG P AXIS: 43 DEGREES
EKG P AXIS: 68 DEGREES
EKG P AXIS: 70 DEGREES
EKG P-R INTERVAL: 128 MS
EKG P-R INTERVAL: 138 MS
EKG P-R INTERVAL: 140 MS
EKG Q-T INTERVAL: 364 MS
EKG Q-T INTERVAL: 372 MS
EKG Q-T INTERVAL: 386 MS
EKG QRS DURATION: 76 MS
EKG QRS DURATION: 76 MS
EKG QRS DURATION: 82 MS
EKG QTC CALCULATION (BAZETT): 419 MS
EKG QTC CALCULATION (BAZETT): 452 MS
EKG QTC CALCULATION (BAZETT): 452 MS
EKG R AXIS: 43 DEGREES
EKG R AXIS: 64 DEGREES
EKG R AXIS: 69 DEGREES
EKG T AXIS: 27 DEGREES
EKG T AXIS: 68 DEGREES
EKG T AXIS: 8 DEGREES
EKG VENTRICULAR RATE: 71 BPM
EKG VENTRICULAR RATE: 89 BPM
EKG VENTRICULAR RATE: 93 BPM
EOSINOPHIL # BLD: 0.23 K/UL (ref 0.05–0.5)
EOSINOPHILS RELATIVE PERCENT: 2 % (ref 0–6)
EPI CELLS #/AREA URNS HPF: ABNORMAL /HPF
ERYTHROCYTE [DISTWIDTH] IN BLOOD BY AUTOMATED COUNT: 13.5 % (ref 11.5–15)
FENTANYL UR QL: POSITIVE
GFR SERPL CREATININE-BSD FRML MDRD: >60 ML/MIN/1.73M2
GFR SERPL CREATININE-BSD FRML MDRD: >60 ML/MIN/1.73M2
GLUCOSE SERPL-MCNC: 105 MG/DL (ref 74–99)
GLUCOSE SERPL-MCNC: 135 MG/DL (ref 74–99)
GLUCOSE UR STRIP-MCNC: NEGATIVE MG/DL
HCT VFR BLD AUTO: 37.7 % (ref 34–48)
HGB BLD-MCNC: 12 G/DL (ref 11.5–15.5)
HGB UR QL STRIP.AUTO: NEGATIVE
IMM GRANULOCYTES # BLD AUTO: 0.07 K/UL (ref 0–0.58)
IMM GRANULOCYTES NFR BLD: 1 % (ref 0–5)
KETONES UR STRIP-MCNC: NEGATIVE MG/DL
LACTATE BLDV-SCNC: 1.1 MMOL/L (ref 0.5–1.9)
LACTATE BLDV-SCNC: 1.1 MMOL/L (ref 0.5–2.2)
LACTATE BLDV-SCNC: 2.1 MMOL/L (ref 0.5–1.9)
LEUKOCYTE ESTERASE UR QL STRIP: ABNORMAL
LYMPHOCYTES NFR BLD: 4.4 K/UL (ref 1.5–4)
LYMPHOCYTES RELATIVE PERCENT: 35 % (ref 20–42)
MAGNESIUM SERPL-MCNC: 2 MG/DL (ref 1.6–2.6)
MAGNESIUM SERPL-MCNC: 2.1 MG/DL (ref 1.6–2.6)
MCH RBC QN AUTO: 31.7 PG (ref 26–35)
MCHC RBC AUTO-ENTMCNC: 31.8 G/DL (ref 32–34.5)
MCV RBC AUTO: 99.7 FL (ref 80–99.9)
METHADONE UR QL: NEGATIVE
MONOCYTES NFR BLD: 0.69 K/UL (ref 0.1–0.95)
MONOCYTES NFR BLD: 6 % (ref 2–12)
NEUTROPHILS NFR BLD: 57 % (ref 43–80)
NEUTS SEG NFR BLD: 7.11 K/UL (ref 1.8–7.3)
NITRITE UR QL STRIP: NEGATIVE
OPIATES UR QL SCN: POSITIVE
OXYCODONE UR QL SCN: NEGATIVE
PCP UR QL SCN: NEGATIVE
PH UR STRIP: 6 [PH] (ref 5–9)
PLATELET # BLD AUTO: 357 K/UL (ref 130–450)
PMV BLD AUTO: 9.3 FL (ref 7–12)
POTASSIUM SERPL-SCNC: 3.4 MMOL/L (ref 3.5–5)
POTASSIUM SERPL-SCNC: 3.6 MMOL/L (ref 3.5–5)
PROT UR STRIP-MCNC: ABNORMAL MG/DL
RBC # BLD AUTO: 3.78 M/UL (ref 3.5–5.5)
RBC #/AREA URNS HPF: ABNORMAL /HPF
SODIUM SERPL-SCNC: 140 MMOL/L (ref 132–146)
SODIUM SERPL-SCNC: 140 MMOL/L (ref 132–146)
SP GR UR STRIP: 1.01 (ref 1–1.03)
T4 FREE SERPL-MCNC: 1.3 NG/DL (ref 0.9–1.7)
TEST INFORMATION: ABNORMAL
TROPONIN I SERPL HS-MCNC: 17 NG/L (ref 0–9)
TROPONIN I SERPL HS-MCNC: 18 NG/L (ref 0–9)
TSH SERPL DL<=0.05 MIU/L-ACNC: 6.11 UIU/ML (ref 0.27–4.2)
UROBILINOGEN UR STRIP-ACNC: 2 EU/DL (ref 0–1)
WBC #/AREA URNS HPF: ABNORMAL /HPF
WBC OTHER # BLD: 12.6 K/UL (ref 4.5–11.5)

## 2023-09-18 PROCEDURE — 83880 ASSAY OF NATRIURETIC PEPTIDE: CPT

## 2023-09-18 PROCEDURE — 81001 URINALYSIS AUTO W/SCOPE: CPT

## 2023-09-18 PROCEDURE — 6360000002 HC RX W HCPCS: Performed by: INTERNAL MEDICINE

## 2023-09-18 PROCEDURE — 82140 ASSAY OF AMMONIA: CPT

## 2023-09-18 PROCEDURE — 83605 ASSAY OF LACTIC ACID: CPT

## 2023-09-18 PROCEDURE — G0378 HOSPITAL OBSERVATION PER HR: HCPCS

## 2023-09-18 PROCEDURE — 2580000003 HC RX 258: Performed by: INTERNAL MEDICINE

## 2023-09-18 PROCEDURE — 96376 TX/PRO/DX INJ SAME DRUG ADON: CPT

## 2023-09-18 PROCEDURE — 96375 TX/PRO/DX INJ NEW DRUG ADDON: CPT

## 2023-09-18 PROCEDURE — 93010 ELECTROCARDIOGRAM REPORT: CPT | Performed by: INTERNAL MEDICINE

## 2023-09-18 PROCEDURE — 87040 BLOOD CULTURE FOR BACTERIA: CPT

## 2023-09-18 PROCEDURE — 94640 AIRWAY INHALATION TREATMENT: CPT

## 2023-09-18 PROCEDURE — 84439 ASSAY OF FREE THYROXINE: CPT

## 2023-09-18 PROCEDURE — 80307 DRUG TEST PRSMV CHEM ANLYZR: CPT

## 2023-09-18 PROCEDURE — 83735 ASSAY OF MAGNESIUM: CPT

## 2023-09-18 PROCEDURE — 84443 ASSAY THYROID STIM HORMONE: CPT

## 2023-09-18 PROCEDURE — 2060000000 HC ICU INTERMEDIATE R&B

## 2023-09-18 PROCEDURE — 71045 X-RAY EXAM CHEST 1 VIEW: CPT

## 2023-09-18 PROCEDURE — 6370000000 HC RX 637 (ALT 250 FOR IP): Performed by: INTERNAL MEDICINE

## 2023-09-18 PROCEDURE — 84484 ASSAY OF TROPONIN QUANT: CPT

## 2023-09-18 PROCEDURE — 6370000000 HC RX 637 (ALT 250 FOR IP)

## 2023-09-18 PROCEDURE — 80048 BASIC METABOLIC PNL TOTAL CA: CPT

## 2023-09-18 PROCEDURE — 6360000002 HC RX W HCPCS

## 2023-09-18 PROCEDURE — 2580000003 HC RX 258

## 2023-09-18 PROCEDURE — 82607 VITAMIN B-12: CPT

## 2023-09-18 PROCEDURE — 82746 ASSAY OF FOLIC ACID SERUM: CPT

## 2023-09-18 PROCEDURE — 93005 ELECTROCARDIOGRAM TRACING: CPT | Performed by: INTERNAL MEDICINE

## 2023-09-18 PROCEDURE — 36415 COLL VENOUS BLD VENIPUNCTURE: CPT

## 2023-09-18 PROCEDURE — 85025 COMPLETE CBC W/AUTO DIFF WBC: CPT

## 2023-09-18 RX ORDER — AMLODIPINE BESYLATE 5 MG/1
10 TABLET ORAL ONCE
Status: COMPLETED | OUTPATIENT
Start: 2023-09-18 | End: 2023-09-18

## 2023-09-18 RX ORDER — HYDRALAZINE HYDROCHLORIDE 10 MG/1
10 TABLET, FILM COATED ORAL 3 TIMES DAILY
Status: DISCONTINUED | OUTPATIENT
Start: 2023-09-18 | End: 2023-09-19 | Stop reason: HOSPADM

## 2023-09-18 RX ORDER — MORPHINE SULFATE 2 MG/ML
2 INJECTION, SOLUTION INTRAMUSCULAR; INTRAVENOUS
Status: DISCONTINUED | OUTPATIENT
Start: 2023-09-18 | End: 2023-09-18

## 2023-09-18 RX ORDER — SODIUM CHLORIDE 0.9 % (FLUSH) 0.9 %
5-40 SYRINGE (ML) INJECTION EVERY 12 HOURS SCHEDULED
Status: DISCONTINUED | OUTPATIENT
Start: 2023-09-18 | End: 2023-09-19 | Stop reason: HOSPADM

## 2023-09-18 RX ORDER — SODIUM CHLORIDE 0.9 % (FLUSH) 0.9 %
5-40 SYRINGE (ML) INJECTION PRN
Status: DISCONTINUED | OUTPATIENT
Start: 2023-09-18 | End: 2023-09-19 | Stop reason: HOSPADM

## 2023-09-18 RX ORDER — CLOPIDOGREL BISULFATE 75 MG/1
75 TABLET ORAL DAILY
Status: DISCONTINUED | OUTPATIENT
Start: 2023-09-18 | End: 2023-09-18

## 2023-09-18 RX ORDER — BUSPIRONE HYDROCHLORIDE 5 MG/1
5 TABLET ORAL DAILY
Status: DISCONTINUED | OUTPATIENT
Start: 2023-09-18 | End: 2023-09-19 | Stop reason: HOSPADM

## 2023-09-18 RX ORDER — DICYCLOMINE HCL 20 MG
20 TABLET ORAL EVERY 6 HOURS PRN
COMMUNITY

## 2023-09-18 RX ORDER — AMLODIPINE BESYLATE 10 MG/1
10 TABLET ORAL NIGHTLY
Status: DISCONTINUED | OUTPATIENT
Start: 2023-09-18 | End: 2023-09-19 | Stop reason: HOSPADM

## 2023-09-18 RX ORDER — HYDRALAZINE HYDROCHLORIDE 20 MG/ML
20 INJECTION INTRAMUSCULAR; INTRAVENOUS
Status: CANCELLED | OUTPATIENT
Start: 2023-09-18

## 2023-09-18 RX ORDER — HEPARIN SODIUM 10000 [USP'U]/ML
5000 INJECTION, SOLUTION INTRAVENOUS; SUBCUTANEOUS EVERY 8 HOURS SCHEDULED
Status: DISCONTINUED | OUTPATIENT
Start: 2023-09-18 | End: 2023-09-19 | Stop reason: HOSPADM

## 2023-09-18 RX ORDER — HYDRALAZINE HYDROCHLORIDE 20 MG/ML
10 INJECTION INTRAMUSCULAR; INTRAVENOUS
Status: DISCONTINUED | OUTPATIENT
Start: 2023-09-18 | End: 2023-09-19 | Stop reason: HOSPADM

## 2023-09-18 RX ORDER — ONDANSETRON 2 MG/ML
4 INJECTION INTRAMUSCULAR; INTRAVENOUS EVERY 6 HOURS PRN
Status: DISCONTINUED | OUTPATIENT
Start: 2023-09-18 | End: 2023-09-19 | Stop reason: HOSPADM

## 2023-09-18 RX ORDER — 0.9 % SODIUM CHLORIDE 0.9 %
1000 INTRAVENOUS SOLUTION INTRAVENOUS ONCE
Status: COMPLETED | OUTPATIENT
Start: 2023-09-18 | End: 2023-09-18

## 2023-09-18 RX ORDER — ALBUTEROL SULFATE 2.5 MG/3ML
2.5 SOLUTION RESPIRATORY (INHALATION) EVERY 6 HOURS PRN
Status: DISCONTINUED | OUTPATIENT
Start: 2023-09-18 | End: 2023-09-19 | Stop reason: HOSPADM

## 2023-09-18 RX ORDER — ACETAMINOPHEN 650 MG/1
650 SUPPOSITORY RECTAL EVERY 6 HOURS PRN
Status: DISCONTINUED | OUTPATIENT
Start: 2023-09-18 | End: 2023-09-19 | Stop reason: HOSPADM

## 2023-09-18 RX ORDER — LISINOPRIL 20 MG/1
40 TABLET ORAL DAILY
Status: DISCONTINUED | OUTPATIENT
Start: 2023-09-19 | End: 2023-09-19 | Stop reason: HOSPADM

## 2023-09-18 RX ORDER — ACETAMINOPHEN 325 MG/1
650 TABLET ORAL EVERY 6 HOURS PRN
Status: DISCONTINUED | OUTPATIENT
Start: 2023-09-18 | End: 2023-09-19 | Stop reason: HOSPADM

## 2023-09-18 RX ORDER — ALBUTEROL SULFATE 2.5 MG/3ML
2.5 SOLUTION RESPIRATORY (INHALATION) EVERY 4 HOURS PRN
Status: DISCONTINUED | OUTPATIENT
Start: 2023-09-18 | End: 2023-09-19 | Stop reason: HOSPADM

## 2023-09-18 RX ORDER — GABAPENTIN 300 MG/1
300 CAPSULE ORAL 3 TIMES DAILY PRN
COMMUNITY

## 2023-09-18 RX ORDER — CLOPIDOGREL BISULFATE 75 MG/1
75 TABLET ORAL DAILY
Status: DISCONTINUED | OUTPATIENT
Start: 2023-09-19 | End: 2023-09-18

## 2023-09-18 RX ORDER — LISINOPRIL 10 MG/1
40 TABLET ORAL ONCE
Status: COMPLETED | OUTPATIENT
Start: 2023-09-18 | End: 2023-09-18

## 2023-09-18 RX ORDER — ONDANSETRON 4 MG/1
4 TABLET, ORALLY DISINTEGRATING ORAL EVERY 8 HOURS PRN
Status: DISCONTINUED | OUTPATIENT
Start: 2023-09-18 | End: 2023-09-19 | Stop reason: HOSPADM

## 2023-09-18 RX ORDER — HYDRALAZINE HYDROCHLORIDE 20 MG/ML
20 INJECTION INTRAMUSCULAR; INTRAVENOUS ONCE
Status: COMPLETED | OUTPATIENT
Start: 2023-09-18 | End: 2023-09-18

## 2023-09-18 RX ORDER — SENNOSIDES A AND B 8.6 MG/1
1 TABLET, FILM COATED ORAL DAILY PRN
Status: DISCONTINUED | OUTPATIENT
Start: 2023-09-18 | End: 2023-09-19 | Stop reason: HOSPADM

## 2023-09-18 RX ORDER — SODIUM CHLORIDE 9 MG/ML
INJECTION, SOLUTION INTRAVENOUS PRN
Status: DISCONTINUED | OUTPATIENT
Start: 2023-09-18 | End: 2023-09-19 | Stop reason: HOSPADM

## 2023-09-18 RX ORDER — HYDRALAZINE HYDROCHLORIDE 10 MG/1
10 TABLET, FILM COATED ORAL ONCE
Status: COMPLETED | OUTPATIENT
Start: 2023-09-18 | End: 2023-09-18

## 2023-09-18 RX ORDER — LISINOPRIL 10 MG/1
40 TABLET ORAL DAILY
Status: DISCONTINUED | OUTPATIENT
Start: 2023-09-18 | End: 2023-09-18

## 2023-09-18 RX ORDER — PANTOPRAZOLE SODIUM 40 MG/1
40 TABLET, DELAYED RELEASE ORAL DAILY
Status: DISCONTINUED | OUTPATIENT
Start: 2023-09-18 | End: 2023-09-19 | Stop reason: HOSPADM

## 2023-09-18 RX ORDER — ATORVASTATIN CALCIUM 40 MG/1
40 TABLET, FILM COATED ORAL DAILY
Status: DISCONTINUED | OUTPATIENT
Start: 2023-09-18 | End: 2023-09-19 | Stop reason: HOSPADM

## 2023-09-18 RX ORDER — MAGNESIUM HYDROXIDE/ALUMINUM HYDROXICE/SIMETHICONE 120; 1200; 1200 MG/30ML; MG/30ML; MG/30ML
30 SUSPENSION ORAL EVERY 6 HOURS PRN
Status: DISCONTINUED | OUTPATIENT
Start: 2023-09-18 | End: 2023-09-19 | Stop reason: HOSPADM

## 2023-09-18 RX ORDER — ALENDRONATE SODIUM 70 MG/1
70 TABLET ORAL
COMMUNITY

## 2023-09-18 RX ADMIN — HYDRALAZINE HYDROCHLORIDE 10 MG: 20 INJECTION, SOLUTION INTRAMUSCULAR; INTRAVENOUS at 17:55

## 2023-09-18 RX ADMIN — HYDRALAZINE HYDROCHLORIDE 10 MG: 10 TABLET, FILM COATED ORAL at 01:26

## 2023-09-18 RX ADMIN — HYDRALAZINE HYDROCHLORIDE 20 MG: 20 INJECTION, SOLUTION INTRAMUSCULAR; INTRAVENOUS at 02:16

## 2023-09-18 RX ADMIN — MORPHINE SULFATE 2 MG: 2 INJECTION, SOLUTION INTRAMUSCULAR; INTRAVENOUS at 03:24

## 2023-09-18 RX ADMIN — SODIUM CHLORIDE 1000 ML: 9 INJECTION, SOLUTION INTRAVENOUS at 17:19

## 2023-09-18 RX ADMIN — IPRATROPIUM BROMIDE 0.5 MG: 0.5 SOLUTION RESPIRATORY (INHALATION) at 06:09

## 2023-09-18 RX ADMIN — AMLODIPINE BESYLATE 10 MG: 5 TABLET ORAL at 01:26

## 2023-09-18 RX ADMIN — SODIUM CHLORIDE 1000 ML: 9 INJECTION, SOLUTION INTRAVENOUS at 00:42

## 2023-09-18 RX ADMIN — WATER 1000 MG: 1 INJECTION INTRAMUSCULAR; INTRAVENOUS; SUBCUTANEOUS at 17:09

## 2023-09-18 RX ADMIN — MORPHINE SULFATE 2 MG: 2 INJECTION, SOLUTION INTRAMUSCULAR; INTRAVENOUS at 10:20

## 2023-09-18 RX ADMIN — ACETAMINOPHEN 650 MG: 325 TABLET ORAL at 17:54

## 2023-09-18 RX ADMIN — PANTOPRAZOLE SODIUM 40 MG: 40 TABLET, DELAYED RELEASE ORAL at 17:10

## 2023-09-18 RX ADMIN — SODIUM CHLORIDE, PRESERVATIVE FREE 10 ML: 5 INJECTION INTRAVENOUS at 21:19

## 2023-09-18 RX ADMIN — LISINOPRIL 40 MG: 10 TABLET ORAL at 01:27

## 2023-09-18 ASSESSMENT — PAIN SCALES - GENERAL
PAINLEVEL_OUTOF10: 8
PAINLEVEL_OUTOF10: 8
PAINLEVEL_OUTOF10: 0

## 2023-09-18 ASSESSMENT — PAIN DESCRIPTION - ORIENTATION
ORIENTATION: RIGHT;LEFT
ORIENTATION: RIGHT

## 2023-09-18 ASSESSMENT — PAIN DESCRIPTION - DESCRIPTORS
DESCRIPTORS: ACHING
DESCRIPTORS: ACHING;PRESSURE

## 2023-09-18 ASSESSMENT — PAIN DESCRIPTION - LOCATION
LOCATION: EAR
LOCATION: SHOULDER;CHEST

## 2023-09-18 NOTE — ED NOTES
Report called to [facility].  Patient placed in transport      [place], [place]  09/18/23 1900

## 2023-09-18 NOTE — ED NOTES
Request made to resident Patterson for anti-hypertensive medication.      Yash Bryson RN  09/18/23 2770

## 2023-09-18 NOTE — FLOWSHEET NOTE
Pt presents to the ED secondary altered mentation progressing over the past week. Per daughter, who is bedside, she has not seen or spoke to her mother for two weeks. During this time frame the daughter was notified that the Pt has been evaluated twice by [address]. There was confusion if the Pt had a CVA or if it the diagnostic testing showed a previous CVA that occurred. Daughter states that the Pt has a PMH of drug use. Pt currently is non verbal, does not follow commands and responds to painful stimuli. Pt shows no obvious signs of distress. Pt shows no obvious signs of trauma. Vital signs are stable.

## 2023-09-18 NOTE — H&P
Hospitalist History & Physical      PCP: Darya Born    Date of Admission: 9/17/2023    Date of Service: Pt seen/examined on 9/17/2023 and is admitted to Inpatient with expected LOS greater than two midnights due to medical therapy. Chief Complaint:  had concerns including Fatigue (Pt recently discharged from [facility] yesterday, per family pt was admitted for a stroke, increased fatigue today, per family [facility] called her today notifying them to go to hospital for concerned for bleeding on the brain, pt a/ox2). History Of Present Illness:    Ms. Eun Carroll, a 67y.o. year old female  who  has a past medical history of JOHN (acute kidney injury) ([address]), Asthma, Cerebral artery occlusion with cerebral infarction ([address]), COPD (chronic obstructive pulmonary disease) ([address]), Hypertension, and Seizures ([address]). Patient admitted for worsening mental status. As per her friend at bedside, she has been worsening for the last week. She was at [facility] but did not like the care. She was admitted for a possible CVA. However, she is unsure if she had a CVA or it was an old CVA. There was also mention about possible sepsis from her, but she was unsure. Patient admits to a headache. States it is about a 3/10. Goes across her forehead. No visual changes. Denies any numbness or tingling. No chest pain or shortness of breath. No nausea or vomiting. Admist to generalized weakness. Past Medical History:        Diagnosis Date    JOHN (acute kidney injury) ([address]) 11/26/2022    Asthma     Cerebral artery occlusion with cerebral infarction (HCC)     COPD (chronic obstructive pulmonary disease) (HCC)     Hypertension     Seizures ([address])        Past Surgical History:    No past surgical history on file. Medications Prior to Admission:      Prior to Admission medications    Medication Sig Start Date End Date Taking?  Authorizing Provider   lisinopril (PRINIVIL;ZESTRIL) 40 MG tablet Take 1 tablet by mouth daily 5/17/23   Pérez Pellet

## 2023-09-19 VITALS
OXYGEN SATURATION: 99 % | BODY MASS INDEX: 23.37 KG/M2 | WEIGHT: 127 LBS | RESPIRATION RATE: 18 BRPM | SYSTOLIC BLOOD PRESSURE: 156 MMHG | HEIGHT: 62 IN | HEART RATE: 90 BPM | TEMPERATURE: 97.8 F | DIASTOLIC BLOOD PRESSURE: 52 MMHG

## 2023-09-19 LAB
ALBUMIN SERPL-MCNC: 3.5 G/DL (ref 3.5–5.2)
ALP SERPL-CCNC: 33 U/L (ref 35–104)
ALT SERPL-CCNC: 7 U/L (ref 0–32)
ANION GAP SERPL CALCULATED.3IONS-SCNC: 13 MMOL/L (ref 7–16)
AST SERPL-CCNC: 13 U/L (ref 0–31)
BASOPHILS # BLD: 0.04 K/UL (ref 0–0.2)
BASOPHILS NFR BLD: 1 % (ref 0–2)
BILIRUB SERPL-MCNC: 0.2 MG/DL (ref 0–1.2)
BUN SERPL-MCNC: 18 MG/DL (ref 6–23)
CALCIUM SERPL-MCNC: 9.1 MG/DL (ref 8.6–10.2)
CHLORIDE SERPL-SCNC: 105 MMOL/L (ref 98–107)
CO2 SERPL-SCNC: 24 MMOL/L (ref 22–29)
CREAT SERPL-MCNC: 1.1 MG/DL (ref 0.5–1)
EOSINOPHIL # BLD: 0.19 K/UL (ref 0.05–0.5)
EOSINOPHILS RELATIVE PERCENT: 2 % (ref 0–6)
ERYTHROCYTE [DISTWIDTH] IN BLOOD BY AUTOMATED COUNT: 13.5 % (ref 11.5–15)
FOLATE SERPL-MCNC: 12.2 NG/ML (ref 4.8–24.2)
GFR SERPL CREATININE-BSD FRML MDRD: 54 ML/MIN/1.73M2
GLUCOSE SERPL-MCNC: 81 MG/DL (ref 74–99)
HCT VFR BLD AUTO: 35.3 % (ref 34–48)
HGB BLD-MCNC: 11.3 G/DL (ref 11.5–15.5)
IMM GRANULOCYTES # BLD AUTO: <0.03 K/UL (ref 0–0.58)
IMM GRANULOCYTES NFR BLD: 0 % (ref 0–5)
LYMPHOCYTES NFR BLD: 2.49 K/UL (ref 1.5–4)
LYMPHOCYTES RELATIVE PERCENT: 30 % (ref 20–42)
MCH RBC QN AUTO: 31.7 PG (ref 26–35)
MCHC RBC AUTO-ENTMCNC: 32 G/DL (ref 32–34.5)
MCV RBC AUTO: 99.2 FL (ref 80–99.9)
MONOCYTES NFR BLD: 0.6 K/UL (ref 0.1–0.95)
MONOCYTES NFR BLD: 7 % (ref 2–12)
NEUTROPHILS NFR BLD: 60 % (ref 43–80)
NEUTS SEG NFR BLD: 4.94 K/UL (ref 1.8–7.3)
PLATELET # BLD AUTO: 327 K/UL (ref 130–450)
PMV BLD AUTO: 9.5 FL (ref 7–12)
POTASSIUM SERPL-SCNC: 3.6 MMOL/L (ref 3.5–5)
PROT SERPL-MCNC: 6.6 G/DL (ref 6.4–8.3)
RBC # BLD AUTO: 3.56 M/UL (ref 3.5–5.5)
SODIUM SERPL-SCNC: 142 MMOL/L (ref 132–146)
VIT B12 SERPL-MCNC: 453 PG/ML (ref 211–946)
WBC OTHER # BLD: 8.3 K/UL (ref 4.5–11.5)

## 2023-09-19 PROCEDURE — 6370000000 HC RX 637 (ALT 250 FOR IP): Performed by: INTERNAL MEDICINE

## 2023-09-19 PROCEDURE — 85025 COMPLETE CBC W/AUTO DIFF WBC: CPT

## 2023-09-19 PROCEDURE — 2580000003 HC RX 258: Performed by: INTERNAL MEDICINE

## 2023-09-19 PROCEDURE — 80053 COMPREHEN METABOLIC PANEL: CPT

## 2023-09-19 PROCEDURE — 6360000002 HC RX W HCPCS: Performed by: INTERNAL MEDICINE

## 2023-09-19 PROCEDURE — 96376 TX/PRO/DX INJ SAME DRUG ADON: CPT

## 2023-09-19 PROCEDURE — 36415 COLL VENOUS BLD VENIPUNCTURE: CPT

## 2023-09-19 PROCEDURE — G0378 HOSPITAL OBSERVATION PER HR: HCPCS

## 2023-09-19 RX ADMIN — WATER 1000 MG: 1 INJECTION INTRAMUSCULAR; INTRAVENOUS; SUBCUTANEOUS at 08:27

## 2023-09-19 RX ADMIN — HYDRALAZINE HYDROCHLORIDE 10 MG: 20 INJECTION, SOLUTION INTRAMUSCULAR; INTRAVENOUS at 00:38

## 2023-09-19 RX ADMIN — SODIUM CHLORIDE, PRESERVATIVE FREE 10 ML: 5 INJECTION INTRAVENOUS at 08:28

## 2023-09-19 RX ADMIN — LISINOPRIL 40 MG: 20 TABLET ORAL at 08:29

## 2023-09-19 RX ADMIN — BUSPIRONE HYDROCHLORIDE 5 MG: 5 TABLET ORAL at 08:27

## 2023-09-19 RX ADMIN — ACETAMINOPHEN 650 MG: 325 TABLET ORAL at 08:27

## 2023-09-19 RX ADMIN — PANTOPRAZOLE SODIUM 40 MG: 40 TABLET, DELAYED RELEASE ORAL at 08:27

## 2023-09-19 RX ADMIN — HYDRALAZINE HYDROCHLORIDE 10 MG: 10 TABLET, FILM COATED ORAL at 08:27

## 2023-09-19 RX ADMIN — ATORVASTATIN CALCIUM 40 MG: 40 TABLET, FILM COATED ORAL at 08:28

## 2023-09-19 ASSESSMENT — PAIN DESCRIPTION - ONSET: ONSET: GRADUAL

## 2023-09-19 ASSESSMENT — PAIN DESCRIPTION - FREQUENCY: FREQUENCY: INTERMITTENT

## 2023-09-19 ASSESSMENT — PAIN SCALES - GENERAL
PAINLEVEL_OUTOF10: 7
PAINLEVEL_OUTOF10: 8

## 2023-09-19 ASSESSMENT — PAIN DESCRIPTION - DESCRIPTORS: DESCRIPTORS: ACHING;DISCOMFORT;THROBBING

## 2023-09-19 ASSESSMENT — PAIN - FUNCTIONAL ASSESSMENT
PAIN_FUNCTIONAL_ASSESSMENT: ACTIVITIES ARE NOT PREVENTED
PAIN_FUNCTIONAL_ASSESSMENT: ACTIVITIES ARE NOT PREVENTED

## 2023-09-19 ASSESSMENT — PAIN DESCRIPTION - ORIENTATION
ORIENTATION: MID
ORIENTATION: RIGHT;LEFT

## 2023-09-19 ASSESSMENT — PAIN DESCRIPTION - LOCATION
LOCATION: HEAD
LOCATION: HEAD

## 2023-09-19 ASSESSMENT — PAIN DESCRIPTION - PAIN TYPE: TYPE: CHRONIC PAIN

## 2023-09-19 NOTE — CONSULTS
[facility]  Neurology Consult    Date:  9/19/2023  Patient Name:  Harry Gregorio  YOB: 1950  MRN: [id number]     PCP:  Kenrick Santizo   Referring:  No ref. provider found      Chief Complaint: AMS, intractable headache    History obtained from: Patient    Kenrick Kwan is a 67 y.o. female who presents for complaint of altered mental status and intractable headache. With consideration of patient's positive drug screening and only morphine being given while patient was admitted, the patient's lethargy and altered mentation may be from substance abuse. However, in the setting of ear pain, headache and mouth sores suggestive of candidiasis evaluation for infection such as syphilis and HIV may be warranted. As she has no lateralizing deficits, CVA is unlikely in this patient. Plan  Obtain RPR and HIV screen  Repeat UDS and SDS if suspicion of continued substance use        History of Present Illness:  Harry Gregorio is a 67 y.o. right handed female presenting for evaluation of AMS and headache. She states she was just at [facility] with a concern of stroke. She states she felt numb on her right side with difficulty with speech at that time, but left that admission because she did not want to stay. She states [facility] called her to let her know there were some abnormalities on her head imaging which was suggestive of old strokes, but no new acute findings. All of her speech and numbness has resolved since leaving [facility] and does not have any of these symptoms present today. She states she continues to have a headache as well as ear fullness and pain. She has been having worsening pain in her mouth, and on examination there are white plaques and sores across her gums. She adamantly denies drug use for at least the last 7 months. She smokes half a pack of cigarettes a day and does not drink alcohol. She states she wishes to go home soon.      She otherwise denies fever, chills, normal.     Cranial Nerves  II. Visual fields full to confrontation bilaterally. III, IV, VI: Pupils equally round and reactive to light, 3 to 2 mm bilaterally. EOMs: full, no nystagmus. V. Facial sensation intact to light touch bilaterally  VII: Facial movements symmetric and strong  VIII: Hearing intact to voice  IX,X: Palate elevates symmetrically. No dysarthria  XI: Sternocleidomastoid and trapezius 5/5 bilaterally   XII: Tongue is midline    Motor     Right Left   Right Left   Deltoid 5 5  Hip Flexion 5 5   Biceps 5 5  Knee Extension 5 5   Triceps 5 5  Knee Flexion 5 5   Handgrip 5 5  Ankle Dorsiflexion 5 5       Ankle Plantarflexion 5 5     Tone: Normal in all four limbs    Bulk: Normal in all four limbs with no evidence of atrophy    Pronator drift: absent bilaterally    Sensation  Light Touch: Intact distally in all four limbs  Pinprick: Intact distally in all four limbs  Vibration: Intact distally in all four limbs  Proprioception: Intact distally in all four limbs    Reflexes     Right Left   Biceps 2 2   Brachioradialis 2 2   Triceps 2 2   Patellar 2 2   Achilles 2 2   ankle clonus none none   Babinski absent absent     Coordination  Rapid alternating movements normal in bilateral upper extremities  Finger to nose testing normal bilaterally  Heel to shin testing normal bilaterally    Gait  Normal base, stride, and arm swing with casual gait. 2-3 steps to turn. Normal heel, toe and tandem gait.    Romberg test negative        Labs  Recent Labs     09/17/23  1850 09/18/23  0424 09/18/23  1009 09/18/23  1848 09/19/23  0427   NA  --    < >  --    < > 142   K  --    < >  --    < > 3.6   CL  --    < >  --    < > 105   CO2  --    < >  --    < > 24   BUN  --    < >  --    < > 18   CREATININE  --    < >  --    < > 1.1*   GLUCOSE  --    < >  --    < > 81   CALCIUM  --    < >  --    < > 9.1   PROT  --   --   --   --  6.6   LABALBU  --   --   --   --  3.5   BILITOT  --   --   --   --  0.2   ALKPHOS  --   --

## 2023-09-19 NOTE — PROGRESS NOTES
Pt leaving AMA. AMA papers signed and placed into soft chart. Pt IV and heart monitor removed. Dr. Gorman Felt notified.

## 2023-09-19 NOTE — PROGRESS NOTES
Pt refusing MRI and care. Dr. Clovis Ospina and associates entered room when this RN was discussing MRI with pt. She stated to Dr. Clovis Ospina that she wants to be discharged. Dr. Bridget Schmidt notified.

## 2023-09-19 NOTE — PROGRESS NOTES
4 Eyes Skin Assessment     NAME:  Michael Gamez  YOB: 1950  MEDICAL RECORD NUMBER:  [id number]    The patient is being assessed for  Admission    I agree that at least one RN has performed a thorough Head to Toe Skin Assessment on the patient. ALL assessment sites listed below have been assessed. Areas assessed by both nurses:    Head, Face, Ears, Shoulders, Back, Chest, Arms, Elbows, Hands, Sacrum. Buttock, Coccyx, Ischium, and Legs. Feet and Heels        Does the Patient have a Wound?  No noted wound(s)       Lewis Prevention initiated by RN: Yes  Wound Care Orders initiated by RN: No    Pressure Injury (Stage 3,4, Unstageable, DTI, NWPT, and Complex wounds) if present, place Wound referral order by RN under : No    New Ostomies, if present place, Ostomy referral order under : No     Nurse 1 eSignature: Electronically signed by Dameon Trejo RN on 9/18/23 at 11:08 PM EDT    **SHARE this note so that the co-signing nurse can place an eSignature**    Nurse 2 eSignature: Electronically signed by Gerardo Mancia RN on 9/19/23 at 1:19 AM EDT

## 2023-09-23 LAB
MICROORGANISM SPEC CULT: NORMAL
MICROORGANISM SPEC CULT: NORMAL
SERVICE CMNT-IMP: NORMAL
SERVICE CMNT-IMP: NORMAL
SPECIMEN DESCRIPTION: NORMAL
SPECIMEN DESCRIPTION: NORMAL

## 2023-09-25 NOTE — PROGRESS NOTES
Physician Progress Note      PATIENTCoandres Mcmahon  CSN #:                  [id number]  :                       1950  ADMIT DATE:       2023 6:10 PM  [facility] DATE:        2023 11:28 AM  RESPONDING  PROVIDER #:        Kirsten Mari MD          QUERY TEXT:    Pt admitted with fatigue and AMS. Noted documentation of possible sepsis in   ED provider note, H&P, and Discharge summary on. If possible, please document   in progress notes and discharge summary:    The medical record reflects the following:  Risk Factors: Recent admission to [facility] for CVA  Clinical Indicators: \"Possible\" UTI, Labs WBC 13 Lactic acide, sepsis 2.1 Bld   cx no growth  Treatment: Serial labs, Rocephin    Thank you,  Geovanna WOODALL, RN, CRCR  Clinical Documentation Improvement  [email or web address]  Options provided:  -- Sepsis not clinically significant  -- Sepsis is clinically significant  -- Other - I will add my own diagnosis  -- Disagree - Not applicable / Not valid  -- Disagree - Clinically unable to determine / Unknown  -- Refer to Clinical Documentation Reviewer    PROVIDER RESPONSE TEXT:    Sepsis is not clinically significant.     Query created by: Araceli Aragon on 2023 11:50 AM      Electronically signed by:  Kirsten Mari MD 2023 11:53 AM

## 2023-09-25 NOTE — PROGRESS NOTES
Physician Progress Note      Cathy Paris  CSN #:                  [id number]  :                       1950  ADMIT DATE:       2023 6:10 PM  [facility] DATE:        2023 11:28 AM  RESPONDING  PROVIDER #:        Catie Walsh MD          QUERY TEXT:    Pt admitted with AMS. Noted documentation of UTI in Ed provider note and H&P. If possible, please document in progress notes and discharge summary:    The medical record reflects the following:  Risk Factors: Advanced age, recent CVA with fatigue and confusion, AMS  Clinical Indicators: U/A: Trace bacteria  Trace leukocyte esterase  Trace   protein  Urobilinogen  Treatment: Rocephin, Pt left AMA    Thank you,  Gia TURKN, RN, CRCR  Clinical Documentation Improvement  [email or web address]  Options provided:  -- UTI not clinically significant  -- UTI is clinically significant  -- Other - I will add my own diagnosis  -- Disagree - Not applicable / Not valid  -- Disagree - Clinically unable to determine / Unknown  -- Refer to Clinical Documentation Reviewer    PROVIDER RESPONSE TEXT:    UTI is not clinically significant.     Query created by: Ger Caicedo on 2023 11:54 AM      Electronically signed by:  Catie Walsh MD 2023 11:58 AM

## 2024-11-09 NOTE — ED NOTES
Problem: Adult Inpatient Plan of Care  Goal: Plan of Care Review  Outcome: Progressing   VN note:   Patient's chart, labs and vital signs reviewed, will be available to intervene if needed.      Family at bedside visiting with mehreen Adams RN  09/29/21 1931